# Patient Record
Sex: MALE | Race: WHITE | Employment: OTHER | URBAN - NONMETROPOLITAN AREA
[De-identification: names, ages, dates, MRNs, and addresses within clinical notes are randomized per-mention and may not be internally consistent; named-entity substitution may affect disease eponyms.]

---

## 2017-02-09 ENCOUNTER — OFFICE VISIT (OUTPATIENT)
Dept: INTERNAL MEDICINE CLINIC | Age: 72
End: 2017-02-09

## 2017-02-09 VITALS
WEIGHT: 164.2 LBS | RESPIRATION RATE: 16 BRPM | BODY MASS INDEX: 24.25 KG/M2 | HEART RATE: 72 BPM | DIASTOLIC BLOOD PRESSURE: 82 MMHG | TEMPERATURE: 97.7 F | SYSTOLIC BLOOD PRESSURE: 158 MMHG | OXYGEN SATURATION: 98 %

## 2017-02-09 DIAGNOSIS — E78.00 PURE HYPERCHOLESTEROLEMIA: ICD-10-CM

## 2017-02-09 DIAGNOSIS — I10 ESSENTIAL HYPERTENSION: Primary | ICD-10-CM

## 2017-02-09 DIAGNOSIS — Z12.5 SCREENING FOR PROSTATE CANCER: ICD-10-CM

## 2017-02-09 DIAGNOSIS — E03.9 ACQUIRED HYPOTHYROIDISM: ICD-10-CM

## 2017-02-09 PROCEDURE — 3288F FALL RISK ASSESSMENT DOCD: CPT | Performed by: INTERNAL MEDICINE

## 2017-02-09 PROCEDURE — G8510 SCR DEP NEG, NO PLAN REQD: HCPCS | Performed by: INTERNAL MEDICINE

## 2017-02-09 PROCEDURE — 99213 OFFICE O/P EST LOW 20 MIN: CPT | Performed by: INTERNAL MEDICINE

## 2017-02-09 RX ORDER — LEVOTHYROXINE SODIUM 0.05 MG/1
50 TABLET ORAL DAILY
Qty: 90 TABLET | Refills: 1 | Status: SHIPPED | OUTPATIENT
Start: 2017-02-09 | End: 2017-08-09 | Stop reason: SDUPTHER

## 2017-02-09 RX ORDER — AMLODIPINE BESYLATE 5 MG/1
5 TABLET ORAL DAILY
Qty: 30 TABLET | Refills: 3 | Status: SHIPPED | OUTPATIENT
Start: 2017-02-09 | End: 2017-05-25 | Stop reason: SDUPTHER

## 2017-02-09 ASSESSMENT — PATIENT HEALTH QUESTIONNAIRE - PHQ9
SUM OF ALL RESPONSES TO PHQ QUESTIONS 1-9: 0
2. FEELING DOWN, DEPRESSED OR HOPELESS: 0
1. LITTLE INTEREST OR PLEASURE IN DOING THINGS: 0
SUM OF ALL RESPONSES TO PHQ9 QUESTIONS 1 & 2: 0

## 2017-03-15 ENCOUNTER — OFFICE VISIT (OUTPATIENT)
Dept: INTERNAL MEDICINE CLINIC | Age: 72
End: 2017-03-15

## 2017-03-15 VITALS
HEIGHT: 69 IN | OXYGEN SATURATION: 98 % | RESPIRATION RATE: 16 BRPM | SYSTOLIC BLOOD PRESSURE: 148 MMHG | HEART RATE: 72 BPM | WEIGHT: 161.4 LBS | BODY MASS INDEX: 23.91 KG/M2 | DIASTOLIC BLOOD PRESSURE: 80 MMHG | TEMPERATURE: 97.9 F

## 2017-03-15 DIAGNOSIS — J20.9 ACUTE BRONCHITIS, UNSPECIFIED ORGANISM: Primary | ICD-10-CM

## 2017-03-15 PROCEDURE — 99213 OFFICE O/P EST LOW 20 MIN: CPT | Performed by: INTERNAL MEDICINE

## 2017-03-15 RX ORDER — AZITHROMYCIN 250 MG/1
TABLET, FILM COATED ORAL
Qty: 1 PACKET | Refills: 0 | Status: SHIPPED | OUTPATIENT
Start: 2017-03-15 | End: 2017-05-09 | Stop reason: ALTCHOICE

## 2017-03-30 ENCOUNTER — TELEPHONE (OUTPATIENT)
Dept: INTERNAL MEDICINE CLINIC | Age: 72
End: 2017-03-30

## 2017-04-28 ENCOUNTER — TELEPHONE (OUTPATIENT)
Dept: INTERNAL MEDICINE CLINIC | Age: 72
End: 2017-04-28

## 2017-05-04 ENCOUNTER — HOSPITAL ENCOUNTER (OUTPATIENT)
Dept: LAB | Age: 72
Discharge: HOME | End: 2017-05-04
Admitting: INTERNAL MEDICINE

## 2017-05-04 LAB
ALBUMIN SERPL-MCNC: 4.4 GM/DL (ref 3.4–5)
ALP BLD-CCNC: 96 IU/L (ref 40–129)
ALT SERPL-CCNC: 30 U/L (ref 10–40)
ANION GAP SERPL CALCULATED.3IONS-SCNC: 8 MMOL/L (ref 4–16)
AST SERPL-CCNC: 34 IU/L (ref 15–37)
BILIRUB SERPL-MCNC: 0.4 MG/DL (ref 0–1)
BUN BLDV-MCNC: 17 MG/DL (ref 6–23)
CALCIUM SERPL-MCNC: 9.7 MG/DL (ref 8.3–10.6)
CHLORIDE BLD-SCNC: 103 MMOL/L (ref 99–110)
CHOLESTEROL, FASTING: 142 MG/DL
CO2: 29 MMOL/L (ref 21–32)
CREAT SERPL-MCNC: 1 MG/DL (ref 0.9–1.3)
GFR AFRICAN AMERICAN: >60 ML/MIN/1.73M2
GFR NON-AFRICAN AMERICAN: >60 ML/MIN/1.73M2
GLUCOSE FASTING: 101 MG/DL (ref 70–99)
HDLC SERPL-MCNC: 70 MG/DL
LDL CHOLESTEROL DIRECT: 70 MG/DL
POTASSIUM SERPL-SCNC: 4.2 MMOL/L (ref 3.5–5.1)
PROSTATE SPECIFIC ANTIGEN: 2.44 NG/ML (ref 0–4)
SODIUM BLD-SCNC: 140 MMOL/L (ref 135–145)
TOTAL PROTEIN: 7.3 GM/DL (ref 6.4–8.2)
TRIGLYCERIDE, FASTING: 98 MG/DL
TSH HIGH SENSITIVITY: 3.13 UIU/ML (ref 0.27–4.2)

## 2017-05-09 ENCOUNTER — OFFICE VISIT (OUTPATIENT)
Dept: INTERNAL MEDICINE CLINIC | Age: 72
End: 2017-05-09

## 2017-05-09 VITALS
WEIGHT: 162.8 LBS | BODY MASS INDEX: 24.11 KG/M2 | HEART RATE: 68 BPM | RESPIRATION RATE: 13 BRPM | SYSTOLIC BLOOD PRESSURE: 162 MMHG | TEMPERATURE: 98 F | DIASTOLIC BLOOD PRESSURE: 82 MMHG | HEIGHT: 69 IN | OXYGEN SATURATION: 98 %

## 2017-05-09 DIAGNOSIS — E78.00 PURE HYPERCHOLESTEROLEMIA: Primary | ICD-10-CM

## 2017-05-09 DIAGNOSIS — E03.9 ACQUIRED HYPOTHYROIDISM: ICD-10-CM

## 2017-05-09 DIAGNOSIS — I10 ESSENTIAL HYPERTENSION: ICD-10-CM

## 2017-05-09 PROCEDURE — 99213 OFFICE O/P EST LOW 20 MIN: CPT | Performed by: INTERNAL MEDICINE

## 2017-05-25 RX ORDER — AMLODIPINE BESYLATE 5 MG/1
5 TABLET ORAL DAILY
Qty: 30 TABLET | Refills: 5 | Status: SHIPPED | OUTPATIENT
Start: 2017-05-25 | End: 2017-11-09 | Stop reason: SDUPTHER

## 2017-06-19 RX ORDER — ATORVASTATIN CALCIUM 40 MG/1
TABLET, FILM COATED ORAL
Qty: 90 TABLET | Refills: 1 | Status: SHIPPED | OUTPATIENT
Start: 2017-06-19 | End: 2017-11-09 | Stop reason: SDUPTHER

## 2017-08-09 ENCOUNTER — OFFICE VISIT (OUTPATIENT)
Dept: INTERNAL MEDICINE CLINIC | Age: 72
End: 2017-08-09

## 2017-08-09 VITALS
OXYGEN SATURATION: 98 % | BODY MASS INDEX: 24.25 KG/M2 | RESPIRATION RATE: 16 BRPM | TEMPERATURE: 98.2 F | SYSTOLIC BLOOD PRESSURE: 128 MMHG | HEART RATE: 76 BPM | DIASTOLIC BLOOD PRESSURE: 72 MMHG | WEIGHT: 164.2 LBS

## 2017-08-09 DIAGNOSIS — E03.9 ACQUIRED HYPOTHYROIDISM: ICD-10-CM

## 2017-08-09 DIAGNOSIS — I10 ESSENTIAL HYPERTENSION: Primary | ICD-10-CM

## 2017-08-09 DIAGNOSIS — E78.00 PURE HYPERCHOLESTEROLEMIA: ICD-10-CM

## 2017-08-09 PROCEDURE — 99213 OFFICE O/P EST LOW 20 MIN: CPT | Performed by: INTERNAL MEDICINE

## 2017-08-09 RX ORDER — LEVOTHYROXINE SODIUM 0.05 MG/1
50 TABLET ORAL DAILY
Qty: 90 TABLET | Refills: 1 | Status: SHIPPED | OUTPATIENT
Start: 2017-08-09 | End: 2018-02-08 | Stop reason: SDUPTHER

## 2017-08-09 ASSESSMENT — ENCOUNTER SYMPTOMS
EYES NEGATIVE: 1
SHORTNESS OF BREATH: 0
NAUSEA: 0
ORTHOPNEA: 0
GASTROINTESTINAL NEGATIVE: 1
HEARTBURN: 0
RESPIRATORY NEGATIVE: 1
VOMITING: 0
COUGH: 0

## 2017-11-09 ENCOUNTER — OFFICE VISIT (OUTPATIENT)
Dept: INTERNAL MEDICINE CLINIC | Age: 72
End: 2017-11-09

## 2017-11-09 VITALS
BODY MASS INDEX: 24.78 KG/M2 | RESPIRATION RATE: 20 BRPM | SYSTOLIC BLOOD PRESSURE: 132 MMHG | DIASTOLIC BLOOD PRESSURE: 72 MMHG | HEART RATE: 72 BPM | TEMPERATURE: 98 F | OXYGEN SATURATION: 98 % | WEIGHT: 167.8 LBS

## 2017-11-09 DIAGNOSIS — N40.1 BENIGN PROSTATIC HYPERPLASIA WITH URINARY FREQUENCY: ICD-10-CM

## 2017-11-09 DIAGNOSIS — Z12.5 SCREENING FOR PROSTATE CANCER: ICD-10-CM

## 2017-11-09 DIAGNOSIS — I10 ESSENTIAL HYPERTENSION: Primary | ICD-10-CM

## 2017-11-09 DIAGNOSIS — E78.00 PURE HYPERCHOLESTEROLEMIA: ICD-10-CM

## 2017-11-09 DIAGNOSIS — E03.9 ACQUIRED HYPOTHYROIDISM: ICD-10-CM

## 2017-11-09 DIAGNOSIS — Z23 NEED FOR PROPHYLACTIC VACCINATION AGAINST STREPTOCOCCUS PNEUMONIAE (PNEUMOCOCCUS): ICD-10-CM

## 2017-11-09 DIAGNOSIS — R35.0 BENIGN PROSTATIC HYPERPLASIA WITH URINARY FREQUENCY: ICD-10-CM

## 2017-11-09 PROCEDURE — G0009 ADMIN PNEUMOCOCCAL VACCINE: HCPCS | Performed by: INTERNAL MEDICINE

## 2017-11-09 PROCEDURE — 99213 OFFICE O/P EST LOW 20 MIN: CPT | Performed by: INTERNAL MEDICINE

## 2017-11-09 PROCEDURE — 90732 PPSV23 VACC 2 YRS+ SUBQ/IM: CPT | Performed by: INTERNAL MEDICINE

## 2017-11-09 RX ORDER — AMLODIPINE BESYLATE 5 MG/1
5 TABLET ORAL DAILY
Qty: 90 TABLET | Refills: 1 | Status: SHIPPED | OUTPATIENT
Start: 2017-11-09 | End: 2018-02-08 | Stop reason: SDUPTHER

## 2017-11-09 RX ORDER — ATORVASTATIN CALCIUM 40 MG/1
TABLET, FILM COATED ORAL
Qty: 90 TABLET | Refills: 1 | Status: SHIPPED | OUTPATIENT
Start: 2017-11-09 | End: 2018-02-08 | Stop reason: SDUPTHER

## 2017-11-09 RX ORDER — INFLUENZA A VIRUS A/MICHIGAN/45/2015 X-275 (H1N1) ANTIGEN (FORMALDEHYDE INACTIVATED), INFLUENZA A VIRUS A/SINGAPORE/INFIMH-16-0019/2016 IVR-186 (H3N2) ANTIGEN (FORMALDEHYDE INACTIVATED), AND INFLUENZA B VIRUS B/MARYLAND/15/2016 BX-69A (A B/COLORADO/6/2017-LIKE VIRUS) ANTIGEN (FORMALDEHYDE INACTIVATED) 60; 60; 60 UG/.5ML; UG/.5ML; UG/.5ML
INJECTION, SUSPENSION INTRAMUSCULAR
COMMUNITY
Start: 2017-09-23 | End: 2018-02-08 | Stop reason: ALTCHOICE

## 2017-11-09 ASSESSMENT — ENCOUNTER SYMPTOMS
EYES NEGATIVE: 1
GASTROINTESTINAL NEGATIVE: 1
RESPIRATORY NEGATIVE: 1

## 2017-11-09 NOTE — PROGRESS NOTES
Jimenez Allen  Patient's  is 1945  Seen in office on 2017      SUBJECTIVE:  Rosalina Aguero is a 71 y. o.year old male presents today   Chief Complaint   Patient presents with    Hypertension     3 month follow up    Hyperlipidemia    Medication Refill     patient requestion written prescriptions please. Pt is here for HTN HLD   Pt is doing well. Has no complaints  Patient has hypertension. Taking medications No headaches, no chest pain, no palpitations and no dizziness. Patient has hyperlipidemia. Taking medications. No abdominal pain, no nausea or vomiting. No myalgias. Has mild fatigue all the time  Taking medications regularly. No side effects noted. Review of Systems   Constitutional: Positive for malaise/fatigue. Eyes: Negative. Respiratory: Negative. Cardiovascular: Negative. Negative for chest pain and palpitations. Gastrointestinal: Negative. Genitourinary: Positive for frequency. Musculoskeletal: Negative. Skin: Negative. Neurological: Negative. Endo/Heme/Allergies: Negative. Psychiatric/Behavioral: Negative. OBJECTIVE: /72   Pulse 72   Temp 98 °F (36.7 °C) (Oral)   Resp 20   Wt 167 lb 12.8 oz (76.1 kg)   SpO2 98%   BMI 24.78 kg/m²     Wt Readings from Last 3 Encounters:   17 167 lb 12.8 oz (76.1 kg)   17 164 lb 3.2 oz (74.5 kg)   17 162 lb 12.8 oz (73.8 kg)      GENERAL:  Alert, oriented, pleasant, in no apparent distress. HEENT:  Conjunctiva pink, no scleral icterus. ENT clear. NECK:  Supple. No jugular venous distention noted. No masses felt,  CARDIOVASCULAR:  Normal S1 and S2    PULMONARY:  No respiratory distress. No wheezes or rales. ABDOMEN:  Soft and non-tender,no masses  or organomegaly. EXTREMITIES:  No cyanosis, clubbing, or significant edema. SKIN: Skin is warm and dry. NEUROLOGICAL:  Cranial nerves II through XII are grossly intact. IMPRESSION:    Encounter Diagnoses   Name Primary? Medical History:   Diagnosis Date    Benign prostatic hyperplasia with urinary frequency 11/9/2017    Patient is taking Saw Palmetto combinations    H/O colonoscopy 3/2012    Dr. Clementine Vicente (hyperlipidemia)     Hypothyroidism      Past Surgical History:   Procedure Laterality Date    APPENDECTOMY  age 24    COLONOSCOPY  03/28/2012     DR. Chan     Social History   Substance Use Topics    Smoking status: Former Smoker     Quit date: 11/24/1988    Smokeless tobacco: Never Used    Alcohol use No       LAB REVIEW:  CBC: No results found for: WBC, HGB, HCT, PLT  Lipids:   Lab Results   Component Value Date    CHOL 160 10/29/2016    TRIG 102 10/29/2016    HDL 70 05/04/2017    LDLDIRECT 70 05/04/2017    TRIGLYCFAST 98 05/04/2017    CHOLESTFAST 142 05/04/2017     Renal:   Lab Results   Component Value Date    BUN 17 05/04/2017    CREATININE 1.0 05/04/2017     05/04/2017    K 4.2 05/04/2017    ALT 30 05/04/2017    AST 34 05/04/2017     PT/INR: No results found for: INR  A1C: No results found for: Hiral Phan MD, 11/9/2017 , 3:40 PM

## 2018-02-03 ENCOUNTER — HOSPITAL ENCOUNTER (OUTPATIENT)
Dept: LAB | Age: 73
Discharge: HOME | End: 2018-02-03
Attending: INTERNAL MEDICINE | Admitting: INTERNAL MEDICINE

## 2018-02-03 LAB
ALBUMIN SERPL-MCNC: 4.5 GM/DL (ref 3.4–5)
ALP BLD-CCNC: 100 IU/L (ref 40–129)
ALT SERPL-CCNC: 24 U/L (ref 10–40)
ANION GAP SERPL CALCULATED.3IONS-SCNC: 12 MMOL/L (ref 4–16)
AST SERPL-CCNC: 26 IU/L (ref 15–37)
BILIRUB SERPL-MCNC: 0.6 MG/DL (ref 0–1)
BUN BLDV-MCNC: 17 MG/DL (ref 6–23)
CALCIUM SERPL-MCNC: 9.7 MG/DL (ref 8.3–10.6)
CHLORIDE BLD-SCNC: 103 MMOL/L (ref 99–110)
CHOLESTEROL, FASTING: 137 MG/DL
CO2: 29 MMOL/L (ref 21–32)
CREAT SERPL-MCNC: 1.2 MG/DL (ref 0.9–1.3)
GFR AFRICAN AMERICAN: >60 ML/MIN/1.73M2
GFR NON-AFRICAN AMERICAN: 60 ML/MIN/1.73M2
GLUCOSE FASTING: 93 MG/DL (ref 70–99)
HDLC SERPL-MCNC: 67 MG/DL
LDL CHOLESTEROL DIRECT: 64 MG/DL
POTASSIUM SERPL-SCNC: 4.7 MMOL/L (ref 3.5–5.1)
PROSTATE SPECIFIC ANTIGEN: 2.29 NG/ML (ref 0–4)
SODIUM BLD-SCNC: 144 MMOL/L (ref 135–145)
TOTAL PROTEIN: 7.4 GM/DL (ref 6.4–8.2)
TRIGLYCERIDE, FASTING: 95 MG/DL

## 2018-02-08 ENCOUNTER — OFFICE VISIT (OUTPATIENT)
Dept: INTERNAL MEDICINE CLINIC | Age: 73
End: 2018-02-08

## 2018-02-08 VITALS
RESPIRATION RATE: 16 BRPM | SYSTOLIC BLOOD PRESSURE: 138 MMHG | HEIGHT: 69 IN | DIASTOLIC BLOOD PRESSURE: 82 MMHG | HEART RATE: 86 BPM | WEIGHT: 163.6 LBS | OXYGEN SATURATION: 98 % | TEMPERATURE: 97.8 F | BODY MASS INDEX: 24.23 KG/M2

## 2018-02-08 DIAGNOSIS — I10 ESSENTIAL HYPERTENSION: Primary | ICD-10-CM

## 2018-02-08 DIAGNOSIS — Z72.89 OTHER PROBLEMS RELATED TO LIFESTYLE: ICD-10-CM

## 2018-02-08 DIAGNOSIS — N40.1 BENIGN PROSTATIC HYPERPLASIA WITH URINARY FREQUENCY: ICD-10-CM

## 2018-02-08 DIAGNOSIS — R35.0 BENIGN PROSTATIC HYPERPLASIA WITH URINARY FREQUENCY: ICD-10-CM

## 2018-02-08 DIAGNOSIS — E03.9 ACQUIRED HYPOTHYROIDISM: ICD-10-CM

## 2018-02-08 DIAGNOSIS — E78.00 PURE HYPERCHOLESTEROLEMIA: ICD-10-CM

## 2018-02-08 PROCEDURE — 99213 OFFICE O/P EST LOW 20 MIN: CPT | Performed by: INTERNAL MEDICINE

## 2018-02-08 RX ORDER — AMLODIPINE BESYLATE 5 MG/1
5 TABLET ORAL DAILY
Qty: 90 TABLET | Refills: 1 | Status: SHIPPED | OUTPATIENT
Start: 2018-02-08 | End: 2018-08-06 | Stop reason: SDUPTHER

## 2018-02-08 RX ORDER — LEVOTHYROXINE SODIUM 0.05 MG/1
50 TABLET ORAL DAILY
Qty: 90 TABLET | Refills: 1 | Status: SHIPPED | OUTPATIENT
Start: 2018-02-08 | End: 2018-08-06 | Stop reason: SDUPTHER

## 2018-02-08 RX ORDER — ATORVASTATIN CALCIUM 40 MG/1
TABLET, FILM COATED ORAL
Qty: 90 TABLET | Refills: 1 | Status: SHIPPED | OUTPATIENT
Start: 2018-02-08 | End: 2018-08-06 | Stop reason: SDUPTHER

## 2018-02-08 ASSESSMENT — ENCOUNTER SYMPTOMS
RESPIRATORY NEGATIVE: 1
GASTROINTESTINAL NEGATIVE: 1
EYES NEGATIVE: 1

## 2018-04-10 ENCOUNTER — OFFICE VISIT (OUTPATIENT)
Dept: INTERNAL MEDICINE CLINIC | Age: 73
End: 2018-04-10

## 2018-04-10 VITALS
BODY MASS INDEX: 24.43 KG/M2 | HEART RATE: 72 BPM | RESPIRATION RATE: 16 BRPM | SYSTOLIC BLOOD PRESSURE: 138 MMHG | WEIGHT: 165.4 LBS | TEMPERATURE: 98 F | DIASTOLIC BLOOD PRESSURE: 80 MMHG | OXYGEN SATURATION: 98 %

## 2018-04-10 DIAGNOSIS — I10 ESSENTIAL HYPERTENSION: ICD-10-CM

## 2018-04-10 DIAGNOSIS — N40.1 BENIGN PROSTATIC HYPERPLASIA WITH URINARY FREQUENCY: ICD-10-CM

## 2018-04-10 DIAGNOSIS — E03.9 ACQUIRED HYPOTHYROIDISM: ICD-10-CM

## 2018-04-10 DIAGNOSIS — R35.0 BENIGN PROSTATIC HYPERPLASIA WITH URINARY FREQUENCY: ICD-10-CM

## 2018-04-10 DIAGNOSIS — Z00.00 ROUTINE GENERAL MEDICAL EXAMINATION AT A HEALTH CARE FACILITY: Primary | ICD-10-CM

## 2018-04-10 DIAGNOSIS — E78.00 PURE HYPERCHOLESTEROLEMIA: ICD-10-CM

## 2018-04-10 PROCEDURE — G0439 PPPS, SUBSEQ VISIT: HCPCS | Performed by: INTERNAL MEDICINE

## 2018-04-10 ASSESSMENT — LIFESTYLE VARIABLES: HOW OFTEN DO YOU HAVE A DRINK CONTAINING ALCOHOL: 0

## 2018-04-10 ASSESSMENT — ANXIETY QUESTIONNAIRES: GAD7 TOTAL SCORE: 1

## 2018-04-10 ASSESSMENT — PATIENT HEALTH QUESTIONNAIRE - PHQ9: SUM OF ALL RESPONSES TO PHQ QUESTIONS 1-9: 0

## 2018-04-12 DIAGNOSIS — Z13.6 ENCOUNTER FOR ABDOMINAL AORTIC ANEURYSM (AAA) SCREENING: Primary | ICD-10-CM

## 2018-04-23 ENCOUNTER — HOSPITAL ENCOUNTER (OUTPATIENT)
Dept: ULTRASOUND IMAGING | Age: 73
Discharge: HOME | End: 2018-04-23
Attending: INTERNAL MEDICINE | Admitting: INTERNAL MEDICINE

## 2018-04-23 DIAGNOSIS — Z13.6 ENCOUNTER FOR SCREENING FOR CARDIOVASCULAR DISORDERS: ICD-10-CM

## 2018-04-23 DIAGNOSIS — Z13.6 SCREENING FOR AAA (ABDOMINAL AORTIC ANEURYSM): ICD-10-CM

## 2018-04-26 ENCOUNTER — TELEPHONE (OUTPATIENT)
Dept: INTERNAL MEDICINE CLINIC | Age: 73
End: 2018-04-26

## 2018-05-05 ENCOUNTER — HOSPITAL ENCOUNTER (OUTPATIENT)
Dept: LAB | Age: 73
Discharge: HOME | End: 2018-05-05
Attending: INTERNAL MEDICINE | Admitting: INTERNAL MEDICINE

## 2018-05-05 LAB
HEPATITIS C ANTIBODY: NON REACTIVE
TSH HIGH SENSITIVITY: 3.04 UIU/ML (ref 0.27–4.2)

## 2018-05-10 ENCOUNTER — OFFICE VISIT (OUTPATIENT)
Dept: INTERNAL MEDICINE CLINIC | Age: 73
End: 2018-05-10

## 2018-05-10 VITALS
WEIGHT: 161 LBS | SYSTOLIC BLOOD PRESSURE: 132 MMHG | DIASTOLIC BLOOD PRESSURE: 80 MMHG | HEART RATE: 74 BPM | OXYGEN SATURATION: 96 % | BODY MASS INDEX: 23.78 KG/M2 | RESPIRATION RATE: 12 BRPM | TEMPERATURE: 98.3 F

## 2018-05-10 DIAGNOSIS — R35.0 BENIGN PROSTATIC HYPERPLASIA WITH URINARY FREQUENCY: ICD-10-CM

## 2018-05-10 DIAGNOSIS — I10 ESSENTIAL HYPERTENSION: Primary | ICD-10-CM

## 2018-05-10 DIAGNOSIS — E78.00 PURE HYPERCHOLESTEROLEMIA: ICD-10-CM

## 2018-05-10 DIAGNOSIS — E03.9 ACQUIRED HYPOTHYROIDISM: ICD-10-CM

## 2018-05-10 DIAGNOSIS — N40.1 BENIGN PROSTATIC HYPERPLASIA WITH URINARY FREQUENCY: ICD-10-CM

## 2018-05-10 PROCEDURE — 99213 OFFICE O/P EST LOW 20 MIN: CPT | Performed by: INTERNAL MEDICINE

## 2018-05-10 ASSESSMENT — ENCOUNTER SYMPTOMS
VOMITING: 0
EYES NEGATIVE: 1
ABDOMINAL PAIN: 0
SHORTNESS OF BREATH: 0
HEARTBURN: 0
NAUSEA: 0
GASTROINTESTINAL NEGATIVE: 1
COUGH: 0
RESPIRATORY NEGATIVE: 1

## 2018-07-02 ENCOUNTER — OFFICE VISIT (OUTPATIENT)
Dept: INTERNAL MEDICINE CLINIC | Age: 73
End: 2018-07-02

## 2018-07-02 VITALS
OXYGEN SATURATION: 98 % | SYSTOLIC BLOOD PRESSURE: 142 MMHG | HEART RATE: 68 BPM | WEIGHT: 164 LBS | BODY MASS INDEX: 24.22 KG/M2 | TEMPERATURE: 98 F | DIASTOLIC BLOOD PRESSURE: 80 MMHG

## 2018-07-02 DIAGNOSIS — I10 ESSENTIAL HYPERTENSION: ICD-10-CM

## 2018-07-02 DIAGNOSIS — J20.9 ACUTE BRONCHITIS, UNSPECIFIED ORGANISM: Primary | ICD-10-CM

## 2018-07-02 PROCEDURE — 99213 OFFICE O/P EST LOW 20 MIN: CPT | Performed by: INTERNAL MEDICINE

## 2018-07-02 RX ORDER — AZITHROMYCIN 250 MG/1
TABLET, FILM COATED ORAL
Qty: 1 PACKET | Refills: 0 | Status: SHIPPED | OUTPATIENT
Start: 2018-07-02 | End: 2018-08-06 | Stop reason: ALTCHOICE

## 2018-07-02 ASSESSMENT — ENCOUNTER SYMPTOMS
WHEEZING: 0
SINUS PAIN: 0
EYES NEGATIVE: 1
SPUTUM PRODUCTION: 0
SORE THROAT: 0
HEMOPTYSIS: 0
COUGH: 1
SHORTNESS OF BREATH: 0

## 2018-07-28 ENCOUNTER — HOSPITAL ENCOUNTER (OUTPATIENT)
Dept: LAB | Age: 73
Discharge: HOME | End: 2018-07-28
Attending: INTERNAL MEDICINE | Admitting: INTERNAL MEDICINE

## 2018-07-28 LAB
ALBUMIN SERPL-MCNC: 4.5 GM/DL (ref 3.4–5)
ALP BLD-CCNC: 101 IU/L (ref 40–129)
ALT SERPL-CCNC: 31 U/L (ref 10–40)
ANION GAP SERPL CALCULATED.3IONS-SCNC: 17 MMOL/L (ref 4–16)
AST SERPL-CCNC: 31 IU/L (ref 15–37)
BILIRUB SERPL-MCNC: 0.5 MG/DL (ref 0–1)
BUN BLDV-MCNC: 15 MG/DL (ref 6–23)
CALCIUM SERPL-MCNC: 9.8 MG/DL (ref 8.3–10.6)
CHLORIDE BLD-SCNC: 102 MMOL/L (ref 99–110)
CHOLESTEROL, FASTING: 133 MG/DL
CO2: 25 MMOL/L (ref 21–32)
CREAT SERPL-MCNC: 1.1 MG/DL (ref 0.9–1.3)
GFR AFRICAN AMERICAN: >60 ML/MIN/1.73M2
GFR NON-AFRICAN AMERICAN: >60 ML/MIN/1.73M2
GLUCOSE FASTING: 96 MG/DL (ref 70–99)
HDLC SERPL-MCNC: 67 MG/DL
LDL CHOLESTEROL DIRECT: 65 MG/DL
POTASSIUM SERPL-SCNC: 4.2 MMOL/L (ref 3.5–5.1)
SODIUM BLD-SCNC: 144 MMOL/L (ref 135–145)
TOTAL PROTEIN: 7.4 GM/DL (ref 6.4–8.2)
TRIGLYCERIDE, FASTING: 79 MG/DL

## 2018-08-06 ENCOUNTER — OFFICE VISIT (OUTPATIENT)
Dept: INTERNAL MEDICINE CLINIC | Age: 73
End: 2018-08-06

## 2018-08-06 VITALS
DIASTOLIC BLOOD PRESSURE: 78 MMHG | RESPIRATION RATE: 16 BRPM | WEIGHT: 168.8 LBS | OXYGEN SATURATION: 96 % | TEMPERATURE: 98 F | HEART RATE: 84 BPM | BODY MASS INDEX: 24.93 KG/M2 | SYSTOLIC BLOOD PRESSURE: 138 MMHG

## 2018-08-06 DIAGNOSIS — E78.00 PURE HYPERCHOLESTEROLEMIA: ICD-10-CM

## 2018-08-06 DIAGNOSIS — F51.01 PRIMARY INSOMNIA: ICD-10-CM

## 2018-08-06 DIAGNOSIS — I10 ESSENTIAL HYPERTENSION: ICD-10-CM

## 2018-08-06 DIAGNOSIS — N40.1 BENIGN PROSTATIC HYPERPLASIA WITH URINARY FREQUENCY: ICD-10-CM

## 2018-08-06 DIAGNOSIS — E03.9 ACQUIRED HYPOTHYROIDISM: ICD-10-CM

## 2018-08-06 DIAGNOSIS — R35.0 BENIGN PROSTATIC HYPERPLASIA WITH URINARY FREQUENCY: ICD-10-CM

## 2018-08-06 PROCEDURE — 99213 OFFICE O/P EST LOW 20 MIN: CPT | Performed by: INTERNAL MEDICINE

## 2018-08-06 RX ORDER — AMLODIPINE BESYLATE 5 MG/1
5 TABLET ORAL DAILY
Qty: 90 TABLET | Refills: 1 | Status: SHIPPED | OUTPATIENT
Start: 2018-08-06 | End: 2019-04-10 | Stop reason: SDUPTHER

## 2018-08-06 RX ORDER — LEVOTHYROXINE SODIUM 0.05 MG/1
50 TABLET ORAL DAILY
Qty: 90 TABLET | Refills: 1 | Status: SHIPPED | OUTPATIENT
Start: 2018-08-06 | End: 2019-04-10 | Stop reason: SDUPTHER

## 2018-08-06 RX ORDER — ATORVASTATIN CALCIUM 40 MG/1
TABLET, FILM COATED ORAL
Qty: 90 TABLET | Refills: 1 | Status: SHIPPED | OUTPATIENT
Start: 2018-08-06 | End: 2019-04-10 | Stop reason: SDUPTHER

## 2018-08-06 ASSESSMENT — ENCOUNTER SYMPTOMS
EYES NEGATIVE: 1
RESPIRATORY NEGATIVE: 1
HEMOPTYSIS: 0
GASTROINTESTINAL NEGATIVE: 1
COUGH: 0

## 2018-08-06 NOTE — PROGRESS NOTES
PO) Take 2 tablets by mouth daily      Omega 3 1200 MG CAPS Take 2,400 mg by mouth daily       MULTIPLE VITAMIN PO Take 1 tablet by mouth daily. No current facility-administered medications for this visit. Past Medical History:   Diagnosis Date    Benign prostatic hyperplasia with urinary frequency 11/9/2017    Patient is taking Saw Palmetto combinations    H/O colonoscopy 3/2012    Dr. Adolfo Tom (hyperlipidemia)     Hypothyroidism      Past Surgical History:   Procedure Laterality Date    APPENDECTOMY  age 24    COLONOSCOPY  03/28/2012     DR. Chan     Social History   Substance Use Topics    Smoking status: Former Smoker     Packs/day: 1.00     Years: 35.00     Types: Cigarettes     Quit date: 11/24/1988    Smokeless tobacco: Never Used    Alcohol use No       LAB REVIEW:  CBC: No results found for: WBC, HGB, HCT, PLT  Lipids:   Lab Results   Component Value Date    CHOL 160 10/29/2016    TRIG 102 10/29/2016    HDL 67 07/28/2018    LDLDIRECT 65 07/28/2018    TRIGLYCFAST 79 07/28/2018     Renal:   Lab Results   Component Value Date    BUN 15 07/28/2018    CREATININE 1.1 07/28/2018     07/28/2018    K 4.2 07/28/2018    ALT 31 07/28/2018    AST 31 07/28/2018     PT/INR: No results found for: INR  A1C: No results found for: Joshua Bhat MD, 8/6/2018 , 4:23 PM

## 2018-08-08 ENCOUNTER — TELEPHONE (OUTPATIENT)
Dept: INTERNAL MEDICINE CLINIC | Age: 73
End: 2018-08-08

## 2018-08-08 NOTE — TELEPHONE ENCOUNTER
Patient phoned stating that he took two claritin since Monday and feels \"funny\". States that he felt his heart rate increase,light headedness,HA. Denies chest pain, n/v. Instructed to stop the medication which he said that he is not going to take any more and offered evaluation at [facility]. Patient state that he will just rest and will seek help if symptoms get worse.

## 2018-10-08 ENCOUNTER — OFFICE VISIT (OUTPATIENT)
Dept: INTERNAL MEDICINE CLINIC | Age: 73
End: 2018-10-08
Payer: COMMERCIAL

## 2018-10-08 VITALS
TEMPERATURE: 98.1 F | HEART RATE: 62 BPM | BODY MASS INDEX: 25.25 KG/M2 | RESPIRATION RATE: 20 BRPM | SYSTOLIC BLOOD PRESSURE: 134 MMHG | WEIGHT: 171 LBS | DIASTOLIC BLOOD PRESSURE: 80 MMHG | OXYGEN SATURATION: 98 %

## 2018-10-08 DIAGNOSIS — N39.0 URINARY TRACT INFECTION WITHOUT HEMATURIA, SITE UNSPECIFIED: ICD-10-CM

## 2018-10-08 DIAGNOSIS — R30.0 DYSURIA: Primary | ICD-10-CM

## 2018-10-08 LAB
BILIRUBIN, POC: NEGATIVE
BLOOD URINE, POC: NEGATIVE
CLARITY, POC: CLEAR
COLOR, POC: YELLOW
GLUCOSE URINE, POC: NEGATIVE
KETONES, POC: NEGATIVE
LEUKOCYTE EST, POC: NEGATIVE
NITRITE, POC: NEGATIVE
PH, POC: 7
PROTEIN, POC: NEGATIVE
SPECIFIC GRAVITY, POC: 1.01
UROBILINOGEN, POC: 0.2

## 2018-10-08 PROCEDURE — 99213 OFFICE O/P EST LOW 20 MIN: CPT | Performed by: INTERNAL MEDICINE

## 2018-10-08 PROCEDURE — 81002 URINALYSIS NONAUTO W/O SCOPE: CPT | Performed by: INTERNAL MEDICINE

## 2018-10-08 RX ORDER — INFLUENZA A VIRUS A/MICHIGAN/45/2015 X-275 (H1N1) ANTIGEN (FORMALDEHYDE INACTIVATED), INFLUENZA A VIRUS A/SINGAPORE/INFIMH-16-0019/2016 IVR-186 (H3N2) ANTIGEN (FORMALDEHYDE INACTIVATED), AND INFLUENZA B VIRUS B/MARYLAND/15/2016 BX-69A (A B/COLORADO/6/2017-LIKE VIRUS) ANTIGEN (FORMALDEHYDE INACTIVATED) 60; 60; 60 UG/.5ML; UG/.5ML; UG/.5ML
INJECTION, SUSPENSION INTRAMUSCULAR
COMMUNITY
Start: 2018-09-22 | End: 2019-02-06 | Stop reason: ALTCHOICE

## 2018-10-08 RX ORDER — CIPROFLOXACIN 250 MG/1
250 TABLET, FILM COATED ORAL 2 TIMES DAILY
Qty: 14 TABLET | Refills: 0 | Status: SHIPPED | OUTPATIENT
Start: 2018-10-08 | End: 2018-10-15 | Stop reason: ALTCHOICE

## 2018-10-11 ENCOUNTER — HOSPITAL ENCOUNTER (OUTPATIENT)
Dept: ULTRASOUND IMAGING | Age: 73
Discharge: HOME | End: 2018-10-11
Payer: COMMERCIAL

## 2018-10-11 DIAGNOSIS — R30.0 DYSURIA: ICD-10-CM

## 2018-10-11 PROCEDURE — 76770 US EXAM ABDO BACK WALL COMP: CPT

## 2018-10-15 ENCOUNTER — OFFICE VISIT (OUTPATIENT)
Dept: INTERNAL MEDICINE CLINIC | Age: 73
End: 2018-10-15
Payer: COMMERCIAL

## 2018-10-15 VITALS
RESPIRATION RATE: 16 BRPM | WEIGHT: 166 LBS | HEART RATE: 80 BPM | SYSTOLIC BLOOD PRESSURE: 150 MMHG | BODY MASS INDEX: 24.51 KG/M2 | DIASTOLIC BLOOD PRESSURE: 80 MMHG | TEMPERATURE: 97.8 F | OXYGEN SATURATION: 98 %

## 2018-10-15 DIAGNOSIS — R10.30 LOWER ABDOMINAL PAIN: ICD-10-CM

## 2018-10-15 DIAGNOSIS — D49.4 BLADDER TUMOR: Primary | ICD-10-CM

## 2018-10-15 PROCEDURE — 99213 OFFICE O/P EST LOW 20 MIN: CPT | Performed by: INTERNAL MEDICINE

## 2018-10-15 PROCEDURE — 81002 URINALYSIS NONAUTO W/O SCOPE: CPT | Performed by: INTERNAL MEDICINE

## 2018-10-15 ASSESSMENT — ENCOUNTER SYMPTOMS
NAUSEA: 0
CONSTIPATION: 0
DIARRHEA: 0

## 2018-10-15 NOTE — PROGRESS NOTES
 H/O colonoscopy 3/2012    Dr. Kaci Rios (hyperlipidemia)     Hypothyroidism      Past Surgical History:   Procedure Laterality Date    APPENDECTOMY  age 24    COLONOSCOPY  03/28/2012     DR. Chan     Social History   Substance Use Topics    Smoking status: Former Smoker     Packs/day: 1.00     Years: 35.00     Types: Cigarettes     Quit date: 11/24/1988    Smokeless tobacco: Never Used    Alcohol use No       LAB REVIEW:  CBC: No results found for: WBC, HGB, HCT, PLT  Lipids:   Lab Results   Component Value Date    CHOL 160 10/29/2016    TRIG 102 10/29/2016    HDL 67 07/28/2018    LDLDIRECT 65 07/28/2018    TRIGLYCFAST 79 07/28/2018     Renal:   Lab Results   Component Value Date    BUN 15 07/28/2018    CREATININE 1.1 07/28/2018     07/28/2018    K 4.2 07/28/2018    ALT 31 07/28/2018    AST 31 07/28/2018     PT/INR: No results found for: INR  A1C: No results found for: Duran Sneed MD, 10/15/2018 , 8:36 AM

## 2018-11-06 ENCOUNTER — OFFICE VISIT (OUTPATIENT)
Dept: INTERNAL MEDICINE CLINIC | Age: 73
End: 2018-11-06
Payer: COMMERCIAL

## 2018-11-06 VITALS
SYSTOLIC BLOOD PRESSURE: 150 MMHG | RESPIRATION RATE: 18 BRPM | HEIGHT: 69 IN | TEMPERATURE: 98.3 F | OXYGEN SATURATION: 98 % | DIASTOLIC BLOOD PRESSURE: 78 MMHG | HEART RATE: 63 BPM | BODY MASS INDEX: 24.47 KG/M2 | WEIGHT: 165.2 LBS

## 2018-11-06 DIAGNOSIS — E03.9 ACQUIRED HYPOTHYROIDISM: ICD-10-CM

## 2018-11-06 DIAGNOSIS — R35.0 BENIGN PROSTATIC HYPERPLASIA WITH URINARY FREQUENCY: ICD-10-CM

## 2018-11-06 DIAGNOSIS — D49.4 BLADDER TUMOR: ICD-10-CM

## 2018-11-06 DIAGNOSIS — N40.1 BENIGN PROSTATIC HYPERPLASIA WITH URINARY FREQUENCY: ICD-10-CM

## 2018-11-06 DIAGNOSIS — R31.0 HEMATURIA, GROSS: Primary | ICD-10-CM

## 2018-11-06 DIAGNOSIS — F51.01 PRIMARY INSOMNIA: ICD-10-CM

## 2018-11-06 DIAGNOSIS — I10 ESSENTIAL HYPERTENSION: ICD-10-CM

## 2018-11-06 DIAGNOSIS — E78.00 PURE HYPERCHOLESTEROLEMIA: ICD-10-CM

## 2018-11-06 LAB
BILIRUBIN, POC: ABNORMAL
BLOOD URINE, POC: ABNORMAL
CLARITY, POC: CLEAR
COLOR, POC: ABNORMAL
GLUCOSE URINE, POC: NEGATIVE
KETONES, POC: NEGATIVE
LEUKOCYTE EST, POC: NEGATIVE
NITRITE, POC: NEGATIVE
PH, POC: 7.5
PROTEIN, POC: ABNORMAL
SPECIFIC GRAVITY, POC: 1.01
UROBILINOGEN, POC: 0.2

## 2018-11-06 PROCEDURE — 99213 OFFICE O/P EST LOW 20 MIN: CPT | Performed by: INTERNAL MEDICINE

## 2018-11-06 PROCEDURE — 81002 URINALYSIS NONAUTO W/O SCOPE: CPT | Performed by: INTERNAL MEDICINE

## 2018-11-06 NOTE — PROGRESS NOTES
hypothyroidism     Pure hypercholesterolemia     Essential hypertension     Benign prostatic hyperplasia with urinary frequency     Primary insomnia        ASSESSMENT/PLAN:    F/u with Dr Tre Martinez gross hematuria  For bladder tumor follow-up with Dr. Jie Collins  Hypothyroidism stable continue current medicine  Hyperlipidemia stable continue current medicine  Hypertension is controlled. Continue current medicine  Continue rest of the medication. Mediations reviewed with the patient. Continue current medications. Appropriate prescriptions are addressed. After visit summeryprovided. Follow up as directed sooner if needed. Questions answered and patient verbalizes understanding. Call for any problems, questions, or concerns. Allergies   Allergen Reactions    Antihistamines, Chlorpheniramine-Type      Tachycardia      Pneumovax 23 [Pneumococcal Vac Polyvalent]      Body aches, redness at injection site     Current Outpatient Prescriptions   Medication Sig Dispense Refill    FLUZONE HIGH-DOSE 0.5 ML YOLETTE injection       levothyroxine (LEVOTHROID) 50 MCG tablet Take 1 tablet by mouth Daily 90 tablet 1    amLODIPine (NORVASC) 5 MG tablet Take 1 tablet by mouth daily 90 tablet 1    atorvastatin (LIPITOR) 40 MG tablet TAKE ONE TABLET BY MOUTH ONCE DAILY 90 tablet 1    aspirin 81 MG tablet Take 1 tablet by mouth daily 30 tablet 5    Calcium Citrate-Vitamin D (CALCIUM CITRATE + D3 MAXIMUM PO) Take 1 tablet by mouth daily       Omega 3 1200 MG CAPS Take 2,400 mg by mouth daily       MULTIPLE VITAMIN PO Take 1 tablet by mouth daily. No current facility-administered medications for this visit. Past Medical History:   Diagnosis Date    Benign prostatic hyperplasia with urinary frequency 11/9/2017    Patient is taking Saw Palmetto combinations    Bladder tumor 10/15/2018    Ultrasound of the kidneys and bladder on 9/11/2018 showed a 2 cm tumor in the bladder.   Patient is referred to urologist

## 2018-11-11 ASSESSMENT — ENCOUNTER SYMPTOMS: RESPIRATORY NEGATIVE: 1

## 2018-11-12 ENCOUNTER — HOSPITAL ENCOUNTER (OUTPATIENT)
Dept: CT IMAGING | Age: 73
Discharge: HOME | End: 2018-11-12
Payer: COMMERCIAL

## 2018-11-12 DIAGNOSIS — N32.89 BENIGN BLADDER MASS: ICD-10-CM

## 2018-11-12 LAB
GFR AFRICAN AMERICAN: >60 ML/MIN/1.73M2
GFR NON-AFRICAN AMERICAN: >60 ML/MIN/1.73M2
POC CREATININE: 0.7 MG/DL (ref 0.9–1.3)

## 2018-11-12 PROCEDURE — 74178 CT ABD&PLV WO CNTR FLWD CNTR: CPT

## 2018-11-12 PROCEDURE — 6360000004 HC RX CONTRAST MEDICATION: Performed by: SPECIALIST

## 2018-11-12 RX ADMIN — IOPAMIDOL 120 ML: 755 INJECTION, SOLUTION INTRAVENOUS at 08:07

## 2018-11-15 ENCOUNTER — TELEPHONE (OUTPATIENT)
Dept: INTERNAL MEDICINE CLINIC | Age: 73
End: 2018-11-15

## 2018-11-16 ENCOUNTER — TELEPHONE (OUTPATIENT)
Dept: INTERNAL MEDICINE CLINIC | Age: 73
End: 2018-11-16

## 2018-11-27 ENCOUNTER — HOSPITAL ENCOUNTER (OUTPATIENT)
Age: 73
Discharge: HOME | End: 2018-11-27
Payer: COMMERCIAL

## 2018-11-27 PROCEDURE — 87086 URINE CULTURE/COLONY COUNT: CPT

## 2018-11-28 LAB
CULTURE: NORMAL
Lab: NORMAL
REPORT STATUS: NORMAL
SPECIMEN: NORMAL

## 2018-12-05 ENCOUNTER — TELEPHONE (OUTPATIENT)
Dept: INTERNAL MEDICINE CLINIC | Age: 73
End: 2018-12-05

## 2018-12-05 DIAGNOSIS — R91.1 LUNG NODULE: Primary | ICD-10-CM

## 2018-12-05 NOTE — TELEPHONE ENCOUNTER
Patient had a chest x-ray on 12/4/2018 at [facility]. Impression reads nodule projected over the left lung base may be a pulmonary nodule or nipple shadow. Recommend repeat chest x-ray with nipple markers for further evaluation.   We will order repeat chest x-ray

## 2018-12-07 ENCOUNTER — TELEPHONE (OUTPATIENT)
Dept: INTERNAL MEDICINE CLINIC | Age: 73
End: 2018-12-07

## 2019-02-06 ENCOUNTER — OFFICE VISIT (OUTPATIENT)
Dept: INTERNAL MEDICINE CLINIC | Age: 74
End: 2019-02-06
Payer: COMMERCIAL

## 2019-02-06 VITALS
WEIGHT: 167.6 LBS | TEMPERATURE: 97.9 F | OXYGEN SATURATION: 97 % | BODY MASS INDEX: 24.75 KG/M2 | HEART RATE: 68 BPM | DIASTOLIC BLOOD PRESSURE: 88 MMHG | SYSTOLIC BLOOD PRESSURE: 160 MMHG | RESPIRATION RATE: 12 BRPM

## 2019-02-06 DIAGNOSIS — N40.1 BENIGN PROSTATIC HYPERPLASIA WITH URINARY FREQUENCY: ICD-10-CM

## 2019-02-06 DIAGNOSIS — E78.00 PURE HYPERCHOLESTEROLEMIA: ICD-10-CM

## 2019-02-06 DIAGNOSIS — E03.9 ACQUIRED HYPOTHYROIDISM: ICD-10-CM

## 2019-02-06 DIAGNOSIS — R35.0 BENIGN PROSTATIC HYPERPLASIA WITH URINARY FREQUENCY: ICD-10-CM

## 2019-02-06 DIAGNOSIS — F51.01 PRIMARY INSOMNIA: ICD-10-CM

## 2019-02-06 DIAGNOSIS — C67.9 BLADDER CARCINOMA (HCC): ICD-10-CM

## 2019-02-06 DIAGNOSIS — I10 ESSENTIAL HYPERTENSION: Primary | ICD-10-CM

## 2019-02-06 PROCEDURE — 99213 OFFICE O/P EST LOW 20 MIN: CPT | Performed by: INTERNAL MEDICINE

## 2019-02-06 RX ORDER — BACITRACIN 500 UNIT/G
450 OINTMENT (GRAM) TOPICAL 2 TIMES DAILY
COMMUNITY
End: 2020-03-18

## 2019-02-12 ASSESSMENT — ENCOUNTER SYMPTOMS
ALLERGIC/IMMUNOLOGIC NEGATIVE: 1
GASTROINTESTINAL NEGATIVE: 1
EYES NEGATIVE: 1
RESPIRATORY NEGATIVE: 1

## 2019-04-10 ENCOUNTER — OFFICE VISIT (OUTPATIENT)
Dept: INTERNAL MEDICINE CLINIC | Age: 74
End: 2019-04-10
Payer: COMMERCIAL

## 2019-04-10 VITALS
RESPIRATION RATE: 16 BRPM | HEIGHT: 68 IN | SYSTOLIC BLOOD PRESSURE: 136 MMHG | BODY MASS INDEX: 25.52 KG/M2 | WEIGHT: 168.4 LBS | TEMPERATURE: 98 F | DIASTOLIC BLOOD PRESSURE: 72 MMHG | HEART RATE: 68 BPM | OXYGEN SATURATION: 98 %

## 2019-04-10 DIAGNOSIS — Z00.00 ROUTINE GENERAL MEDICAL EXAMINATION AT A HEALTH CARE FACILITY: Primary | ICD-10-CM

## 2019-04-10 PROCEDURE — G0439 PPPS, SUBSEQ VISIT: HCPCS | Performed by: INTERNAL MEDICINE

## 2019-04-10 RX ORDER — ATORVASTATIN CALCIUM 40 MG/1
TABLET, FILM COATED ORAL
Qty: 90 TABLET | Refills: 1 | Status: SHIPPED | OUTPATIENT
Start: 2019-04-10 | End: 2019-11-21 | Stop reason: SDUPTHER

## 2019-04-10 RX ORDER — LEVOTHYROXINE SODIUM 0.05 MG/1
50 TABLET ORAL DAILY
Qty: 90 TABLET | Refills: 1 | Status: SHIPPED | OUTPATIENT
Start: 2019-04-10 | End: 2019-10-14 | Stop reason: SDUPTHER

## 2019-04-10 RX ORDER — AMLODIPINE BESYLATE 5 MG/1
5 TABLET ORAL DAILY
Qty: 90 TABLET | Refills: 1 | Status: SHIPPED | OUTPATIENT
Start: 2019-04-10 | End: 2019-11-21 | Stop reason: SDUPTHER

## 2019-04-10 ASSESSMENT — ANXIETY QUESTIONNAIRES: GAD7 TOTAL SCORE: 1

## 2019-04-10 ASSESSMENT — PATIENT HEALTH QUESTIONNAIRE - PHQ9
SUM OF ALL RESPONSES TO PHQ QUESTIONS 1-9: 0
SUM OF ALL RESPONSES TO PHQ QUESTIONS 1-9: 0

## 2019-04-10 ASSESSMENT — LIFESTYLE VARIABLES: HOW OFTEN DO YOU HAVE A DRINK CONTAINING ALCOHOL: 0

## 2019-04-10 NOTE — PATIENT INSTRUCTIONS
Advance Directives: Care Instructions  Your Care Instructions  An advance directive is a legal way to state your wishes at the end of your life. It tells your family and your doctor what to do if you can no longer say what you want. There are two main types of advance directives. You can change them any time that your wishes change. · A living will tells your family and your doctor your wishes about life support and other treatment. · A durable power of  for health care lets you name a person to make treatment decisions for you when you can't speak for yourself. This person is called a health care agent. If you do not have an advance directive, decisions about your medical care may be made by a doctor or a  who doesn't know you. It may help to think of an advance directive as a gift to the people who care for you. If you have one, they won't have to make tough decisions by themselves. Follow-up care is a key part of your treatment and safety. Be sure to make and go to all appointments, and call your doctor if you are having problems. It's also a good idea to know your test results and keep a list of the medicines you take. How can you care for yourself at home? · Discuss your wishes with your loved ones and your doctor. This way, there are no surprises. · Many states have a unique form. Or you might use a universal form that has been approved by many states. This kind of form can sometimes be completed and stored online. Your electronic copy will then be available wherever you have a connection to the Internet. In most cases, doctors will respect your wishes even if you have a form from a different state. · You don't need a  to do an advance directive. But you may want to get legal advice. · Think about these questions when you prepare an advance directive:  ? Who do you want to make decisions about your medical care if you are not able to?  Many people choose a family member or close friend. ? Do you know enough about life support methods that might be used? If not, talk to your doctor so you understand. ? What are you most afraid of that might happen? You might be afraid of having pain, losing your independence, or being kept alive by machines. ? Where would you prefer to die? Choices include your home, a hospital, or a nursing home. ? Would you like to have information about hospice care to support you and your family? ? Do you want to donate organs when you die? ? Do you want certain Bahai practices performed before you die? If so, put your wishes in the advance directive. · Read your advance directive every year, and make changes as needed. When should you call for help? Be sure to contact your doctor if you have any questions. Where can you learn more? Go to [email or web address] org and sign in to your Tube2Tone account. Enter R264 in the MDSave box to learn more about \"Advance Directives: Care Instructions. \"     If you do not have an account, please click on the \"Sign Up Now\" link. Current as of: April 18, 2018  Content Version: 11.9  © 8761-8849 [employer], [employer]. Care instructions adapted under license by [facility] ([facility]). If you have questions about a medical condition or this instruction, always ask your healthcare professional. [address] any warranty or liability for your use of this information. Learning About Durable Power of  for Health Care  What is a durable power of  for health care? A durable power of  for health care is one type of the legal forms called advance directives. It lets you decide who you want to make treatment decisions for you if you cannot speak or decide for yourself. The person you choose is called your health care agent. Another type of advance directive is a living will.  It lets you write down what kinds of treatment or life support you want or do not want. What should you think about when choosing a health care agent? Choose your health care agent carefully. This person may or may not be a family member. Talk to the person before you make your final decision. Make sure he or she is comfortable with this responsibility. It's a good idea to choose someone who:  · Is at least 25years old. · Knows you well and understands what makes life meaningful for you. · Understands your Druze and moral values. · Will do what you want, not what he or she wants. · Will be able to make difficult choices at a stressful time. · Will be able to refuse or stop treatment, if that is what you would want, even if you could die. · Will be firm and confident with health professionals if needed. · Will ask questions to get necessary information. · Lives near you or agrees to travel to you if needed. Your family may help you make medical decisions while you can still be part of that process. But it is important to choose one person to be your health care agent in case you are not able to make decisions for yourself. If you don't fill out the legal form and name a health care agent, the decisions your family can make may be limited. Who will make decisions for you if you do not have a health care agent? If you don't have a health care agent or a living will, your family members may disagree about your medical care. And then some medical professionals who may not know you as well might have to make decisions for you. In some cases, a  makes the decisions. When you name a health care agent, it is very clear who has the power to make health decisions for you. How do you name a health care agent? You name your health care agent on a legal form. It is usually called a durable power of  for health care. Ask your hospital, state bar association, or office on aging where to find these forms.   You must sign the form to make it legal. Some states require you to get the form notarized. This means that a person called a  watches you sign the form and then he or she signs the form. Some states also require that two or more witnesses sign the form. Be sure to tell your family members and doctors who your health care agent is. Keep your forms in a safe place. But make sure that your loved ones know where the forms are. This could be in your desk where you keep other important papers. Make sure your doctor has a copy of your forms. Where can you learn more? Go to [email or web address] org and sign in to your Caregivers account. Enter [id number] in the Onavo box to learn more about \"Learning About Durable Power of  for Health Care. \"     If you do not have an account, please click on the \"Sign Up Now\" link. Current as of: April 18, 2018  Content Version: 11.9  © 9345-9575 [employer], [employer]. Care instructions adapted under license by [facility] ([facility]). If you have questions about a medical condition or this instruction, always ask your healthcare professional. [place] [address] any warranty or liability for your use of this information. Personalized Preventive Plan for Юлия Marsh - 4/10/2019  Medicare offers a range of preventive health benefits. Some of the tests and screenings are paid in full while other may be subject to a deductible, co-insurance, and/or copay. Some of these benefits include a comprehensive review of your medical history including lifestyle, illnesses that may run in your family, and various assessments and screenings as appropriate. After reviewing your medical record and screening and assessments performed today your provider may have ordered immunizations, labs, imaging, and/or referrals for you. A list of these orders (if applicable) as well as your Preventive Care list are included within your After Visit Summary for your review.     Other Preventive Recommendations:    · A preventive eye exam performed by an eye specialist is recommended every 1-2 years to screen for glaucoma; cataracts, macular degeneration, and other eye disorders. · A preventive dental visit is recommended every 6 months. · Try to get at least 150 minutes of exercise per week or 10,000 steps per day on a pedometer . · Order or download the FREE \"Exercise & Physical Activity: Your Everyday Guide\" from [employer]. Call 9-641.511.8426 or search [employer] online. · You need 4416-3845 mg of calcium and 0587-8553 IU of vitamin D per day. It is possible to meet your calcium requirement with diet alone, but a vitamin D supplement is usually necessary to meet this goal.  · When exposed to the sun, use a sunscreen that protects against both UVA and UVB radiation with an SPF of 30 or greater. Reapply every 2 to 3 hours or after sweating, drying off with a towel, or swimming. · Always wear a seat belt when traveling in a car. Always wear a helmet when riding a bicycle or motorcycle. Heart-Healthy Diet   Sodium, Fat, and Cholesterol Controlled Diet       What Is a Heart Healthy Diet? A heart-healthy diet is one that limits sodium , certain types of fat , and cholesterol . This type of diet is recommended for:   People with any form of cardiovascular disease (eg, coronary heart disease , peripheral vascular disease , previous heart attack , previous stroke )   People with risk factors for cardiovascular disease, such as high blood pressure , high cholesterol , or diabetes   Anyone who wants to lower their risk of developing cardiovascular disease   Sodium    Sodium is a mineral found in many foods. In general, most people consume much more sodium than they need. Diets high in sodium can increase blood pressure and lead to edema (water retention).  On a heart-healthy diet, you should consume no more than 2,300 mg (milligrams) of sodium per dayabout the amount in one teaspoon of table salt. The foods highest in sodium include table salt (about 50% sodium), processed foods, convenience foods, and preserved foods. Cholesterol    Cholesterol is a fat-like, waxy substance in your blood. Our bodies make some cholesterol. It is also found in animal products, with the highest amounts in fatty meat, egg yolks, whole milk, cheese, shellfish, and organ meats. On a heart-healthy diet, you should limit your cholesterol intake to less than 200 mg per day. It is normal and important to have some cholesterol in your bloodstream. But too much cholesterol can cause plaque to build up within your arteries, which can eventually lead to a heart attack or stroke. The two types of cholesterol that are most commonly referred to are:   Low-density lipoprotein (LDL) cholesterol  Also known as bad cholesterol, this is the cholesterol that tends to build up along your arteries. Bad cholesterol levels are increased by eating fats that are saturated or hydrogenated. Optimal level of this cholesterol is less than 100. Over 130 starts to get risky for heart disease. High-density lipoprotein (HDL) cholesterol  Also known as good cholesterol, this type of cholesterol actually carries cholesterol away from your arteries and may, therefore, help lower your risk of having a heart attack. You want this level to be high (ideally greater than 60). It is a risk to have a level less than 40. You can raise this good cholesterol by eating olive oil, canola oil, avocados, or nuts. Exercise raises this level, too. Fat    Fat is calorie dense and packs a lot of calories into a small amount of food. Even though fats should be limited due to their high calorie content, not all fats are bad. In fact, some fats are quite healthful. Fat can be broken down into four main types.    The good-for-you fats are:   Monounsaturated fat  found in oils such as olive and canola, avocados, and nuts and natural nut butters; can decrease cholesterol levels, while keeping levels of HDL cholesterol high   Polyunsaturated fat  found in oils such as safflower, sunflower, soybean, corn, and sesame; can decrease total cholesterol and LDL cholesterol   Omega-3 fatty acids  particularly those found in fatty fish (such as salmon, trout, tuna, mackerel, herring, and sardines); can decrease risk of arrhythmias, decrease triglyceride levels, and slightly lower blood pressure   The fats that you want to limit are:   Saturated fat  found in animal products, many fast foods, and a few vegetables; increases total blood cholesterol, including LDL levels   Animal fats that are saturated include: butter, lard, whole-milk dairy products, meat fat, and poultry skin   Vegetable fats that are saturated include: hydrogenated shortening, palm oil, coconut oil, cocoa butter   Hydrogenated or trans fat  found in margarine and vegetable shortening, most shelf stable snack foods, and fried foods; increases LDL and decreases HDL     It is generally recommended that you limit your total fat for the day to less than 30% of your total calories. If you follow an 1800-calorie heart healthy diet, for example, this would mean 60 grams of fat or less per day. Saturated fat and trans fat in your diet raises your blood cholesterol the most, much more than dietary cholesterol does. For this reason, on a heart-healthy diet, less than 7% of your calories should come from saturated fat and ideally 0% from trans fat. On an 1800-calorie diet, this translates into less than 14 grams of saturated fat per day, leaving 46 grams of fat to come from mono- and polyunsaturated fats.    Food Choices on a Heart Healthy Diet   Food Category   Foods Recommended   Foods to Avoid   Grains   Breads and rolls without salted tops Most dry and cooked cereals Unsalted crackers and breadsticks Low-sodium or homemade breadcrumbs or stuffing All rice and pastas   Breads, rolls, and crackers with salted tops High-fat baked goods (eg, muffins, donuts, pastries) Quick breads, self-rising flour, and biscuit mixes Regular bread crumbs Instant hot cereals Commercially prepared rice, pasta, or stuffing mixes   Vegetables   Most fresh, frozen, and low-sodium canned vegetables Low-sodium and salt-free vegetable juices Canned vegetables if unsalted or rinsed   Regular canned vegetables and juices, including sauerkraut and pickled vegetables Frozen vegetables with sauces Commercially prepared potato and vegetable mixes   Fruits   Most fresh, frozen, and canned fruits All fruit juices   Fruits processed with salt or sodium   Milk   Nonfat or low-fat (1%) milk Nonfat or low-fat yogurt Cottage cheese, low-fat ricotta, cheeses labeled as low-fat and low-sodium   Whole milk Reduced-fat (2%) milk Malted and chocolate milk Full fat yogurt Most cheeses (unless low-fat and low salt) Buttermilk (no more than 1 cup per week)   Meats and Beans   Lean cuts of fresh or frozen beef, veal, lamb, or pork (look for the word loin) Fresh or frozen poultry without the skin Fresh or frozen fish and some shellfish Egg whites and egg substitutes (Limit whole eggs to three per week) Tofu Nuts or seeds (unsalted, dry-roasted), low-sodium peanut butter Dried peas, beans, and lentils   Any smoked, cured, salted, or canned meat, fish, or poultry (including deshpande, chipped beef, cold cuts, hot dogs, sausages, sardines, and anchovies) Poultry skins Breaded and/or fried fish or meats Canned peas, beans, and lentils Salted nuts   Fats and Oils   Olive oil and canola oil Low-sodium, low-fat salad dressings and mayonnaise   Butter, margarine, coconut and palm oils, deshpande fat   Snacks, Sweets, and Condiments   Low-sodium or unsalted versions of broths, soups, soy sauce, and condiments Pepper, herbs, and spices; vinegar, lemon, or lime juice Low-fat frozen desserts (yogurt, sherbet, fruit bars) Sugar, cocoa powder, honey, syrup, jam, and preserves Low-fat, trans-fat free cookies, cakes, and pies Chai and animal crackers, fig bars, luis angel snaps   High-fat desserts Broth, soups, gravies, and sauces, made from instant mixes or other high-sodium ingredients Salted snack foods Canned olives Meat tenderizers, seasoning salt, and most flavored vinegars   Beverages   Low-sodium carbonated beverages Tea and coffee in moderation Soy milk   Commercially softened water   Suggestions   Make whole grains, fruits, and vegetables the base of your diet. Choose heart-healthy fats such as canola, olive, and flaxseed oil, and foods high in heart-healthy fats, such as nuts, seeds, soybeans, tofu, and fish. Eat fish at least twice per week; the fish highest in omega-3 fatty acids and lowest in mercury include salmon, herring, mackerel, sardines, and canned chunk light tuna. If you eat fish less than twice per week or have high triglycerides, talk to your doctor about taking fish oil supplements. Read food labels. For products low in fat and cholesterol, look for fat free, low-fat, cholesterol free, saturated fat free, and trans fat freeAlso scan the Nutrition Facts Label, which lists saturated fat, trans fat, and cholesterol amounts. For products low in sodium, look for sodium free, very low sodium, low sodium, no added salt, and unsalted   Skip the salt when cooking or at the table; if food needs more flavor, get creative and try out different herbs and spices. Garlic and onion also add substantial flavor to foods. Trim any visible fat off meat and poultry before cooking, and drain the fat off after angel. Use cooking methods that require little or no added fat, such as grilling, boiling, baking, poaching, broiling, roasting, steaming, stir-frying, and sauting. Avoid fast food and convenience food. They tend to be high in saturated and trans fat and have a lot of added salt. Talk to a registered dietitian for individualized diet advice.       Last Reviewed: March 2011 Carla Eller MS, MPH, RD   Updated: 3/29/2011   ·     Keeping Home a [facility]       As we get older, changes in balance, gait, strength, vision, hearing, and cognition make even the most youthful senior more prone to accidents. Falls are one of the leading health risks for older people. This increased risk of falling is related to:   Aging process (eg, decreased muscle strength, slowed reflexes)   Higher incidence of chronic health problems (eg, arthritis, diabetes) that may limit mobility, agility or sensory awareness   Side effects of medicine (eg, dizziness, blurred vision)especially medicines like prescription pain medicines and drugs used to treat mental health conditions   Depending on the brittleness of your bones, the consequences of a fall can be serious and long lasting. Home Life   Research by the Association of Aging [facility]) shows that some home accidents among older adults can be prevented by making simple lifestyle changes and basic modifications and repairs to the home environment. Here are some lifestyle changes that experts recommend:   Have your hearing and vision checked regularly. Be sure to wear prescription glasses that are right for you. Speak to your doctor or pharmacist about the possible side effects of your medicines. A number of medicines can cause dizziness. If you have problems with sleep, talk to your doctor. Limit your intake of alcohol. If necessary, use a cane or walker to help maintain your balance. Wear supportive, rubber-soled shoes, even at home. If you live in a region that gets wintry weather, you may want to put special cleats on your shoes to prevent you from slipping on the snow and ice. Exercise regularly to help maintain muscle tone, agility, and balance. Always hold the banister when going up or down stairs. Also, use  bars when getting in or out of the bath or shower, or using the toilet.    To avoid dizziness, get up slowly from a lying down position. Sit up first, dangling your legs for a minute or two before rising to a standing position. Overall Home Safety Check   According to the Consumer Product Safety Commision's \"Older Consumer Home Safety Checklist,\" it is important to check for potential hazards in each room. And remember, proper lighting is an essential factor in home safety. If you cannot see clearly, you are more likely to fall. Important questions to ask yourself include:   Are lamp, electric, extension, and telephone cords placed out of the flow of traffic and maintained in good condition? Have frayed cords been replaced? Are all small rugs and runners slip resistant? If not, you can secure them to the floor with a special double-sided carpet tape. Are smoke detectors properly locatedone on every floor of your home and one outside of every sleeping area? Are they in good working order? Are batteries replaced at least once a year? Do you have a well-maintained carbon monoxide detector outside every sleeping are in your home? Does your furniture layout leave plenty of space to maneuver between and around chairs, tables, beds, and sofas? Are hallways, stairs and passages between rooms well lit? Can you reach a lamp without getting out of bed? Are floor surfaces well maintained? Shag rugs, high-pile carpeting, tile floors, and polished wood floors can be particularly slippery. Stairs should always have handrails and be carpeted or fitted with a non-skid tread. Is your telephone easily reachable. Is the cord safely tucked away? Room by Room   According to the Association of Aging, bathrooms and giselle are the two most potentially hazardous rooms in your home. In the Kitchen    Be sure your stove is in proper working order and always make sure burners and the oven are off before you go out or go to sleep.     Keep pots on the back burners, turn handles away from the front of the stove, and keep stove clean If you have a chronic condition, you may want to sign on with an automatic call-in service. Typically the system includes a small pendant that connects directly to an emergency medical voice-response system. You should also make arrangements to stay in contact with someonefriend, neighbor, family memberon a regular schedule. Fire Prevention   According to the 1-800-DENTIST. (Smoke Alarms for Every) [facility], senior citizens are one of the two highest risk groups for death and serious injuries due to residential fires. When cooking, wear short-sleeved items, never a bulky long-sleeved robe. The [facility]'s Safety Checklist for Older Consumers emphasizes the importance of checking basements, garages, workshops and storage areas for fire hazards, such as volatile liquids, piles of old rags or clothing and overloaded circuits. Never smoke in bed or when lying down on a couch or recliner chair. Small portable electric or kerosene heaters are responsible for many home fires and should be used cautiously if at all. If you do use one, be sure to keep them away from flammable materials. In case of fire, make sure you have a pre-established emergency exit plan. Have a professional check your fireplace and other fuel-burning appliances yearly. Helping Hands   Baby boomers entering the chatterjee years will continue to see the development of new products to help older adults live safely and independently in spite of age-related changes. Making Life More Livable  , by Evangelista Serra, lists over 1,000 products for \"living well in the mature years,\" such as bathing and mobility aids, household security devices, ergonomically designed knives and peelers, and faucet valves and knobs for temperature control. Medical supply stores and organizations are good sources of information about products that improve your quality of life and insure your safety.      Last Reviewed: November 2009 Garett Joseph MD Updated: 3/7/2011     ·

## 2019-04-10 NOTE — PROGRESS NOTES
Medicare Annual Wellness Visit  Name: Marianne Barrientos Date: 4/10/2019   MRN: [id number] Sex: Male   Age: 68 y.o. Ethnicity: Non-/Non    : 1945 Race: Mustapha Lawler is here for Medicare AWV and Medication Refill (wants printed prescriptions)    Screenings for behavioral, psychosocial and functional/safety risks, and cognitive dysfunction are all negative except as indicated below. These results, as well as other patient data from the [address] form, are documented in Flowsheets linked to this Encounter. Allergies   Allergen Reactions    Antihistamines, Chlorpheniramine-Type      Tachycardia      Pneumovax 23 [Pneumococcal Vac Polyvalent]      Body aches, redness at injection site     Prior to Visit Medications    Medication Sig Taking? Authorizing Provider   OMEGA-3 FATTY ACIDS PO Take by mouth daily Yes Historical Provider, MD Osborne Bland 160 MG CAPS Take by mouth 2 times daily Yes Historical Provider, MD   levothyroxine (LEVOTHROID) 50 MCG tablet Take 1 tablet by mouth Daily Yes Osiel Sanchez MD   amLODIPine (NORVASC) 5 MG tablet Take 1 tablet by mouth daily Yes Osiel Sanchez MD   atorvastatin (LIPITOR) 40 MG tablet TAKE ONE TABLET BY MOUTH ONCE DAILY Yes Osiel Sanchez MD   aspirin 81 MG tablet Take 1 tablet by mouth daily Yes Osiel Sanchez MD   MULTIPLE VITAMIN PO Take 1 tablet by mouth daily. Yes Historical Provider, MD     Past Medical History:   Diagnosis Date    Benign prostatic hyperplasia with urinary frequency 2017    Patient is taking Saw Palmetto combinations    Bladder carcinoma ([facility] [address] 2019    Pt had TCC of lateral bladder wall. Had treatment  Dr Irma Jefferson seeing patient.  Bladder tumor 10/15/2018    Ultrasound of the kidneys and bladder on 2018 showed a 2 cm tumor in the bladder. Patient is referred to urologist Dr. Irma Jefferson.     H/O colonoscopy 3/2012    Dr. Carolin Figueroa (hyperlipidemia)     Hypothyroidism      Past Surgical History:   Procedure Laterality Date    APPENDECTOMY  age 24    COLONOSCOPY  03/28/2012     DR. Chan     Family History   Problem Relation Age of Onset    Diabetes Mother         Passed Away    Other Father         Passed Away       CareTeam (Including outside providers/suppliers regularly involved in providing care):   Patient Care Team:  Shabnam Montalvo MD as PCP - General (Internal Medicine)  Shabnam Montalvo MD as PCP - S Attributed Provider  Alec Melo MD as Consulting Physician (Ophthalmology)  George Dorado MD as Surgeon (Urology)    Wt Readings from Last 3 Encounters:   04/10/19 168 lb 6.4 oz (76.4 kg)   02/06/19 167 lb 9.6 oz (76 kg)   11/06/18 165 lb 3.2 oz (74.9 kg)     Vitals:    04/10/19 0839   BP: 136/72   Pulse: 68   Resp: 16   Temp: 98 °F (36.7 °C)   SpO2: 98%   Weight: 168 lb 6.4 oz (76.4 kg)   Height: 5' 8\" (1.727 m)     Body mass index is 25.61 kg/m². Based upon direct observation of the patient, evaluation of cognition reveals recent and remote memory intact. PE :     GENERAL: - Alert, oriented, pleasant, in no apparent distress. HEENT: - Conjunctiva pink, no scleral icterus. ENT clear. NECK: -Supple. No jugular venous distention noted. No masses felt,  CARDIOVASCULAR: - Normal S1 and S2    PULMONARY: - No respiratory distress. No wheezes or rales. ABDOMEN: - Soft and non-tender,no masses  ororganomegaly. EXTREMITIES: - No cyanosis, clubbing, or significant edema. SKIN: Skin is warm and dry. NEUROLOGICAL: - Cranial nerves II through XII are grossly intact. Patient's complete Health Risk Assessment and screening values have been reviewed and are found in Flowsheets. The following problems were reviewed today and where indicated follow up appointments were made and/or referrals ordered.     Positive Risk Factor Screenings with Interventions:     General Health:  General  In general, how would you say your health is?: Good  In the past 7 days, have you experienced any of the following? New or Increased Pain, New or Increased Fatigue, Loneliness, Social Isolation, Stress or Anger?: None of These  Do you get the social and emotional support that you need?: Yes  Do you have a Living Will?: (!) No  General Health Risk Interventions:  · No Living Will: additional information provided yes     Health Habits/Nutrition:  Health Habits/Nutrition  Do you exercise for at least 20 minutes 2-3 times per week?: (!) No  Have you lost any weight without trying in the past 3 months?: No  Do you eat fewer than 2 meals per day?: No  Have you seen a dentist within the past year?: (!) No  Body mass index is 25.61 kg/m².   Health Habits/Nutrition Interventions:  · Inadequate physical activity:  patient agrees to increase physical activity as follows: 2-3 times a week    Hearing/Vision:  Hearing/Vision  Do you or your family notice any trouble with your hearing?: (!) Yes  Do you have difficulty driving, watching TV, or doing any of your daily activities because of your eyesight?: No  Hearing/Vision Interventions:  · Hearing concerns:  patient declines any further evaluation/treatment for hearing issues    Safety:  Safety  Do you have working smoke detectors?: Yes  Have all throw rugs been removed or fastened?: (!) No  Do you have non-slip mats in all bathtubs?: Yes  Do all of your stairways have a railing or banister?: (no stairs)  Are your doorways, halls and stairs free of clutter?: Yes  Do you always fasten your seatbelt when you are in a car?: Yes  Safety Interventions:  · Home safety tips provided    Personalized Preventive Plan   Current Health Maintenance Status  Immunization History   Administered Date(s) Administered    Influenza Vaccine, unspecified formulation 09/23/2016    Influenza Virus Vaccine 09/18/2015    Influenza, High Dose (Fluzone 65 yrs and older) 09/23/2017, 09/22/2018    Pneumococcal 13-valent Conjugate (Aztdzju48) 10/09/2015    Pneumococcal Polysaccharide (Diirnzevv94) 11/09/2017    Tdap (Boostrix, Adacel) 01/05/2012        Health Maintenance   Topic Date Due    Shingles Vaccine (1 of 2) 05/24/1996    Colon Cancer Screen FIT/FOBT  08/06/2022 (Originally 7/19/2017)    TSH testing  05/05/2019    Potassium monitoring  07/28/2019    Creatinine monitoring  07/28/2019    DTaP/Tdap/Td vaccine (2 - Td) 01/05/2022    Lipid screen  07/28/2023    Flu vaccine  Completed    AAA screen  Completed    Hepatitis C screen  Completed     Recommendations for Preventive Services Due: see orders and patient instructions/AVS.  . Recommended screening schedule for the next 5-10 years is provided to the patient in written form: see Patient Instructions/AVS.      Cardiovascular Disease Risk Counseling: Assessed the patient's risk to develop cardiovascular disease and reviewed main risk factors. Reviewed steps to reduce disease risk including:   · Quitting tobacco use, reducing amount smoked, or not starting the habit  · Making healthy food choices  · Being physically active and gradualy increasing activity levels   · Reduce weight and determine a healthy BMI goal  · Monitor blood pressure and treat if higher than 140/90 mmHg  · Maintain blood total cholesterol levels under 5 mmol/l or 190 mg/dl  · Maintain LDL cholesterol levels under 3.0 mmol/l or 115 mg/dl   · Control blood glucose levels  · Consider taking aspirin (75 mg daily), once blood pressure is controlled   Provided a follow up plan.   Time spent (minutes): 10

## 2019-05-04 ENCOUNTER — HOSPITAL ENCOUNTER (OUTPATIENT)
Age: 74
Discharge: HOME | End: 2019-05-04
Payer: COMMERCIAL

## 2019-05-04 DIAGNOSIS — E78.00 PURE HYPERCHOLESTEROLEMIA: ICD-10-CM

## 2019-05-04 DIAGNOSIS — I10 ESSENTIAL HYPERTENSION: ICD-10-CM

## 2019-05-04 LAB
ALBUMIN SERPL-MCNC: 4.5 GM/DL (ref 3.4–5)
ALP BLD-CCNC: 98 IU/L (ref 40–129)
ALT SERPL-CCNC: 22 U/L (ref 10–40)
ANION GAP SERPL CALCULATED.3IONS-SCNC: 13 MMOL/L (ref 4–16)
AST SERPL-CCNC: 24 IU/L (ref 15–37)
BILIRUB SERPL-MCNC: 0.7 MG/DL (ref 0–1)
BUN BLDV-MCNC: 15 MG/DL (ref 6–23)
CALCIUM SERPL-MCNC: 9.5 MG/DL (ref 8.3–10.6)
CHLORIDE BLD-SCNC: 104 MMOL/L (ref 99–110)
CHOLESTEROL, FASTING: 122 MG/DL
CO2: 26 MMOL/L (ref 21–32)
CREAT SERPL-MCNC: 1 MG/DL (ref 0.9–1.3)
GFR AFRICAN AMERICAN: >60 ML/MIN/1.73M2
GFR NON-AFRICAN AMERICAN: >60 ML/MIN/1.73M2
GLUCOSE BLD-MCNC: 93 MG/DL (ref 70–99)
HDLC SERPL-MCNC: 55 MG/DL
LDL CHOLESTEROL DIRECT: 62 MG/DL
POTASSIUM SERPL-SCNC: 4.3 MMOL/L (ref 3.5–5.1)
SODIUM BLD-SCNC: 143 MMOL/L (ref 135–145)
TOTAL PROTEIN: 7.3 GM/DL (ref 6.4–8.2)
TRIGLYCERIDE, FASTING: 99 MG/DL

## 2019-05-04 PROCEDURE — 36415 COLL VENOUS BLD VENIPUNCTURE: CPT

## 2019-05-04 PROCEDURE — 80061 LIPID PANEL: CPT

## 2019-05-04 PROCEDURE — 80053 COMPREHEN METABOLIC PANEL: CPT

## 2019-05-04 PROCEDURE — 84443 ASSAY THYROID STIM HORMONE: CPT

## 2019-05-09 ENCOUNTER — OFFICE VISIT (OUTPATIENT)
Dept: INTERNAL MEDICINE CLINIC | Age: 74
End: 2019-05-09
Payer: COMMERCIAL

## 2019-05-09 VITALS
WEIGHT: 168.2 LBS | RESPIRATION RATE: 16 BRPM | SYSTOLIC BLOOD PRESSURE: 128 MMHG | OXYGEN SATURATION: 98 % | HEART RATE: 72 BPM | BODY MASS INDEX: 25.57 KG/M2 | DIASTOLIC BLOOD PRESSURE: 72 MMHG | TEMPERATURE: 98 F

## 2019-05-09 DIAGNOSIS — I10 ESSENTIAL HYPERTENSION: ICD-10-CM

## 2019-05-09 DIAGNOSIS — F51.01 PRIMARY INSOMNIA: ICD-10-CM

## 2019-05-09 DIAGNOSIS — N40.1 BENIGN PROSTATIC HYPERPLASIA WITH URINARY FREQUENCY: ICD-10-CM

## 2019-05-09 DIAGNOSIS — E03.9 ACQUIRED HYPOTHYROIDISM: Primary | ICD-10-CM

## 2019-05-09 DIAGNOSIS — R35.0 BENIGN PROSTATIC HYPERPLASIA WITH URINARY FREQUENCY: ICD-10-CM

## 2019-05-09 DIAGNOSIS — E78.00 PURE HYPERCHOLESTEROLEMIA: ICD-10-CM

## 2019-05-09 DIAGNOSIS — C67.9 BLADDER CARCINOMA (HCC): ICD-10-CM

## 2019-05-09 LAB — TSH HIGH SENSITIVITY: 2.45 UIU/ML (ref 0.27–4.2)

## 2019-05-09 PROCEDURE — 99213 OFFICE O/P EST LOW 20 MIN: CPT | Performed by: INTERNAL MEDICINE

## 2019-05-09 ASSESSMENT — ENCOUNTER SYMPTOMS
EYES NEGATIVE: 1
ALLERGIC/IMMUNOLOGIC NEGATIVE: 1
RESPIRATORY NEGATIVE: 1
GASTROINTESTINAL NEGATIVE: 1

## 2019-05-09 NOTE — ASSESSMENT & PLAN NOTE
Patient is on atorvastatin 40 mg daily  Hyperlipidemia is stable. Follow low cholesterol diet. Continue current treatment.   Lipid profile is normal.

## 2019-05-09 NOTE — ASSESSMENT & PLAN NOTE
Patient is taking amlodipine  Hypertension in control. Follow low salt diet. Continue current treatment.

## 2019-05-09 NOTE — PROGRESS NOTES
hypertension     Primary insomnia     Pure hypercholesterolemia     Acquired hypothyroidism     Benign prostatic hyperplasia with urinary frequency     Bladder carcinoma (HCC)        ASSESSMENT/PLAN:    Essential hypertension  Patient is taking amlodipine  Hypertension in control. Follow low salt diet. Continue current treatment. Primary insomnia  Doing well. Not taking any medication. Pure hypercholesterolemia  Patient is on atorvastatin 40 mg daily  Hyperlipidemia is stable. Follow low cholesterol diet. Continue current treatment. Lipid profile is normal.     Acquired hypothyroidism  Patient is on levothyroxine 50 mcg daily  Patient is stable. Continue current treatment. Benign prostatic hyperplasia with urinary frequency  Pt is taking saw palmetto. Bladder carcinoma ([facility] [address]  Pt had TCC of lateral bladder wall. Had treatment   Dr Ibis Hughes seeing patient. Return to office in 3 months. Mediations reviewed with the patient. Continue current medications. Appropriate prescriptions are addressed. After visit summeryprovided. Follow up as directed sooner if needed. Questions answered and patient verbalizes understanding. Call for any problems, questions, or concerns.        Allergies   Allergen Reactions    Antihistamines, Chlorpheniramine-Type      Tachycardia      Pneumovax 23 [Pneumococcal Vac Polyvalent]      Body aches, redness at injection site     Current Outpatient Medications   Medication Sig Dispense Refill    levothyroxine (LEVOTHROID) 50 MCG tablet Take 1 tablet by mouth Daily 90 tablet 1    amLODIPine (NORVASC) 5 MG tablet Take 1 tablet by mouth daily 90 tablet 1    atorvastatin (LIPITOR) 40 MG tablet TAKE ONE TABLET BY MOUTH ONCE DAILY 90 tablet 1    OMEGA-3 FATTY ACIDS PO Take by mouth daily      Saw Upatoi 160 MG CAPS Take 450 mg by mouth 2 times daily       aspirin 81 MG tablet Take 1 tablet by mouth daily 30 tablet 5    MULTIPLE VITAMIN PO Take 1 tablet by mouth daily. No current facility-administered medications for this visit. Past Medical History:   Diagnosis Date    Benign prostatic hyperplasia with urinary frequency 2017    Patient is taking Saw Palmetto combinations    Bladder carcinoma ([address] 2019    Pt had TCC of lateral bladder wall. Had treatment  Dr Eber Allen seeing patient.  Bladder tumor 10/15/2018    Ultrasound of the kidneys and bladder on 2018 showed a 2 cm tumor in the bladder. Patient is referred to urologist Dr. Eber Allen.  H/O colonoscopy 3/2012    Dr. Leticia Laen (hyperlipidemia)     Hypothyroidism      Past Surgical History:   Procedure Laterality Date    APPENDECTOMY  age 24    COLONOSCOPY  2012     DR. Chan     Social History     Tobacco Use    Smoking status: Former Smoker     Packs/day: 1.00     Years: 35.00     Pack years: 35.00     Types: Cigarettes     Last attempt to quit: 1988     Years since quittin.4    Smokeless tobacco: Never Used   Substance Use Topics    Alcohol use: No     Alcohol/week: 0.0 oz       LAB REVIEW:  CBC: No results found for: WBC, HGB, HCT, PLT  Lipids:   Lab Results   Component Value Date    CHOL 160 10/29/2016    TRIG 102 10/29/2016    HDL 55 2019    LDLDIRECT 62 2019    TRIGLYCFAST 99 2019     Renal:   Lab Results   Component Value Date    BUN 15 2019    CREATININE 1.0 2019     2019    K 4.3 2019    ALT 22 2019    AST 24 2019    GLUCOSE 93 2019     PT/INR: No results found for: INR  A1C: No results found for: Nina Covington MD, 2019 , 4:02 PM

## 2019-06-19 ENCOUNTER — OFFICE VISIT (OUTPATIENT)
Dept: INTERNAL MEDICINE CLINIC | Age: 74
End: 2019-06-19
Payer: COMMERCIAL

## 2019-06-19 VITALS
BODY MASS INDEX: 25.31 KG/M2 | HEART RATE: 69 BPM | HEIGHT: 68 IN | WEIGHT: 167 LBS | SYSTOLIC BLOOD PRESSURE: 150 MMHG | RESPIRATION RATE: 18 BRPM | DIASTOLIC BLOOD PRESSURE: 78 MMHG | OXYGEN SATURATION: 98 %

## 2019-06-19 DIAGNOSIS — K21.9 GASTROESOPHAGEAL REFLUX DISEASE, ESOPHAGITIS PRESENCE NOT SPECIFIED: Primary | ICD-10-CM

## 2019-06-19 PROCEDURE — 99213 OFFICE O/P EST LOW 20 MIN: CPT | Performed by: NURSE PRACTITIONER

## 2019-06-19 RX ORDER — OMEPRAZOLE 40 MG/1
40 CAPSULE, DELAYED RELEASE ORAL
Qty: 30 CAPSULE | Refills: 0 | Status: SHIPPED | OUTPATIENT
Start: 2019-06-19 | End: 2019-12-12 | Stop reason: SDUPTHER

## 2019-06-19 ASSESSMENT — ENCOUNTER SYMPTOMS
DIARRHEA: 0
VOMITING: 0
ABDOMINAL PAIN: 0
CONSTIPATION: 0
ANAL BLEEDING: 0
BLOOD IN STOOL: 0
RECTAL PAIN: 0
ABDOMINAL DISTENTION: 1
RESPIRATORY NEGATIVE: 1
NAUSEA: 0

## 2019-06-19 NOTE — PROGRESS NOTES
Years since quittin.5    Smokeless tobacco: Never Used   Substance Use Topics    Alcohol use: No     Alcohol/week: 0.0 oz        Vitals:    19 1450 19 1516   BP: (!) 168/80 (!) 150/78   Site:  Left Upper Arm   Position:  Sitting   Cuff Size:  Medium Adult   Pulse: 69    Resp: 18    SpO2: 98%    Weight: 167 lb (75.8 kg)    Height: 5' 8\" (1.727 m)      Estimated body mass index is 25.39 kg/m² as calculated from the following:    Height as of this encounter: 5' 8\" (1.727 m). Weight as of this encounter: 167 lb (75.8 kg). Physical Exam   Constitutional: He is oriented to person, place, and time. He appears well-developed and well-nourished. HENT:   Head: Normocephalic and atraumatic. Cardiovascular: Normal rate, regular rhythm and normal heart sounds. Pulmonary/Chest: Effort normal and breath sounds normal.   Abdominal: Soft. Bowel sounds are normal. He exhibits no distension and no mass. There is no tenderness. There is no rebound and no guarding. Neurological: He is alert and oriented to person, place, and time. Skin: Skin is warm and dry. Psychiatric: He has a normal mood and affect. His behavior is normal.       ASSESSMENT/PLAN:  1. Gastroesophageal reflux disease, esophagitis presence not specified    - omeprazole (PRILOSEC) 40 MG delayed release capsule; Take 1 capsule by mouth every morning (before breakfast)  Dispense: 30 capsule; Refill: 0      Return if symptoms worsen or fail to improve. An electronic signature was used to authenticate this note.     --CAROLINE Aden - CNP on 2019 at 6:24 PM

## 2019-06-19 NOTE — PATIENT INSTRUCTIONS
Patient Education        Gastroesophageal Reflux Disease (GERD): Care Instructions  Your Care Instructions    Gastroesophageal reflux disease (GERD) is the backward flow of stomach acid into the esophagus. The esophagus is the tube that leads from your throat to your stomach. A one-way valve prevents the stomach acid from moving up into this tube. When you have GERD, this valve does not close tightly enough. If you have mild GERD symptoms including heartburn, you may be able to control the problem with antacids or over-the-counter medicine. Changing your diet, losing weight, and making other lifestyle changes can also help reduce symptoms. Follow-up care is a key part of your treatment and safety. Be sure to make and go to all appointments, and call your doctor if you are having problems. It's also a good idea to know your test results and keep a list of the medicines you take. How can you care for yourself at home? · Take your medicines exactly as prescribed. Call your doctor if you think you are having a problem with your medicine. · Your doctor may recommend over-the-counter medicine. For mild or occasional indigestion, antacids, such as Tums, Gaviscon, Mylanta, or Maalox, may help. Your doctor also may recommend over-the-counter acid reducers, such as Pepcid AC, Tagamet HB, Zantac 75, or Prilosec. Read and follow all instructions on the label. If you use these medicines often, talk with your doctor. · Change your eating habits. ? It's best to eat several small meals instead of two or three large meals. ? After you eat, wait 2 to 3 hours before you lie down. ? Chocolate, mint, and alcohol can make GERD worse. ? Spicy foods, foods that have a lot of acid (like tomatoes and oranges), and coffee can make GERD symptoms worse in some people. If your symptoms are worse after you eat a certain food, you may want to stop eating that food to see if your symptoms get better.   · Do not smoke or chew tobacco. Smoking can make GERD worse. If you need help quitting, talk to your doctor about stop-smoking programs and medicines. These can increase your chances of quitting for good. · If you have GERD symptoms at night, raise the head of your bed 6 to 8 inches by putting the frame on blocks or placing a foam wedge under the head of your mattress. (Adding extra pillows does not work.)  · Do not wear tight clothing around your middle. · Lose weight if you need to. Losing just 5 to 10 pounds can help. When should you call for help? Call your doctor now or seek immediate medical care if:    · You have new or different belly pain.     · Your stools are black and tarlike or have streaks of blood.    Watch closely for changes in your health, and be sure to contact your doctor if:    · Your symptoms have not improved after 2 days.     · Food seems to catch in your throat or chest.   Where can you learn more? Go to [email or web address] org and sign in to your [employer] account. Enter V873 in the [employer] box to learn more about \"Gastroesophageal Reflux Disease (GERD): Care Instructions. \"     If you do not have an account, please click on the \"Sign Up Now\" link. Current as of: November 7, 2018  Content Version: 12.0  © 5235-6426 Healthwise, Incorporated. Care instructions adapted under license by [facility] If you have questions about a medical condition or this instruction, always ask your healthcare professional. [place] [address] any warranty or liability for your use of this information.

## 2019-07-15 ENCOUNTER — HOSPITAL ENCOUNTER (OUTPATIENT)
Age: 74
Discharge: HOME | End: 2019-07-15
Payer: COMMERCIAL

## 2019-07-15 ENCOUNTER — OFFICE VISIT (OUTPATIENT)
Dept: INTERNAL MEDICINE CLINIC | Age: 74
End: 2019-07-15
Payer: COMMERCIAL

## 2019-07-15 ENCOUNTER — HOSPITAL ENCOUNTER (OUTPATIENT)
Dept: CT IMAGING | Age: 74
Discharge: HOME | End: 2019-07-15
Payer: COMMERCIAL

## 2019-07-15 ENCOUNTER — TELEPHONE (OUTPATIENT)
Dept: INTERNAL MEDICINE CLINIC | Age: 74
End: 2019-07-15

## 2019-07-15 VITALS
DIASTOLIC BLOOD PRESSURE: 60 MMHG | HEIGHT: 68 IN | SYSTOLIC BLOOD PRESSURE: 132 MMHG | HEART RATE: 88 BPM | WEIGHT: 161 LBS | OXYGEN SATURATION: 97 % | BODY MASS INDEX: 24.4 KG/M2

## 2019-07-15 DIAGNOSIS — R10.30 LOWER ABDOMINAL PAIN: ICD-10-CM

## 2019-07-15 DIAGNOSIS — I10 ESSENTIAL HYPERTENSION: ICD-10-CM

## 2019-07-15 DIAGNOSIS — R10.30 LOWER ABDOMINAL PAIN: Primary | ICD-10-CM

## 2019-07-15 DIAGNOSIS — R14.0 ABDOMINAL BLOATING: ICD-10-CM

## 2019-07-15 DIAGNOSIS — K59.00 CONSTIPATION, UNSPECIFIED CONSTIPATION TYPE: ICD-10-CM

## 2019-07-15 DIAGNOSIS — C67.9 BLADDER CARCINOMA (HCC): ICD-10-CM

## 2019-07-15 LAB
ALBUMIN SERPL-MCNC: 3.6 GM/DL (ref 3.4–5)
ALP BLD-CCNC: 105 IU/L (ref 40–129)
ALT SERPL-CCNC: 5 U/L (ref 10–40)
ANION GAP SERPL CALCULATED.3IONS-SCNC: 13 MMOL/L (ref 4–16)
AST SERPL-CCNC: 14 IU/L (ref 15–37)
BASOPHILS ABSOLUTE: 0 K/CU MM
BASOPHILS RELATIVE PERCENT: 0.2 % (ref 0–1)
BILIRUB SERPL-MCNC: 1.1 MG/DL (ref 0–1)
BILIRUBIN, POC: ABNORMAL
BLOOD URINE, POC: ABNORMAL
BUN BLDV-MCNC: 12 MG/DL (ref 6–23)
CALCIUM SERPL-MCNC: 9.6 MG/DL (ref 8.3–10.6)
CHLORIDE BLD-SCNC: 93 MMOL/L (ref 99–110)
CLARITY, POC: ABNORMAL
CO2: 24 MMOL/L (ref 21–32)
COLOR, POC: ABNORMAL
CREAT SERPL-MCNC: 1.2 MG/DL (ref 0.9–1.3)
DIFFERENTIAL TYPE: ABNORMAL
EOSINOPHILS ABSOLUTE: 0 K/CU MM
EOSINOPHILS RELATIVE PERCENT: 0 % (ref 0–3)
GFR AFRICAN AMERICAN: >60 ML/MIN/1.73M2
GFR NON-AFRICAN AMERICAN: 59 ML/MIN/1.73M2
GLUCOSE BLD-MCNC: 123 MG/DL (ref 70–99)
GLUCOSE URINE, POC: ABNORMAL
HCT VFR BLD CALC: 43.9 % (ref 42–52)
HEMOGLOBIN: 14.6 GM/DL (ref 13.5–18)
IMMATURE NEUTROPHIL %: 1.1 % (ref 0–0.43)
KETONES, POC: ABNORMAL
LEUKOCYTE EST, POC: ABNORMAL
LYMPHOCYTES ABSOLUTE: 1.3 K/CU MM
LYMPHOCYTES RELATIVE PERCENT: 6.6 % (ref 24–44)
MCH RBC QN AUTO: 31.1 PG (ref 27–31)
MCHC RBC AUTO-ENTMCNC: 33.3 % (ref 32–36)
MCV RBC AUTO: 93.4 FL (ref 78–100)
MONOCYTES ABSOLUTE: 1.9 K/CU MM
MONOCYTES RELATIVE PERCENT: 9.6 % (ref 0–4)
NITRITE, POC: ABNORMAL
PDW BLD-RTO: 13.5 % (ref 11.7–14.9)
PH, POC: 5.5
PLATELET # BLD: 173 K/CU MM (ref 140–440)
PMV BLD AUTO: 9.2 FL (ref 7.5–11.1)
POTASSIUM SERPL-SCNC: 4.3 MMOL/L (ref 3.5–5.1)
PROTEIN, POC: 100
RBC # BLD: 4.7 M/CU MM (ref 4.6–6.2)
SEGMENTED NEUTROPHILS ABSOLUTE COUNT: 16.4 K/CU MM
SEGMENTED NEUTROPHILS RELATIVE PERCENT: 82.5 % (ref 36–66)
SODIUM BLD-SCNC: 130 MMOL/L (ref 135–145)
SPECIFIC GRAVITY, POC: 1.02
TOTAL IMMATURE NEUTOROPHIL: 0.21 K/CU MM
TOTAL PROTEIN: 7.1 GM/DL (ref 6.4–8.2)
UROBILINOGEN, POC: 0.2
WBC # BLD: 19.9 K/CU MM (ref 4–10.5)

## 2019-07-15 PROCEDURE — 99213 OFFICE O/P EST LOW 20 MIN: CPT | Performed by: INTERNAL MEDICINE

## 2019-07-15 PROCEDURE — 74176 CT ABD & PELVIS W/O CONTRAST: CPT

## 2019-07-15 PROCEDURE — 80053 COMPREHEN METABOLIC PANEL: CPT

## 2019-07-15 PROCEDURE — 85025 COMPLETE CBC W/AUTO DIFF WBC: CPT

## 2019-07-15 PROCEDURE — 81002 URINALYSIS NONAUTO W/O SCOPE: CPT | Performed by: INTERNAL MEDICINE

## 2019-07-15 PROCEDURE — 36415 COLL VENOUS BLD VENIPUNCTURE: CPT

## 2019-07-15 PROCEDURE — 87086 URINE CULTURE/COLONY COUNT: CPT

## 2019-07-15 RX ORDER — AMOXICILLIN AND CLAVULANATE POTASSIUM 875; 125 MG/1; MG/1
1 TABLET, FILM COATED ORAL 2 TIMES DAILY
Qty: 20 TABLET | Refills: 0 | Status: SHIPPED | OUTPATIENT
Start: 2019-07-15 | End: 2019-07-17 | Stop reason: ALTCHOICE

## 2019-07-17 ENCOUNTER — HOSPITAL ENCOUNTER (EMERGENCY)
Age: 74
Discharge: HOME | End: 2019-07-17
Attending: EMERGENCY MEDICINE
Payer: COMMERCIAL

## 2019-07-17 ENCOUNTER — TELEPHONE (OUTPATIENT)
Dept: INTERNAL MEDICINE CLINIC | Age: 74
End: 2019-07-17

## 2019-07-17 VITALS
OXYGEN SATURATION: 99 % | BODY MASS INDEX: 23.7 KG/M2 | TEMPERATURE: 98.4 F | HEART RATE: 80 BPM | DIASTOLIC BLOOD PRESSURE: 69 MMHG | RESPIRATION RATE: 16 BRPM | HEIGHT: 69 IN | SYSTOLIC BLOOD PRESSURE: 149 MMHG | WEIGHT: 160 LBS

## 2019-07-17 DIAGNOSIS — R11.0 NAUSEA: Primary | ICD-10-CM

## 2019-07-17 DIAGNOSIS — R14.0 ABDOMINAL BLOATING: ICD-10-CM

## 2019-07-17 DIAGNOSIS — T50.905A MEDICATION REACTION, INITIAL ENCOUNTER: ICD-10-CM

## 2019-07-17 DIAGNOSIS — N40.0 PROSTATIC HYPERPLASIA: ICD-10-CM

## 2019-07-17 LAB
CULTURE: NORMAL
Lab: NORMAL
SPECIMEN: NORMAL

## 2019-07-17 PROCEDURE — 99283 EMERGENCY DEPT VISIT LOW MDM: CPT

## 2019-07-17 PROCEDURE — 6370000000 HC RX 637 (ALT 250 FOR IP): Performed by: EMERGENCY MEDICINE

## 2019-07-17 RX ORDER — ONDANSETRON 4 MG/1
4 TABLET, ORALLY DISINTEGRATING ORAL ONCE
Status: COMPLETED | OUTPATIENT
Start: 2019-07-17 | End: 2019-07-17

## 2019-07-17 RX ORDER — ONDANSETRON 4 MG/1
4 TABLET, ORALLY DISINTEGRATING ORAL EVERY 6 HOURS PRN
Qty: 10 TABLET | Refills: 0 | Status: SHIPPED | OUTPATIENT
Start: 2019-07-17 | End: 2019-08-08 | Stop reason: ALTCHOICE

## 2019-07-17 RX ORDER — CIPROFLOXACIN 500 MG/1
500 TABLET, FILM COATED ORAL 2 TIMES DAILY
Qty: 20 TABLET | Refills: 0 | Status: SHIPPED | OUTPATIENT
Start: 2019-07-17 | End: 2019-07-27

## 2019-07-17 RX ADMIN — ONDANSETRON 4 MG: 4 TABLET, ORALLY DISINTEGRATING ORAL at 17:11

## 2019-07-29 ENCOUNTER — OFFICE VISIT (OUTPATIENT)
Dept: INTERNAL MEDICINE CLINIC | Age: 74
End: 2019-07-29
Payer: COMMERCIAL

## 2019-07-29 VITALS
SYSTOLIC BLOOD PRESSURE: 136 MMHG | BODY MASS INDEX: 24.04 KG/M2 | HEART RATE: 80 BPM | RESPIRATION RATE: 16 BRPM | WEIGHT: 162.8 LBS | OXYGEN SATURATION: 98 % | TEMPERATURE: 99 F | DIASTOLIC BLOOD PRESSURE: 70 MMHG

## 2019-07-29 DIAGNOSIS — N39.0 URINARY TRACT INFECTION WITHOUT HEMATURIA, SITE UNSPECIFIED: Primary | ICD-10-CM

## 2019-07-29 DIAGNOSIS — E87.1 HYPONATREMIA: ICD-10-CM

## 2019-07-29 DIAGNOSIS — C67.9 BLADDER CARCINOMA (HCC): ICD-10-CM

## 2019-07-29 LAB
ANION GAP SERPL CALCULATED.3IONS-SCNC: 17 MMOL/L (ref 3–16)
BASOPHILS ABSOLUTE: 0.1 K/UL (ref 0–0.2)
BASOPHILS RELATIVE PERCENT: 1 %
BUN BLDV-MCNC: 13 MG/DL (ref 7–20)
CALCIUM SERPL-MCNC: 10.2 MG/DL (ref 8.3–10.6)
CHLORIDE BLD-SCNC: 95 MMOL/L (ref 99–110)
CO2: 25 MMOL/L (ref 21–32)
CREAT SERPL-MCNC: 1 MG/DL (ref 0.8–1.3)
EOSINOPHILS ABSOLUTE: 0.1 K/UL (ref 0–0.6)
EOSINOPHILS RELATIVE PERCENT: 1.3 %
GFR AFRICAN AMERICAN: >60
GFR NON-AFRICAN AMERICAN: >60
GLUCOSE BLD-MCNC: 94 MG/DL (ref 70–99)
HCT VFR BLD CALC: 43.2 % (ref 40.5–52.5)
HEMOGLOBIN: 14.6 G/DL (ref 13.5–17.5)
LYMPHOCYTES ABSOLUTE: 1.9 K/UL (ref 1–5.1)
LYMPHOCYTES RELATIVE PERCENT: 22.6 %
MCH RBC QN AUTO: 31.9 PG (ref 26–34)
MCHC RBC AUTO-ENTMCNC: 33.9 G/DL (ref 31–36)
MCV RBC AUTO: 94 FL (ref 80–100)
MONOCYTES ABSOLUTE: 0.7 K/UL (ref 0–1.3)
MONOCYTES RELATIVE PERCENT: 8.4 %
NEUTROPHILS ABSOLUTE: 5.6 K/UL (ref 1.7–7.7)
NEUTROPHILS RELATIVE PERCENT: 66.7 %
PDW BLD-RTO: 14.6 % (ref 12.4–15.4)
PLATELET # BLD: 479 K/UL (ref 135–450)
PMV BLD AUTO: 7.3 FL (ref 5–10.5)
POTASSIUM SERPL-SCNC: 4.6 MMOL/L (ref 3.5–5.1)
RBC # BLD: 4.59 M/UL (ref 4.2–5.9)
SODIUM BLD-SCNC: 137 MMOL/L (ref 136–145)
WBC # BLD: 8.4 K/UL (ref 4–11)

## 2019-07-29 PROCEDURE — 36415 COLL VENOUS BLD VENIPUNCTURE: CPT | Performed by: INTERNAL MEDICINE

## 2019-07-29 PROCEDURE — 99213 OFFICE O/P EST LOW 20 MIN: CPT | Performed by: INTERNAL MEDICINE

## 2019-08-08 ENCOUNTER — OFFICE VISIT (OUTPATIENT)
Dept: INTERNAL MEDICINE CLINIC | Age: 74
End: 2019-08-08
Payer: COMMERCIAL

## 2019-08-08 VITALS
BODY MASS INDEX: 23.48 KG/M2 | TEMPERATURE: 98.2 F | OXYGEN SATURATION: 99 % | SYSTOLIC BLOOD PRESSURE: 128 MMHG | DIASTOLIC BLOOD PRESSURE: 70 MMHG | RESPIRATION RATE: 16 BRPM | WEIGHT: 159 LBS | HEART RATE: 68 BPM

## 2019-08-08 DIAGNOSIS — N40.1 BENIGN PROSTATIC HYPERPLASIA WITH URINARY FREQUENCY: ICD-10-CM

## 2019-08-08 DIAGNOSIS — E03.9 ACQUIRED HYPOTHYROIDISM: ICD-10-CM

## 2019-08-08 DIAGNOSIS — E78.00 PURE HYPERCHOLESTEROLEMIA: ICD-10-CM

## 2019-08-08 DIAGNOSIS — I10 ESSENTIAL HYPERTENSION: Primary | ICD-10-CM

## 2019-08-08 DIAGNOSIS — C67.9 BLADDER CARCINOMA (HCC): ICD-10-CM

## 2019-08-08 DIAGNOSIS — R35.0 BENIGN PROSTATIC HYPERPLASIA WITH URINARY FREQUENCY: ICD-10-CM

## 2019-08-08 PROCEDURE — 99213 OFFICE O/P EST LOW 20 MIN: CPT | Performed by: INTERNAL MEDICINE

## 2019-08-08 ASSESSMENT — ENCOUNTER SYMPTOMS
ABDOMINAL PAIN: 0
ALLERGIC/IMMUNOLOGIC NEGATIVE: 1
RESPIRATORY NEGATIVE: 1
RECTAL PAIN: 0
EYES NEGATIVE: 1
ABDOMINAL DISTENTION: 0

## 2019-10-15 RX ORDER — LEVOTHYROXINE SODIUM 0.05 MG/1
50 TABLET ORAL DAILY
Qty: 90 TABLET | Refills: 1 | Status: SHIPPED | OUTPATIENT
Start: 2019-10-15 | End: 2020-03-18 | Stop reason: SDUPTHER

## 2019-11-21 RX ORDER — ATORVASTATIN CALCIUM 40 MG/1
TABLET, FILM COATED ORAL
Qty: 90 TABLET | Refills: 1 | Status: SHIPPED | OUTPATIENT
Start: 2019-11-21 | End: 2020-03-18 | Stop reason: SDUPTHER

## 2019-11-21 RX ORDER — AMLODIPINE BESYLATE 5 MG/1
5 TABLET ORAL DAILY
Qty: 90 TABLET | Refills: 1 | Status: SHIPPED | OUTPATIENT
Start: 2019-11-21 | End: 2020-03-18 | Stop reason: SDUPTHER

## 2019-12-12 ENCOUNTER — OFFICE VISIT (OUTPATIENT)
Dept: INTERNAL MEDICINE CLINIC | Age: 74
End: 2019-12-12
Payer: COMMERCIAL

## 2019-12-12 VITALS
RESPIRATION RATE: 16 BRPM | SYSTOLIC BLOOD PRESSURE: 122 MMHG | HEART RATE: 68 BPM | DIASTOLIC BLOOD PRESSURE: 70 MMHG | OXYGEN SATURATION: 98 % | WEIGHT: 160.8 LBS | TEMPERATURE: 98.1 F | BODY MASS INDEX: 23.75 KG/M2

## 2019-12-12 DIAGNOSIS — N40.1 BENIGN PROSTATIC HYPERPLASIA WITH URINARY FREQUENCY: ICD-10-CM

## 2019-12-12 DIAGNOSIS — R35.0 BENIGN PROSTATIC HYPERPLASIA WITH URINARY FREQUENCY: ICD-10-CM

## 2019-12-12 DIAGNOSIS — K21.9 GASTROESOPHAGEAL REFLUX DISEASE, ESOPHAGITIS PRESENCE NOT SPECIFIED: ICD-10-CM

## 2019-12-12 DIAGNOSIS — C67.9 BLADDER CARCINOMA (HCC): ICD-10-CM

## 2019-12-12 DIAGNOSIS — E78.00 PURE HYPERCHOLESTEROLEMIA: ICD-10-CM

## 2019-12-12 DIAGNOSIS — E03.9 ACQUIRED HYPOTHYROIDISM: ICD-10-CM

## 2019-12-12 DIAGNOSIS — I10 ESSENTIAL HYPERTENSION: Primary | ICD-10-CM

## 2019-12-12 PROCEDURE — 99213 OFFICE O/P EST LOW 20 MIN: CPT | Performed by: INTERNAL MEDICINE

## 2019-12-12 RX ORDER — OMEPRAZOLE 40 MG/1
40 CAPSULE, DELAYED RELEASE ORAL
Qty: 30 CAPSULE | Refills: 5 | Status: SHIPPED | OUTPATIENT
Start: 2019-12-12 | End: 2020-03-18 | Stop reason: ALTCHOICE

## 2020-01-11 ENCOUNTER — HOSPITAL ENCOUNTER (OUTPATIENT)
Age: 75
Discharge: HOME | End: 2020-01-11
Payer: COMMERCIAL

## 2020-01-11 LAB
ALBUMIN SERPL-MCNC: 4.4 GM/DL (ref 3.4–5)
ALP BLD-CCNC: 117 IU/L (ref 40–129)
ALT SERPL-CCNC: 31 U/L (ref 10–40)
ANION GAP SERPL CALCULATED.3IONS-SCNC: 22 MMOL/L (ref 4–16)
AST SERPL-CCNC: 32 IU/L (ref 15–37)
BILIRUB SERPL-MCNC: 0.6 MG/DL (ref 0–1)
BUN BLDV-MCNC: 15 MG/DL (ref 6–23)
CALCIUM SERPL-MCNC: 9.8 MG/DL (ref 8.3–10.6)
CHLORIDE BLD-SCNC: 102 MMOL/L (ref 99–110)
CHOLESTEROL, FASTING: 116 MG/DL
CO2: 19 MMOL/L (ref 21–32)
CREAT SERPL-MCNC: 1.1 MG/DL (ref 0.9–1.3)
GFR AFRICAN AMERICAN: >60 ML/MIN/1.73M2
GFR NON-AFRICAN AMERICAN: >60 ML/MIN/1.73M2
GLUCOSE BLD-MCNC: 93 MG/DL (ref 70–99)
HDLC SERPL-MCNC: 59 MG/DL
LDL CHOLESTEROL DIRECT: 53 MG/DL
POTASSIUM SERPL-SCNC: 4.4 MMOL/L (ref 3.5–5.1)
PROSTATE SPECIFIC ANTIGEN: 4.33 NG/ML (ref 0–4)
SODIUM BLD-SCNC: 143 MMOL/L (ref 135–145)
TOTAL PROTEIN: 7.3 GM/DL (ref 6.4–8.2)
TRIGLYCERIDE, FASTING: 87 MG/DL
TSH HIGH SENSITIVITY: 2.9 UIU/ML (ref 0.27–4.2)

## 2020-01-11 PROCEDURE — 36415 COLL VENOUS BLD VENIPUNCTURE: CPT

## 2020-01-11 PROCEDURE — 80053 COMPREHEN METABOLIC PANEL: CPT

## 2020-01-11 PROCEDURE — G0103 PSA SCREENING: HCPCS

## 2020-01-11 PROCEDURE — 80061 LIPID PANEL: CPT

## 2020-01-11 PROCEDURE — 84443 ASSAY THYROID STIM HORMONE: CPT

## 2020-03-18 ENCOUNTER — OFFICE VISIT (OUTPATIENT)
Dept: INTERNAL MEDICINE CLINIC | Age: 75
End: 2020-03-18
Payer: COMMERCIAL

## 2020-03-18 VITALS
TEMPERATURE: 97.8 F | WEIGHT: 160.8 LBS | SYSTOLIC BLOOD PRESSURE: 122 MMHG | DIASTOLIC BLOOD PRESSURE: 74 MMHG | HEART RATE: 68 BPM | RESPIRATION RATE: 16 BRPM | BODY MASS INDEX: 23.75 KG/M2 | OXYGEN SATURATION: 98 %

## 2020-03-18 PROCEDURE — 99214 OFFICE O/P EST MOD 30 MIN: CPT | Performed by: INTERNAL MEDICINE

## 2020-03-18 PROCEDURE — G8510 SCR DEP NEG, NO PLAN REQD: HCPCS | Performed by: INTERNAL MEDICINE

## 2020-03-18 RX ORDER — ATORVASTATIN CALCIUM 40 MG/1
TABLET, FILM COATED ORAL
Qty: 90 TABLET | Refills: 1 | Status: SHIPPED | OUTPATIENT
Start: 2020-03-18 | End: 2020-09-16 | Stop reason: SDUPTHER

## 2020-03-18 RX ORDER — OXYBUTYNIN CHLORIDE 5 MG/1
5 TABLET, EXTENDED RELEASE ORAL NIGHTLY
COMMUNITY
Start: 2020-03-11 | End: 2021-10-05

## 2020-03-18 RX ORDER — LEVOTHYROXINE SODIUM 0.05 MG/1
50 TABLET ORAL DAILY
Qty: 90 TABLET | Refills: 1 | Status: SHIPPED | OUTPATIENT
Start: 2020-03-18 | End: 2020-09-16 | Stop reason: SDUPTHER

## 2020-03-18 RX ORDER — AMLODIPINE BESYLATE 5 MG/1
5 TABLET ORAL DAILY
Qty: 90 TABLET | Refills: 1 | Status: SHIPPED | OUTPATIENT
Start: 2020-03-18 | End: 2020-09-16 | Stop reason: SDUPTHER

## 2020-03-18 ASSESSMENT — PATIENT HEALTH QUESTIONNAIRE - PHQ9
2. FEELING DOWN, DEPRESSED OR HOPELESS: 0
SUM OF ALL RESPONSES TO PHQ9 QUESTIONS 1 & 2: 0
SUM OF ALL RESPONSES TO PHQ QUESTIONS 1-9: 0
SUM OF ALL RESPONSES TO PHQ QUESTIONS 1-9: 0
1. LITTLE INTEREST OR PLEASURE IN DOING THINGS: 0

## 2020-03-18 NOTE — PROGRESS NOTES
Lisa Brooke  Patient's  is 1945  Seen in office on 3/18/2020      SUBJECTIVE:  Marzena goetz 69 y. o.year old male presents today   Chief Complaint   Patient presents with    Hypertension    Back Pain     thorasic area after eating dull ache, comes and goes    Medication Refill     wants printed rx    Results     lab     Pt is here for f/u of HTn HLD hypothyroidism  Cystitis symptos resolved  C/o pain in the midthoracic area after eating. Pain is off and on. No diaphoresis. Patient has hypertension. Taking medications No headaches, no chest pain, no palpitations and no dizziness. All the lab results discussed with patient in detail  Taking medications regularly. No side effects noted. Review of Systems  Review of system normal except as in HPI  OBJECTIVE: /74   Pulse 68   Temp 97.8 °F (36.6 °C) (Oral)   Resp 16   Wt 160 lb 12.8 oz (72.9 kg)   SpO2 98%   BMI 23.75 kg/m²     Wt Readings from Last 3 Encounters:   20 160 lb 12.8 oz (72.9 kg)   19 160 lb 12.8 oz (72.9 kg)   19 159 lb (72.1 kg)      GENERAL: - Alert, oriented, pleasant, in no apparent distress. HEENT: - Conjunctiva pink, no scleral icterus. ENT clear. NECK: -Supple. No jugular venous distention noted. No masses felt,  CARDIOVASCULAR: - Normal S1 and S2    PULMONARY: - No respiratory distress. No wheezes or rales. ABDOMEN: - Soft and non-tender,no masses  ororganomegaly. EXTREMITIES: - No cyanosis, clubbing, or significant edema. SKIN: Skin is warm and dry. NEUROLOGICAL: - Cranial nerves II through XII are grossly intact. Back no tenderness on palpation  IMPRESSION:    Encounter Diagnoses   Name Primary?     Esophageal dysphagia Yes    Posterior chest pain     Benign prostatic hyperplasia with urinary frequency     Essential hypertension     Pure hypercholesterolemia     Acquired hypothyroidism     Bladder carcinoma [facility])        ASSESSMENT/PLAN:    Esophageal dysphagia  Patient has esophageal dysphagia going to the back. Will get upper GI and CT scan of the chest    Posterior chest pain  We will get upper GI and CT scan of the chest for pain in the posterior chest when swallowing. Acquired hypothyroidism  Patient has hypothyroidism and is taking Synthroid 50 mcg daily. Patient's TSH was normal in January 2020    Benign prostatic hyperplasia with urinary frequency  Patient has BPH and is on saw palmetto. He also takes Ditropan    Bladder carcinoma [facility])  Patient had bladder cancer and follows with urologist    Essential hypertension  Patient has hypertension that is controlled. Continue amlodipine. Follow low-salt diet    Pure hypercholesterolemia  Patient has hyperlipidemia. Lipid profile was good in January 2020. Continue atorvastatin 40 mg daily         Orders Placed This Encounter   Procedures    FL UGI WITH AIR CONTRAST    CT CHEST WO CONTRAST     RTO in 1 month         Mediations reviewed with the patient. Continue current medications. Appropriate prescriptions are addressed. After visit summeryprovided. Follow up as directed sooner if needed. Questions answered and patient verbalizes understanding. Call for any problems, questions, or concerns.        Allergies   Allergen Reactions    Antihistamines, Chlorpheniramine-Type      Tachycardia      Pneumovax 23 [Pneumococcal Vac Polyvalent]      Body aches, redness at injection site     Current Outpatient Medications   Medication Sig Dispense Refill    oxybutynin (DITROPAN-XL) 5 MG extended release tablet Take 5 mg by mouth nightly      amLODIPine (NORVASC) 5 MG tablet Take 1 tablet by mouth daily 90 tablet 1    atorvastatin (LIPITOR) 40 MG tablet TAKE ONE TABLET BY MOUTH ONCE DAILY 90 tablet 1    levothyroxine (LEVOTHROID) 50 MCG tablet Take 1 tablet by mouth Daily 90 tablet 1    OMEGA-3 FATTY ACIDS PO Take by mouth daily      aspirin 81 MG tablet Take 1 tablet by mouth daily 30 tablet 5    MULTIPLE VITAMIN PO Take 1 tablet by mouth daily.  omeprazole (PRILOSEC) 40 MG delayed release capsule Take 1 capsule by mouth every morning (before breakfast) 30 capsule 5     No current facility-administered medications for this visit. Past Medical History:   Diagnosis Date    Benign prostatic hyperplasia with urinary frequency 2017    Patient is taking Saw Palmetto combinations    Bladder carcinoma ([facility] [address] 2019    Pt had TCC of lateral bladder wall. Had treatment  Dr Ann-Marie Juan seeing patient.  Bladder tumor 10/15/2018    Ultrasound of the kidneys and bladder on 2018 showed a 2 cm tumor in the bladder. Patient is referred to urologist Dr. Ann-Marie Juan.  H/O colonoscopy 3/2012    Dr. Fiona Kimble (hyperlipidemia)     Hypothyroidism      Past Surgical History:   Procedure Laterality Date    APPENDECTOMY  age 24    COLONOSCOPY  2012     DR. Chan     Social History     Tobacco Use    Smoking status: Former Smoker     Packs/day: 1.00     Years: 35.00     Pack years: 35.00     Types: Cigarettes     Last attempt to quit: 1988     Years since quittin.3    Smokeless tobacco: Never Used   Substance Use Topics    Alcohol use: No     Alcohol/week: 0.0 standard drinks       LAB REVIEW:  CBC:   Lab Results   Component Value Date    WBC 8.4 2019    HGB 14.6 2019    HCT 43.2 2019     2019     Lipids:   Lab Results   Component Value Date    CHOL 160 10/29/2016    TRIG 102 10/29/2016    HDL 59 2020    LDLDIRECT 53 2020    TRIGLYCFAST 87 2020     Renal:   Lab Results   Component Value Date    BUN 15 2020    CREATININE 1.1 2020     2020    K 4.4 2020    ALT 31 2020    AST 32 2020    GLUCOSE 93 2020     PT/INR: No results found for: INR  A1C: No results found for: Tracy Andrews MD, 3/18/2020 , 4:40 PM

## 2020-03-29 PROBLEM — R13.19 ESOPHAGEAL DYSPHAGIA: Status: ACTIVE | Noted: 2020-03-29

## 2020-03-29 PROBLEM — R07.89 POSTERIOR CHEST PAIN: Status: ACTIVE | Noted: 2020-03-29

## 2020-04-09 ENCOUNTER — HOSPITAL ENCOUNTER (OUTPATIENT)
Dept: CT IMAGING | Age: 75
Discharge: HOME | End: 2020-04-09
Payer: COMMERCIAL

## 2020-04-09 PROCEDURE — 71250 CT THORAX DX C-: CPT

## 2020-04-13 ENCOUNTER — OFFICE VISIT (OUTPATIENT)
Dept: INTERNAL MEDICINE CLINIC | Age: 75
End: 2020-04-13
Payer: COMMERCIAL

## 2020-04-13 VITALS
OXYGEN SATURATION: 96 % | HEART RATE: 72 BPM | SYSTOLIC BLOOD PRESSURE: 144 MMHG | RESPIRATION RATE: 16 BRPM | DIASTOLIC BLOOD PRESSURE: 78 MMHG | BODY MASS INDEX: 23.83 KG/M2 | WEIGHT: 161.4 LBS | TEMPERATURE: 98.1 F

## 2020-04-13 PROCEDURE — 99213 OFFICE O/P EST LOW 20 MIN: CPT | Performed by: INTERNAL MEDICINE

## 2020-04-13 PROCEDURE — 3288F FALL RISK ASSESSMENT DOCD: CPT | Performed by: INTERNAL MEDICINE

## 2020-04-13 NOTE — PROGRESS NOTES
Leonie Olmos  Patient's  is 1945  Seen in office on 2020      SUBJECTIVE:  Dona Congress sofy 69 y. o.year old male presents today   Chief Complaint   Patient presents with    Results     CT scan 2020    Other     Saw DR. Leyva 2020     Patient is here to discuss the results of the CT scan of the chest.  He had some dysphagia and the pain in the upper back posteriorly. CT scan of the chest showed no acute disease. The results are given below. Patient denies any cough or congestion. Denies any more dysphagia. Upper GI was canceled because of coronavirus restrictions. I told patient to get it done later in the month or so. He has no other complaints  Taking medications regularly. No side effects noted. Patient states he saw Dr. Kayy Larson and had cystoscopy done    Review of Systems    OBJECTIVE: BP (!) 144/78   Pulse 72   Temp 98.1 °F (36.7 °C) (Oral)   Resp 16   Wt 161 lb 6.4 oz (73.2 kg)   SpO2 96%   BMI 23.83 kg/m²     Wt Readings from Last 3 Encounters:   20 161 lb 6.4 oz (73.2 kg)   20 160 lb 12.8 oz (72.9 kg)   19 160 lb 12.8 oz (72.9 kg)      GENERAL: - Alert, oriented, pleasant, in no apparent distress. HEENT: - Conjunctiva pink, no scleral icterus. ENT clear. NECK: -Supple. No jugular venous distention noted. No masses felt,  CARDIOVASCULAR: - Normal S1 and S2    PULMONARY: - No respiratory distress. No wheezes or rales. ABDOMEN: - Soft and non-tender,no masses  ororganomegaly. EXTREMITIES: - No cyanosis, clubbing, or significant edema. SKIN: Skin is warm and dry. NEUROLOGICAL: - Cranial nerves II through XII are grossly intact. IMPRESSION:    Encounter Diagnoses   Name Primary?  Posterior chest pain Yes    Esophageal dysphagia     Bladder carcinoma (HCC)        ASSESSMENT/PLAN:    .  Posterior chest pain has improved. Has some pain in the mid thoracic spine.   .  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  . Dysphagia has improved. He is not taking any Prilosec  . Bladder cancer followed by Dr. David Ray  . Rest of the medical problems stable    Return to office in 2 months    Mediations reviewed with the patient. Continue current medications. Appropriate prescriptions are addressed. After visit summeryprovided. Follow up as directed sooner if needed. Questions answered and patient verbalizes understanding. Call for any problems, questions, or concerns. Allergies   Allergen Reactions    Antihistamines, Chlorpheniramine-Type      Tachycardia      Pneumovax 23 [Pneumococcal Vac Polyvalent]      Body aches, redness at injection site     Current Outpatient Medications   Medication Sig Dispense Refill    oxybutynin (DITROPAN-XL) 5 MG extended release tablet Take 5 mg by mouth nightly      atorvastatin (LIPITOR) 40 MG tablet TAKE ONE TABLET BY MOUTH ONCE DAILY 90 tablet 1    amLODIPine (NORVASC) 5 MG tablet Take 1 tablet by mouth daily 90 tablet 1    levothyroxine (LEVOTHROID) 50 MCG tablet Take 1 tablet by mouth Daily 90 tablet 1    OMEGA-3 FATTY ACIDS PO Take by mouth daily      aspirin 81 MG tablet Take 1 tablet by mouth daily 30 tablet 5    MULTIPLE VITAMIN PO Take 1 tablet by mouth daily. No current facility-administered medications for this visit. Past Medical History:   Diagnosis Date    Benign prostatic hyperplasia with urinary frequency 11/9/2017    Patient is taking Saw Palmetto combinations    Bladder carcinoma ([facility] [address] 2/6/2019    Pt had TCC of lateral bladder wall. Had treatment  Dr David Ray seeing patient.  Bladder tumor 10/15/2018    Ultrasound of the kidneys and bladder on 9/11/2018 showed a 2 cm tumor in the bladder. Patient is referred to urologist Dr. David Ray.     H/O colonoscopy 3/2012    Dr. Janis Valencia (hyperlipidemia)     Hypothyroidism      Past Surgical History:   Procedure Laterality Date    APPENDECTOMY  age 24    COLONOSCOPY  03/28/2012

## 2020-05-27 ENCOUNTER — HOSPITAL ENCOUNTER (OUTPATIENT)
Dept: GENERAL RADIOLOGY | Age: 75
Discharge: HOME | End: 2020-05-27
Payer: COMMERCIAL

## 2020-05-27 PROCEDURE — 74240 X-RAY XM UPR GI TRC 1CNTRST: CPT

## 2020-05-29 ENCOUNTER — TELEPHONE (OUTPATIENT)
Dept: INTERNAL MEDICINE CLINIC | Age: 75
End: 2020-05-29

## 2020-06-15 ENCOUNTER — OFFICE VISIT (OUTPATIENT)
Dept: INTERNAL MEDICINE CLINIC | Age: 75
End: 2020-06-15
Payer: COMMERCIAL

## 2020-06-15 VITALS
OXYGEN SATURATION: 98 % | SYSTOLIC BLOOD PRESSURE: 130 MMHG | RESPIRATION RATE: 16 BRPM | WEIGHT: 159.8 LBS | BODY MASS INDEX: 23.6 KG/M2 | TEMPERATURE: 98.3 F | HEART RATE: 60 BPM | DIASTOLIC BLOOD PRESSURE: 62 MMHG

## 2020-06-15 PROCEDURE — 99214 OFFICE O/P EST MOD 30 MIN: CPT | Performed by: INTERNAL MEDICINE

## 2020-06-15 ASSESSMENT — ENCOUNTER SYMPTOMS
RESPIRATORY NEGATIVE: 1
GASTROINTESTINAL NEGATIVE: 1
EYES NEGATIVE: 1
ALLERGIC/IMMUNOLOGIC NEGATIVE: 1

## 2020-07-23 ENCOUNTER — VIRTUAL VISIT (OUTPATIENT)
Dept: INTERNAL MEDICINE CLINIC | Age: 75
End: 2020-07-23
Payer: COMMERCIAL

## 2020-07-23 VITALS — WEIGHT: 159 LBS | HEIGHT: 69 IN | TEMPERATURE: 97.6 F | BODY MASS INDEX: 23.55 KG/M2

## 2020-07-23 PROCEDURE — G0439 PPPS, SUBSEQ VISIT: HCPCS | Performed by: INTERNAL MEDICINE

## 2020-07-23 ASSESSMENT — PATIENT HEALTH QUESTIONNAIRE - PHQ9
SUM OF ALL RESPONSES TO PHQ QUESTIONS 1-9: 2
SUM OF ALL RESPONSES TO PHQ QUESTIONS 1-9: 2

## 2020-07-23 ASSESSMENT — LIFESTYLE VARIABLES: HOW OFTEN DO YOU HAVE A DRINK CONTAINING ALCOHOL: 0

## 2020-07-23 NOTE — PROGRESS NOTES
Medicare Annual Wellness Visit  Name: Roxy Ferguson Date: 2020   MRN: [id number] Sex: Male   Age: 77 y.o. Ethnicity: Non-/Non    : 1945 Race: Norma Jonas is here for Medicare AWV    Screenings for behavioral, psychosocial and functional/safety risks, and cognitive dysfunction are all negative except as indicated below. These results, as well as other patient data from the [address] form, are documented in Flowsheets linked to this Encounter. Allergies   Allergen Reactions    Antihistamines, Chlorpheniramine-Type      Tachycardia      Pneumovax 23 [Pneumococcal Vac Polyvalent]      Body aches, redness at injection site       Prior to Visit Medications    Medication Sig Taking? Authorizing Provider   oxybutynin (DITROPAN-XL) 5 MG extended release tablet Take 5 mg by mouth nightly Yes Deion Valencia MD   atorvastatin (LIPITOR) 40 MG tablet TAKE ONE TABLET BY MOUTH ONCE DAILY Yes Shaquille Reilly MD   amLODIPine (NORVASC) 5 MG tablet Take 1 tablet by mouth daily Yes Shaquille Reilly MD   levothyroxine (LEVOTHROID) 50 MCG tablet Take 1 tablet by mouth Daily Yes Shaquille Reilly MD   OMEGA-3 FATTY ACIDS PO Take by mouth daily Yes Historical Provider, MD   aspirin 81 MG tablet Take 1 tablet by mouth daily Yes Shaquille Reilly MD   MULTIPLE VITAMIN PO Take 1 tablet by mouth daily. Yes Historical Provider, MD       Past Medical History:   Diagnosis Date    Benign prostatic hyperplasia with urinary frequency 2017    Patient is taking Saw Palmetto combinations    Bladder carcinoma ([facility] [address] 2019    Pt had TCC of lateral bladder wall. Had treatment  Dr Marie Crowley seeing patient.  Bladder tumor 10/15/2018    Ultrasound of the kidneys and bladder on 2018 showed a 2 cm tumor in the bladder. Patient is referred to urologist Dr. Marie Crowley.     H/O colonoscopy 3/2012    Dr. Terrence Olson (hyperlipidemia)     Hypothyroidism        Past Surgical History: past 3 months?: No  Do you eat fewer than 2 meals per day?: No  Have you seen a dentist within the past year?: (!) No  Body mass index is 23.48 kg/m².   Health Habits/Nutrition Interventions:  · Dental exam overdue:  patient encouraged to make appointment with his/her dentist    Hearing/Vision:  No exam data present  Hearing/Vision  Do you or your family notice any trouble with your hearing?: (!) Yes  Do you have difficulty driving, watching TV, or doing any of your daily activities because of your eyesight?: No  Have you had an eye exam within the past year?: (!) No  Hearing/Vision Interventions:  · Hearing concerns:  patient declines any further evaluation/treatment for hearing issues  · Vision concerns:  patient encouraged to make appointment with his/her eye specialist    Personalized Preventive Plan   Current Health Maintenance Status  Immunization History   Administered Date(s) Administered    Influenza Vaccine, unspecified formulation 09/23/2016    Influenza Virus Vaccine 09/18/2015, 09/23/2016    Influenza Whole 09/18/2015    Influenza, High Dose (Fluzone 65 yrs and older) 09/23/2017, 09/22/2018, 09/18/2019    Pneumococcal Conjugate 13-valent (Manuel Nordmann) 10/09/2015    Pneumococcal Polysaccharide ([id number]) 11/09/2017    Tdap (Boostrix, Adacel) 01/05/2012        Health Maintenance   Topic Date Due    Shingles Vaccine (1 of 2) 05/24/1996    Annual Wellness Visit (AWV)  05/29/2019    Colon Cancer Screen FIT/FOBT  08/06/2022 (Originally 7/19/2017)    Flu vaccine (1) 09/01/2020    Lipid screen  01/11/2021    TSH testing  01/11/2021    Potassium monitoring  01/11/2021    Creatinine monitoring  01/11/2021    PSA counseling  01/11/2021    DTaP/Tdap/Td vaccine (2 - Td) 01/05/2022    AAA screen  Completed    Hepatitis C screen  Completed    Hepatitis A vaccine  Aged Out    Hepatitis B vaccine  Aged Out    Hib vaccine  Aged Out    Meningococcal (ACWY) vaccine  Aged Out     Recommendations for Preventive Services Due: see orders and patient instructions/AVS.  . Recommended screening schedule for the next 5-10 years is provided to the patient in written form: see Patient Instructions/AVS.    Ra Sparks LPN, 3/00/9532, performed the documented evaluation under the direct supervision of the attending physician. Tobin Bradley is a 77 y.o. male being evaluated by a Virtual Visit (audio visit) encounter to address concerns as mentioned above. A caregiver was present when appropriate. Due to this being a TeleHealth encounter (During WKQRF-19 public health emergency), evaluation of the following organ systems was limited: Vitals/Constitutional/EENT/Resp/CV/GI//MS/Neuro/Skin/Heme-Lymph-Imm. Pursuant to the emergency declaration under the [address] [place] [address], [address] [place] [address] authority and the [employer] and [employer] Act, this Virtual Visit was conducted with patient's (and/or legal guardian's) consent, to reduce the patient's risk of exposure to COVID-19 and provide necessary medical care. The patient (and/or legal guardian) has also been advised to contact this office for worsening conditions or problems, and seek emergency medical treatment and/or call 911 if deemed necessary. Patient identification was verified at the start of the visit: Yes    Total time spent for this encounter: 15 minutes    Services were provided through audio synchronous discussion virtually to substitute for in-person clinic visit. Patient and provider were located at their individual homes. --Madelaine Cleveland LPN on 7/28/3318 at 2:17 AM    An electronic signature was used to authenticate this note.

## 2020-07-23 NOTE — PATIENT INSTRUCTIONS
Personalized Preventive Plan for Anuj Angela - 7/23/2020  Medicare offers a range of preventive health benefits. Some of the tests and screenings are paid in full while other may be subject to a deductible, co-insurance, and/or copay. Some of these benefits include a comprehensive review of your medical history including lifestyle, illnesses that may run in your family, and various assessments and screenings as appropriate. After reviewing your medical record and screening and assessments performed today your provider may have ordered immunizations, labs, imaging, and/or referrals for you. A list of these orders (if applicable) as well as your Preventive Care list are included within your After Visit Summary for your review. Other Preventive Recommendations:    · A preventive eye exam performed by an eye specialist is recommended every 1-2 years to screen for glaucoma; cataracts, macular degeneration, and other eye disorders. · A preventive dental visit is recommended every 6 months. · Try to get at least 150 minutes of exercise per week or 10,000 steps per day on a pedometer . · Order or download the FREE \"Exercise & Physical Activity: Your Everyday Guide\" from [employer]. Call 8-211.795.6144 or search [employer] online. · You need 7761-7249 mg of calcium and 1415-1791 IU of vitamin D per day. It is possible to meet your calcium requirement with diet alone, but a vitamin D supplement is usually necessary to meet this goal.  · When exposed to the sun, use a sunscreen that protects against both UVA and UVB radiation with an SPF of 30 or greater. Reapply every 2 to 3 hours or after sweating, drying off with a towel, or swimming. · Always wear a seat belt when traveling in a car. Always wear a helmet when riding a bicycle or motorcycle.

## 2020-09-02 ENCOUNTER — HOSPITAL ENCOUNTER (OUTPATIENT)
Age: 75
Discharge: HOME | End: 2020-09-02
Payer: COMMERCIAL

## 2020-09-02 LAB
ALBUMIN SERPL-MCNC: 4.4 GM/DL (ref 3.4–5)
ALP BLD-CCNC: 111 IU/L (ref 40–129)
ALT SERPL-CCNC: 26 U/L (ref 10–40)
ANION GAP SERPL CALCULATED.3IONS-SCNC: 8 MMOL/L (ref 4–16)
AST SERPL-CCNC: 26 IU/L (ref 15–37)
BILIRUB SERPL-MCNC: 0.6 MG/DL (ref 0–1)
BUN BLDV-MCNC: 14 MG/DL (ref 6–23)
CALCIUM SERPL-MCNC: 10.1 MG/DL (ref 8.3–10.6)
CHLORIDE BLD-SCNC: 103 MMOL/L (ref 99–110)
CHOLESTEROL, FASTING: 139 MG/DL
CO2: 31 MMOL/L (ref 21–32)
CREAT SERPL-MCNC: 1.2 MG/DL (ref 0.9–1.3)
GFR AFRICAN AMERICAN: >60 ML/MIN/1.73M2
GFR NON-AFRICAN AMERICAN: 59 ML/MIN/1.73M2
GLUCOSE FASTING: 99 MG/DL (ref 70–99)
HDLC SERPL-MCNC: 61 MG/DL
LDL CHOLESTEROL DIRECT: 64 MG/DL
POTASSIUM SERPL-SCNC: 4.7 MMOL/L (ref 3.5–5.1)
SODIUM BLD-SCNC: 142 MMOL/L (ref 135–145)
TOTAL PROTEIN: 7.1 GM/DL (ref 6.4–8.2)
TRIGLYCERIDE, FASTING: 167 MG/DL
TSH HIGH SENSITIVITY: 3.1 UIU/ML (ref 0.27–4.2)

## 2020-09-02 PROCEDURE — 84443 ASSAY THYROID STIM HORMONE: CPT

## 2020-09-02 PROCEDURE — 80061 LIPID PANEL: CPT

## 2020-09-02 PROCEDURE — 36415 COLL VENOUS BLD VENIPUNCTURE: CPT

## 2020-09-02 PROCEDURE — 80053 COMPREHEN METABOLIC PANEL: CPT

## 2020-09-16 ENCOUNTER — OFFICE VISIT (OUTPATIENT)
Dept: INTERNAL MEDICINE CLINIC | Age: 75
End: 2020-09-16
Payer: COMMERCIAL

## 2020-09-16 VITALS
TEMPERATURE: 98.2 F | HEART RATE: 88 BPM | OXYGEN SATURATION: 98 % | SYSTOLIC BLOOD PRESSURE: 138 MMHG | RESPIRATION RATE: 16 BRPM | DIASTOLIC BLOOD PRESSURE: 80 MMHG | BODY MASS INDEX: 24.25 KG/M2 | WEIGHT: 164.2 LBS

## 2020-09-16 PROCEDURE — 90694 VACC AIIV4 NO PRSRV 0.5ML IM: CPT | Performed by: INTERNAL MEDICINE

## 2020-09-16 PROCEDURE — G0008 ADMIN INFLUENZA VIRUS VAC: HCPCS | Performed by: INTERNAL MEDICINE

## 2020-09-16 PROCEDURE — 99214 OFFICE O/P EST MOD 30 MIN: CPT | Performed by: INTERNAL MEDICINE

## 2020-09-16 RX ORDER — AMLODIPINE BESYLATE 5 MG/1
5 TABLET ORAL DAILY
Qty: 90 TABLET | Refills: 1 | Status: SHIPPED | OUTPATIENT
Start: 2020-09-16 | End: 2021-02-22 | Stop reason: SDUPTHER

## 2020-09-16 RX ORDER — LEVOTHYROXINE SODIUM 0.05 MG/1
50 TABLET ORAL DAILY
Qty: 90 TABLET | Refills: 1 | Status: SHIPPED | OUTPATIENT
Start: 2020-09-16 | End: 2021-02-22 | Stop reason: SDUPTHER

## 2020-09-16 RX ORDER — PANTOPRAZOLE SODIUM 20 MG/1
20 TABLET, DELAYED RELEASE ORAL DAILY
Qty: 90 TABLET | Refills: 0 | Status: SHIPPED | OUTPATIENT
Start: 2020-09-16 | End: 2020-12-08 | Stop reason: SDUPTHER

## 2020-09-16 RX ORDER — ATORVASTATIN CALCIUM 40 MG/1
TABLET, FILM COATED ORAL
Qty: 90 TABLET | Refills: 1 | Status: SHIPPED | OUTPATIENT
Start: 2020-09-16 | End: 2021-02-22 | Stop reason: SDUPTHER

## 2020-09-16 ASSESSMENT — ENCOUNTER SYMPTOMS
EYES NEGATIVE: 1
ALLERGIC/IMMUNOLOGIC NEGATIVE: 1
WHEEZING: 0
SHORTNESS OF BREATH: 0
RESPIRATORY NEGATIVE: 1
COUGH: 0
GASTROINTESTINAL NEGATIVE: 1

## 2020-09-16 NOTE — PROGRESS NOTES
Kosta Schwartz  Patient's  is 1945  Seen in office on 2020      SUBJECTIVE:  Andrea Bragg sofy 77 y. o.year old male presents today   Chief Complaint   Patient presents with    3 Month Follow-Up     HTN    Results     lab    Medication Refill     Pt is here for f/u of HTN HLD bladder ca abd hypothyroidism  Patient has hypertension. Taking medications No headaches, no chest pain, no palpitations and no dizziness. Patient has hyperlipidemia. Taking medications. No abdominal pain, no nausea or vomiting. No myalgias. Pt has hypothyroidism : taking med  Feels bloated some times . No abdominal pain. Lab results reviewed. Taking medications regularly. No side effects noted. Review of Systems   Constitutional: Negative. HENT: Negative. Eyes: Negative. Respiratory: Negative. Negative for cough, shortness of breath and wheezing. Cardiovascular: Negative. Negative for chest pain and leg swelling. Gastrointestinal: Negative. Endocrine: Negative. Genitourinary: Negative. Musculoskeletal: Negative. Skin: Negative. Allergic/Immunologic: Negative. Neurological: Negative. Hematological: Negative. Psychiatric/Behavioral: Negative. OBJECTIVE: /80   Pulse 88   Temp 98.2 °F (36.8 °C)   Resp 16   Wt 164 lb 3.2 oz (74.5 kg)   SpO2 98%   BMI 24.25 kg/m²     Wt Readings from Last 3 Encounters:   20 164 lb 3.2 oz (74.5 kg)   20 159 lb (72.1 kg)   06/15/20 159 lb 12.8 oz (72.5 kg)      GENERAL: - Alert, oriented, pleasant, in no apparent distress. HEENT: - Conjunctiva pink, no scleral icterus. ENT clear. NECK: -Supple. No jugular venous distention noted. No masses felt,  CARDIOVASCULAR: - Normal S1 and S2    PULMONARY: - No respiratory distress. No wheezes or rales. ABDOMEN: - Soft and non-tender,no masses  ororganomegaly. EXTREMITIES: - No cyanosis, clubbing, or significant edema. SKIN: Skin is warm and dry.    NEUROLOGICAL: - Cranial nerves II through XII are grossly intact. IMPRESSION:    Encounter Diagnoses   Name Primary?  Essential hypertension     Acquired hypothyroidism     Pure hypercholesterolemia     Benign prostatic hyperplasia with urinary frequency     Bladder carcinoma (HCC)     Esophageal dysphagia        ASSESSMENT/PLAN:    Essential hypertension  Patient is taking amlodipine  Hypertension in control. Follow low salt diet. Continue current treatment. CMP is normal.     Acquired hypothyroidism  Patient is on levothyroxine 50 mcg daily  Patient is stable. Continue current treatment. TSH : normal.    Pure hypercholesterolemia  Patient is on atorvastatin 40 mg daily  Hyperlipidemia is stable. Follow low cholesterol diet. Continue current treatment. Lipid good. Benign prostatic hyperplasia with urinary frequency  Symptoms are better. Oxybutynin: it is not helping. Bladder carcinoma ([facility] [address]  Pt had TCC of lateral bladder wall Dx 12/18. Had treatment   Dr Laura Fitzgerald seeing patient. Having cystoscopy q 3-6 month    Esophageal dysphagia  Patient has esophageal dysphagia going to the back. ct chest is normal. UGI mild HH and dysmotility . trial of protonix 40 mg daily      PSA was 4.3 : in 1/2020. Dr Laura Fitzgerald saw patient and repeating it 10/2020. Flu vaccine given. Return to office in 3 months. Mediations reviewed with the patient. Continue current medications. Appropriate prescriptions are addressed. After visit summeryprovided. Follow up as directed sooner if needed. Questions answered and patient verbalizes understanding. Call for any problems, questions, or concerns.        Allergies   Allergen Reactions    Antihistamines, Chlorpheniramine-Type      Tachycardia      Pneumovax 23 [Pneumococcal Vac Polyvalent]      Body aches, redness at injection site     Current Outpatient Medications   Medication Sig Dispense Refill    oxybutynin (DITROPAN-XL) 5 MG extended release tablet Take 5 mg by mouth nightly      atorvastatin (LIPITOR) 40 MG tablet TAKE ONE TABLET BY MOUTH ONCE DAILY 90 tablet 1    amLODIPine (NORVASC) 5 MG tablet Take 1 tablet by mouth daily 90 tablet 1    levothyroxine (LEVOTHROID) 50 MCG tablet Take 1 tablet by mouth Daily 90 tablet 1    OMEGA-3 FATTY ACIDS PO Take by mouth daily      aspirin 81 MG tablet Take 1 tablet by mouth daily 30 tablet 5    MULTIPLE VITAMIN PO Take 1 tablet by mouth daily. No current facility-administered medications for this visit. Past Medical History:   Diagnosis Date    Benign prostatic hyperplasia with urinary frequency 2017    Patient is taking Saw Palmetto combinations    Bladder carcinoma ([facility] [address] 2019    Pt had TCC of lateral bladder wall. Had treatment  Dr Nelson Walker seeing patient.  Bladder tumor 10/15/2018    Ultrasound of the kidneys and bladder on 2018 showed a 2 cm tumor in the bladder. Patient is referred to urologist Dr. Nelson Walker.  H/O colonoscopy 3/2012    Dr. Shayy Wei (hyperlipidemia)     Hypothyroidism      Past Surgical History:   Procedure Laterality Date    APPENDECTOMY  age 24    COLONOSCOPY  2012     DR. Chan     Social History     Tobacco Use    Smoking status: Former Smoker     Packs/day: 1.00     Years: 35.00     Pack years: 35.00     Types: Cigarettes     Last attempt to quit: 1988     Years since quittin.8    Smokeless tobacco: Never Used   Substance Use Topics    Alcohol use: No     Alcohol/week: 0.0 standard drinks       LAB REVIEW:  CBC:   Lab Results   Component Value Date    WBC 8.4 2019    HGB 14.6 2019    HCT 43.2 2019     2019     Lipids:   Lab Results   Component Value Date    CHOL 160 10/29/2016    TRIG 102 10/29/2016    HDL 61 2020    LDLDIRECT 64 2020    TRIGLYCFAST 167 (H) 2020     Renal:   Lab Results   Component Value Date    BUN 14 2020    CREATININE 1.2 2020     2020    K 4.7 2020    ALT 26 09/02/2020    AST 26 09/02/2020    GLUCOSE 93 01/11/2020     PT/INR: No results found for: INR  A1C: No results found for: Jack Cuba MD, 9/16/2020 , 11:54 AM

## 2020-09-16 NOTE — ASSESSMENT & PLAN NOTE
Patient is on levothyroxine 50 mcg daily  Patient is stable. Continue current treatment.   TSH : normal.

## 2020-09-16 NOTE — ASSESSMENT & PLAN NOTE
Patient is on atorvastatin 40 mg daily  Hyperlipidemia is stable. Follow low cholesterol diet. Continue current treatment. Lipid good.

## 2020-09-16 NOTE — ASSESSMENT & PLAN NOTE
Patient is taking amlodipine  Hypertension in control. Follow low salt diet. Continue current treatment.   CMP is normal.

## 2020-09-16 NOTE — ASSESSMENT & PLAN NOTE
Patient has esophageal dysphagia going to the back. ct chest is normal. UGI mild HH and dysmotility . trial of protonix 40 mg daily

## 2020-09-16 NOTE — ASSESSMENT & PLAN NOTE
Pt had TCC of lateral bladder wall Dx 12/18. Had treatment   Dr Karen Simon seeing patient.  Having cystoscopy q 3-6 month

## 2020-10-06 ENCOUNTER — HOSPITAL ENCOUNTER (OUTPATIENT)
Age: 75
Discharge: HOME | End: 2020-10-06
Payer: COMMERCIAL

## 2020-10-06 LAB — PROSTATE SPECIFIC ANTIGEN: 3.82 NG/ML (ref 0–4)

## 2020-10-06 PROCEDURE — 36415 COLL VENOUS BLD VENIPUNCTURE: CPT

## 2020-10-06 PROCEDURE — G0103 PSA SCREENING: HCPCS

## 2020-11-11 ENCOUNTER — OFFICE VISIT (OUTPATIENT)
Dept: INTERNAL MEDICINE CLINIC | Age: 75
End: 2020-11-11
Payer: COMMERCIAL

## 2020-11-11 ENCOUNTER — TELEPHONE (OUTPATIENT)
Dept: INTERNAL MEDICINE CLINIC | Age: 75
End: 2020-11-11

## 2020-11-11 VITALS — WEIGHT: 168.2 LBS | OXYGEN SATURATION: 98 % | BODY MASS INDEX: 24.84 KG/M2 | TEMPERATURE: 98.1 F

## 2020-11-11 PROCEDURE — 99214 OFFICE O/P EST MOD 30 MIN: CPT | Performed by: INTERNAL MEDICINE

## 2020-11-11 NOTE — PROGRESS NOTES
Gisela Brennan  Patient's  is 1945  Seen in office on 2020      SUBJECTIVE:  Miguelito goetz 77 y. o.year old male presents today   Chief Complaint   Patient presents with    Hypertension    Abdominal Pain     lower left side    Other     saw dr Arabella Sales in Oct, has to return in April     Patient is here for follow-up of hypertension, hyperlipidemia, esophageal dysmotility and bloated feeling. Patient states he is taking Metamucil and that helps his bloated feeling. He had colonoscopy in the past in   Patient denies any chest pain. No shortness of breath. No cough or sputum production. No abdominal pain. No nausea or vomiting. Patient sees Dr. Arabella Sales for bladder cancer. Patient has hyperlipidemia. Taking medications. No abdominal pain, no nausea or vomiting. No myalgias. Of the lower abdomen especially on the left side. His bowels are moving well. He had last colonoscopy in . Taking medications regularly. No side effects noted. Review of Systems  Review of system normal except as in HPI  OBJECTIVE: Temp 98.1 °F (36.7 °C) (Oral)   Wt 168 lb 3.2 oz (76.3 kg)   SpO2 98%   BMI 24.84 kg/m²     Wt Readings from Last 3 Encounters:   20 168 lb 3.2 oz (76.3 kg)   20 164 lb 3.2 oz (74.5 kg)   20 159 lb (72.1 kg)      Patient was seen taking COVID-19 precautions. Face mask, gloves and eyes protectors were used. Patient also wore facemask. GENERAL: - Alert, oriented, pleasant, in no apparent distress. HEENT: - Conjunctiva pink, no scleral icterus. ENT clear. NECK: -Supple. No jugular venous distention noted. No masses felt,  CARDIOVASCULAR: - Normal S1 and S2    PULMONARY: - No respiratory distress. No wheezes or rales. ABDOMEN: - Soft and non-tender,no masses  ororganomegaly. EXTREMITIES: - No cyanosis, clubbing, or significant edema. SKIN: Skin is warm and dry. NEUROLOGICAL: - Cranial nerves II through XII are grossly intact. tablet Take 1 tablet by mouth daily 30 tablet 5    MULTIPLE VITAMIN PO Take 1 tablet by mouth daily.  pantoprazole (PROTONIX) 20 MG tablet Take 1 tablet by mouth daily 90 tablet 0     No current facility-administered medications for this visit. Past Medical History:   Diagnosis Date    Benign prostatic hyperplasia with urinary frequency 2017    Patient is taking Saw Palmetto combinations    Bladder carcinoma ([address] 2019    Pt had TCC of lateral bladder wall. Had treatment  Dr Jelly Pardo seeing patient.  Bladder tumor 10/15/2018    Ultrasound of the kidneys and bladder on 2018 showed a 2 cm tumor in the bladder. Patient is referred to urologist Dr. Jelly Pardo.  H/O colonoscopy 3/2012    Dr. Aron Galvan (hyperlipidemia)     Hypothyroidism      Past Surgical History:   Procedure Laterality Date    APPENDECTOMY  age 24    COLONOSCOPY  2012     DR. Chan     Social History     Tobacco Use    Smoking status: Former Smoker     Packs/day: 1.00     Years: 35.00     Pack years: 35.00     Types: Cigarettes     Last attempt to quit: 1988     Years since quittin.9    Smokeless tobacco: Never Used   Substance Use Topics    Alcohol use: No     Alcohol/week: 0.0 standard drinks       LAB REVIEW:  CBC:   Lab Results   Component Value Date    WBC 8.4 2019    HGB 14.6 2019    HCT 43.2 2019     2019     Lipids:   Lab Results   Component Value Date    CHOL 160 10/29/2016    TRIG 102 10/29/2016    HDL 61 2020    LDLDIRECT 64 2020    TRIGLYCFAST 167 (H) 2020     Renal:   Lab Results   Component Value Date    BUN 14 2020    CREATININE 1.2 2020     2020    K 4.7 2020    ALT 26 2020    AST 26 2020    GLUCOSE 93 2020     PT/INR: No results found for: INR  A1C: No results found for: Radha Sanchez MD, 2020 , 1:55 PM

## 2020-11-18 ENCOUNTER — HOSPITAL ENCOUNTER (OUTPATIENT)
Dept: CT IMAGING | Age: 75
Discharge: HOME | End: 2020-11-18
Payer: COMMERCIAL

## 2020-11-18 PROCEDURE — 74176 CT ABD & PELVIS W/O CONTRAST: CPT

## 2020-11-24 ENCOUNTER — TELEPHONE (OUTPATIENT)
Dept: INTERNAL MEDICINE CLINIC | Age: 75
End: 2020-11-24

## 2020-11-24 PROBLEM — R19.5 POSITIVE COLORECTAL CANCER SCREENING USING COLOGUARD TEST: Status: ACTIVE | Noted: 2020-11-24

## 2020-11-24 NOTE — TELEPHONE ENCOUNTER
Inform patient he needs colonoscopy as cologuard test is positive. He can go to GI of his choice.  Will refer

## 2020-12-01 ENCOUNTER — TELEPHONE (OUTPATIENT)
Dept: INTERNAL MEDICINE CLINIC | Age: 75
End: 2020-12-01

## 2020-12-03 ENCOUNTER — TELEPHONE (OUTPATIENT)
Dept: INTERNAL MEDICINE CLINIC | Age: 75
End: 2020-12-03

## 2020-12-08 ENCOUNTER — OFFICE VISIT (OUTPATIENT)
Dept: INTERNAL MEDICINE CLINIC | Age: 75
End: 2020-12-08
Payer: COMMERCIAL

## 2020-12-08 VITALS
HEART RATE: 68 BPM | RESPIRATION RATE: 16 BRPM | SYSTOLIC BLOOD PRESSURE: 138 MMHG | OXYGEN SATURATION: 98 % | TEMPERATURE: 98.3 F | BODY MASS INDEX: 24.9 KG/M2 | WEIGHT: 168.6 LBS | DIASTOLIC BLOOD PRESSURE: 76 MMHG

## 2020-12-08 PROBLEM — R07.89 POSTERIOR CHEST PAIN: Status: RESOLVED | Noted: 2020-03-29 | Resolved: 2020-12-08

## 2020-12-08 PROCEDURE — 99214 OFFICE O/P EST MOD 30 MIN: CPT | Performed by: INTERNAL MEDICINE

## 2020-12-08 RX ORDER — PANTOPRAZOLE SODIUM 20 MG/1
20 TABLET, DELAYED RELEASE ORAL DAILY
Qty: 90 TABLET | Refills: 0 | Status: SHIPPED | OUTPATIENT
Start: 2020-12-08 | End: 2021-03-08 | Stop reason: SDUPTHER

## 2020-12-08 ASSESSMENT — ENCOUNTER SYMPTOMS
RESPIRATORY NEGATIVE: 1
ALLERGIC/IMMUNOLOGIC NEGATIVE: 1
EYES NEGATIVE: 1
GASTROINTESTINAL NEGATIVE: 1

## 2020-12-08 NOTE — ASSESSMENT & PLAN NOTE
Patient has esophageal dysphagia going to the back. ct chest is normal. UGI mild HH and dysmotility . trial of protonix 40 mg daily  Continue the same

## 2020-12-08 NOTE — ASSESSMENT & PLAN NOTE
Pt had TCC of lateral bladder wall Dx 12/18. Had treatment   Dr Saloni Katz seeing patient.  Having cystoscopy q 3-6 month

## 2021-02-16 ENCOUNTER — TELEPHONE (OUTPATIENT)
Dept: INTERNAL MEDICINE CLINIC | Age: 76
End: 2021-02-16

## 2021-02-16 ENCOUNTER — HOSPITAL ENCOUNTER (OUTPATIENT)
Age: 76
Discharge: HOME | End: 2021-02-16
Payer: COMMERCIAL

## 2021-02-16 DIAGNOSIS — R39.15 URINARY URGENCY: Primary | ICD-10-CM

## 2021-02-16 PROCEDURE — 87086 URINE CULTURE/COLONY COUNT: CPT

## 2021-02-16 NOTE — TELEPHONE ENCOUNTER
Patient complains of burning while urinating and some urgency. I placed a poc urinalysis and culture. I asked that patient to go to the hospital lab and have this completed. Caitlin Billingsley

## 2021-02-17 LAB
CULTURE: NORMAL
Lab: NORMAL
SPECIMEN: NORMAL

## 2021-02-18 ENCOUNTER — TELEPHONE (OUTPATIENT)
Dept: INTERNAL MEDICINE CLINIC | Age: 76
End: 2021-02-18

## 2021-02-18 NOTE — TELEPHONE ENCOUNTER
Patient called asking for Urine culture results negative, no growth. Still has some burning with urination, not bad but knows its there. Wanting to know if he needs any antibiotics, \"feels like an infection. \"

## 2021-02-18 NOTE — TELEPHONE ENCOUNTER
Spoke with Dr. Bradly Dixon and inst patient no antibiotics, try AZO for a couple of days and can drink cranberry juice and increase water intake. If symptoms not any better needs to be seen. Verbal understanding returned by patient.

## 2021-02-22 ENCOUNTER — OFFICE VISIT (OUTPATIENT)
Dept: INTERNAL MEDICINE CLINIC | Age: 76
End: 2021-02-22
Payer: COMMERCIAL

## 2021-02-22 VITALS
RESPIRATION RATE: 16 BRPM | BODY MASS INDEX: 25.58 KG/M2 | OXYGEN SATURATION: 96 % | HEART RATE: 72 BPM | DIASTOLIC BLOOD PRESSURE: 90 MMHG | SYSTOLIC BLOOD PRESSURE: 160 MMHG | WEIGHT: 173.2 LBS | TEMPERATURE: 98.2 F

## 2021-02-22 DIAGNOSIS — N40.1 BENIGN PROSTATIC HYPERPLASIA WITH URINARY FREQUENCY: ICD-10-CM

## 2021-02-22 DIAGNOSIS — E03.9 ACQUIRED HYPOTHYROIDISM: ICD-10-CM

## 2021-02-22 DIAGNOSIS — R14.0 BLOATED ABDOMEN: ICD-10-CM

## 2021-02-22 DIAGNOSIS — C67.9 BLADDER CARCINOMA (HCC): ICD-10-CM

## 2021-02-22 DIAGNOSIS — R35.0 BENIGN PROSTATIC HYPERPLASIA WITH URINARY FREQUENCY: ICD-10-CM

## 2021-02-22 DIAGNOSIS — R30.0 DYSURIA: Primary | ICD-10-CM

## 2021-02-22 DIAGNOSIS — I10 ESSENTIAL HYPERTENSION: ICD-10-CM

## 2021-02-22 LAB
BILIRUBIN, POC: NEGATIVE
BLOOD URINE, POC: ABNORMAL
CLARITY, POC: CLEAR
COLOR, POC: YELLOW
GLUCOSE URINE, POC: NEGATIVE
KETONES, POC: NEGATIVE
LEUKOCYTE EST, POC: NEGATIVE
NITRITE, POC: NEGATIVE
PH, POC: 7.5
PROTEIN, POC: NEGATIVE
SPECIFIC GRAVITY, POC: 1.01
UROBILINOGEN, POC: 0.2

## 2021-02-22 PROCEDURE — 99213 OFFICE O/P EST LOW 20 MIN: CPT | Performed by: INTERNAL MEDICINE

## 2021-02-22 PROCEDURE — 81002 URINALYSIS NONAUTO W/O SCOPE: CPT | Performed by: INTERNAL MEDICINE

## 2021-02-22 RX ORDER — AMLODIPINE BESYLATE 5 MG/1
5 TABLET ORAL DAILY
Qty: 90 TABLET | Refills: 1 | Status: SHIPPED | OUTPATIENT
Start: 2021-02-22 | End: 2021-07-09 | Stop reason: SDUPTHER

## 2021-02-22 RX ORDER — LEVOTHYROXINE SODIUM 0.05 MG/1
50 TABLET ORAL DAILY
Qty: 90 TABLET | Refills: 1 | Status: SHIPPED | OUTPATIENT
Start: 2021-02-22 | End: 2021-07-09 | Stop reason: SDUPTHER

## 2021-02-22 RX ORDER — ATORVASTATIN CALCIUM 40 MG/1
TABLET, FILM COATED ORAL
Qty: 90 TABLET | Refills: 1 | Status: SHIPPED | OUTPATIENT
Start: 2021-02-22 | End: 2021-07-09 | Stop reason: SDUPTHER

## 2021-02-22 ASSESSMENT — PATIENT HEALTH QUESTIONNAIRE - PHQ9
SUM OF ALL RESPONSES TO PHQ QUESTIONS 1-9: 0
SUM OF ALL RESPONSES TO PHQ QUESTIONS 1-9: 0
SUM OF ALL RESPONSES TO PHQ9 QUESTIONS 1 & 2: 0

## 2021-02-22 NOTE — PROGRESS NOTES
Lorie Hennessy  Patient's  is 1945  Seen in office on 2021      SUBJECTIVE:  Clayton Quale sofy 77 y. o.year old male presents today   Chief Complaint   Patient presents with    Dysuria     burning sensation    Bloated    Medication Refill     wants printed rx's    Discuss Medications     pantoprazole not helping       Pt c/o dysuria and some times burning on micturation  Urine culture is negative  todays UA is normal.  Pt states he has discomfort in the suprapubic region. Pt c/o bloated feeling   CT abdomen nothing acute few months ago   Was referred to GI   Dr Lissy Mclaughlin did colonoscopy : nothing serious. Report not available. No fever or chill  BM normal.  No melena or hematochezia  No belching. Or excessive flatus  UGI is normal.   Taking medications regularly. No side effects noted. Review of Systems    OBJECTIVE: BP (!) 160/90   Pulse 72   Temp 98.2 °F (36.8 °C)   Resp 16   Wt 173 lb 3.2 oz (78.6 kg)   SpO2 96%   BMI 25.58 kg/m²     Wt Readings from Last 3 Encounters:   21 173 lb 3.2 oz (78.6 kg)   20 168 lb 9.6 oz (76.5 kg)   20 168 lb 3.2 oz (76.3 kg)      Patient was seen taking COVID-19 precautions. Face mask, gloves and eyes protectors were used. Patient also wore facemask. GENERAL:  Alert, oriented, pleasant, in no apparent distress. HEENT:  Conjunctiva pink, no scleral icterus. ENT clear. NECK: Supple. No jugular venous distention noted. No masses felt,  CARDIOVASCULAR:  Normal S1 and S2    PULMONARY:  No respiratory distress. No wheezes or rales. ABDOMEN:  Soft and non-tender,no masses  Ororganomegaly. Bowel sounds are normal.   EXTREMITIES:  No cyanosis, clubbing, or significant edema. SKIN: Skin is warm and dry. NEUROLOGICAL:  Cranial nerves II through XII are grossly intact. IMPRESSION:    Encounter Diagnoses   Name Primary?     Dysuria Yes    Essential hypertension     Acquired hypothyroidism  Benign prostatic hyperplasia with urinary frequency     Bladder carcinoma (HCC)     Bloated abdomen        ASSESSMENT/PLAN:    Pt has dysuria : urine culture and repeat UA is normal.  Advised pt to see urologist  D/c oxybutinin    Pt has bloated feeling for few months  CT abd was negative  Advised pt to discuss with Dr Janine Puntam : has appointment next week. Continue rest of the medication. ANMOL    Mediations reviewed with the patient. Continue current medications. Appropriate prescriptions are addressed. After visit summeryprovided. Follow up as directed sooner if needed. Questions answered and patient verbalizes understanding. Call for any problems, questions, or concerns. Allergies   Allergen Reactions    Antihistamines, Chlorpheniramine-Type      Tachycardia      Pneumovax 23 [Pneumococcal Vac Polyvalent]      Body aches, redness at injection site     Current Outpatient Medications   Medication Sig Dispense Refill    pantoprazole (PROTONIX) 20 MG tablet Take 1 tablet by mouth daily 90 tablet 0    amLODIPine (NORVASC) 5 MG tablet Take 1 tablet by mouth daily 90 tablet 1    atorvastatin (LIPITOR) 40 MG tablet TAKE ONE TABLET BY MOUTH ONCE DAILY 90 tablet 1    levothyroxine (LEVOTHROID) 50 MCG tablet Take 1 tablet by mouth Daily 90 tablet 1    oxybutynin (DITROPAN-XL) 5 MG extended release tablet Take 5 mg by mouth nightly      OMEGA-3 FATTY ACIDS PO Take by mouth daily      aspirin 81 MG tablet Take 1 tablet by mouth daily 30 tablet 5    MULTIPLE VITAMIN PO Take 1 tablet by mouth daily. No current facility-administered medications for this visit. Past Medical History:   Diagnosis Date    Benign prostatic hyperplasia with urinary frequency 11/9/2017    Patient is taking Saw Palmetto combinations    Bladder carcinoma ([facility] [address] 2/6/2019    Pt had TCC of lateral bladder wall. Had treatment  Dr Jessa Sánchez seeing patient.      Bladder tumor 10/15/2018 Ultrasound of the kidneys and bladder on 2018 showed a 2 cm tumor in the bladder. Patient is referred to urologist Dr. Alisa Wolf.  H/O colonoscopy 3/2012    Dr. Grady Bowers (hyperlipidemia)     Hypothyroidism      Past Surgical History:   Procedure Laterality Date    APPENDECTOMY  age 24    COLONOSCOPY  2012     DR. Chan     Social History     Tobacco Use    Smoking status: Former Smoker     Packs/day: 1.00     Years: 35.00     Pack years: 35.00     Types: Cigarettes     Quit date: 1988     Years since quittin.2    Smokeless tobacco: Never Used   Substance Use Topics    Alcohol use: No     Alcohol/week: 0.0 standard drinks       LAB REVIEW:  CBC:   Lab Results   Component Value Date    WBC 8.4 2019    HGB 14.6 2019    HCT 43.2 2019     2019     Lipids:   Lab Results   Component Value Date    HDL 61 2020    LDLDIRECT 64 2020    TRIGLYCFAST 167 (H) 2020    CHOLFAST 139 2020     Renal:   Lab Results   Component Value Date    BUN 14 2020    CREATININE 1.2 2020     2020    K 4.7 2020    ALT 26 2020    AST 26 2020    GLUCOSE 93 2020    GLUF 99 2020     PT/INR: No results found for: INR  A1C: No results found for: Milton Moreno MD, 2021 , 2:54 PM

## 2021-03-08 RX ORDER — PANTOPRAZOLE SODIUM 20 MG/1
20 TABLET, DELAYED RELEASE ORAL DAILY
Qty: 90 TABLET | Refills: 0 | Status: SHIPPED | OUTPATIENT
Start: 2021-03-08 | End: 2021-04-23 | Stop reason: SDUPTHER

## 2021-04-17 ENCOUNTER — HOSPITAL ENCOUNTER (OUTPATIENT)
Age: 76
Discharge: HOME | End: 2021-04-17
Payer: COMMERCIAL

## 2021-04-17 LAB
ALBUMIN SERPL-MCNC: 4.4 GM/DL (ref 3.4–5)
ALP BLD-CCNC: 113 IU/L (ref 40–129)
ALT SERPL-CCNC: 24 U/L (ref 10–40)
ANION GAP SERPL CALCULATED.3IONS-SCNC: 1 MMOL/L (ref 4–16)
AST SERPL-CCNC: 23 IU/L (ref 15–37)
BILIRUB SERPL-MCNC: 0.5 MG/DL (ref 0–1)
BUN BLDV-MCNC: 13 MG/DL (ref 6–23)
CALCIUM SERPL-MCNC: 9.9 MG/DL (ref 8.3–10.6)
CHLORIDE BLD-SCNC: 103 MMOL/L (ref 99–110)
CHOLESTEROL, FASTING: 120 MG/DL
CO2: 37 MMOL/L (ref 21–32)
CREAT SERPL-MCNC: 1.1 MG/DL (ref 0.9–1.3)
GFR AFRICAN AMERICAN: >60 ML/MIN/1.73M2
GFR NON-AFRICAN AMERICAN: >60 ML/MIN/1.73M2
GLUCOSE BLD-MCNC: 92 MG/DL (ref 70–99)
HDLC SERPL-MCNC: 55 MG/DL
LDL CHOLESTEROL DIRECT: 55 MG/DL
POTASSIUM SERPL-SCNC: 4.3 MMOL/L (ref 3.5–5.1)
SODIUM BLD-SCNC: 141 MMOL/L (ref 135–145)
TOTAL PROTEIN: 7.1 GM/DL (ref 6.4–8.2)
TRIGLYCERIDE, FASTING: 115 MG/DL

## 2021-04-17 PROCEDURE — 80053 COMPREHEN METABOLIC PANEL: CPT

## 2021-04-17 PROCEDURE — 80061 LIPID PANEL: CPT

## 2021-04-17 PROCEDURE — 36415 COLL VENOUS BLD VENIPUNCTURE: CPT

## 2021-04-23 ENCOUNTER — OFFICE VISIT (OUTPATIENT)
Dept: INTERNAL MEDICINE CLINIC | Age: 76
End: 2021-04-23
Payer: COMMERCIAL

## 2021-04-23 VITALS
DIASTOLIC BLOOD PRESSURE: 70 MMHG | WEIGHT: 173.4 LBS | BODY MASS INDEX: 25.61 KG/M2 | TEMPERATURE: 98.5 F | OXYGEN SATURATION: 98 % | SYSTOLIC BLOOD PRESSURE: 136 MMHG | RESPIRATION RATE: 16 BRPM | HEART RATE: 76 BPM

## 2021-04-23 DIAGNOSIS — C67.9 BLADDER CARCINOMA (HCC): ICD-10-CM

## 2021-04-23 DIAGNOSIS — I10 ESSENTIAL HYPERTENSION: ICD-10-CM

## 2021-04-23 DIAGNOSIS — R13.19 ESOPHAGEAL DYSPHAGIA: ICD-10-CM

## 2021-04-23 DIAGNOSIS — R19.5 POSITIVE COLORECTAL CANCER SCREENING USING COLOGUARD TEST: ICD-10-CM

## 2021-04-23 DIAGNOSIS — R35.0 BENIGN PROSTATIC HYPERPLASIA WITH URINARY FREQUENCY: ICD-10-CM

## 2021-04-23 DIAGNOSIS — E78.00 PURE HYPERCHOLESTEROLEMIA: ICD-10-CM

## 2021-04-23 DIAGNOSIS — N40.1 BENIGN PROSTATIC HYPERPLASIA WITH URINARY FREQUENCY: ICD-10-CM

## 2021-04-23 DIAGNOSIS — E03.9 ACQUIRED HYPOTHYROIDISM: ICD-10-CM

## 2021-04-23 PROCEDURE — 99214 OFFICE O/P EST MOD 30 MIN: CPT | Performed by: INTERNAL MEDICINE

## 2021-04-23 RX ORDER — PANTOPRAZOLE SODIUM 20 MG/1
20 TABLET, DELAYED RELEASE ORAL DAILY
Qty: 90 TABLET | Refills: 1 | Status: SHIPPED | OUTPATIENT
Start: 2021-04-23 | End: 2021-10-05 | Stop reason: SDUPTHER

## 2021-04-23 ASSESSMENT — PATIENT HEALTH QUESTIONNAIRE - PHQ9
2. FEELING DOWN, DEPRESSED OR HOPELESS: 0
SUM OF ALL RESPONSES TO PHQ QUESTIONS 1-9: 0
SUM OF ALL RESPONSES TO PHQ QUESTIONS 1-9: 0
1. LITTLE INTEREST OR PLEASURE IN DOING THINGS: 0

## 2021-04-23 NOTE — ASSESSMENT & PLAN NOTE
Patient is on atorvastatin 40 mg daily  Hyperlipidemia is stable. Follow low cholesterol diet. Continue current treatment.   Lipid profile is good

## 2021-04-23 NOTE — PROGRESS NOTES
Shane Valerio  Patient's  is 1945  Seen in office on 2021      SUBJECTIVE:  Edwin goetz 77 y. o.year old male presents today   Chief Complaint   Patient presents with    Hypertension    Hyperlipidemia    Results     lab review    Medication Refill     wants printed rx    Other     Saw DR. Finnegan, colonoscopy 21, Sees Dr. Matthew Castillo, Dr. Kylie Alanis 3-     Pt is here for f/u of HTN HLD and hypothyroidism  Patient has hypertension. Taking medications No headaches, no chest pain, no palpitations and no dizziness. Patient has hyperlipidemia. Taking medications. No abdominal pain, no nausea or vomiting. No myalgias. Patient states he is feeling well. Denies any fatigue. Lab results were reviewed with the patient in detail. Patient state he saw gastroenterologist and had colonoscopy on 2021. He has seen urologist Dr. Kylie Alanis also. He is doing fairly well. Taking medications regularly. No side effects noted. Review of Systems    OBJECTIVE: /70   Pulse 76   Temp 98.5 °F (36.9 °C) (Oral)   Resp 16   Wt 173 lb 6.4 oz (78.7 kg)   SpO2 98%   BMI 25.61 kg/m²     Wt Readings from Last 3 Encounters:   21 173 lb 6.4 oz (78.7 kg)   21 173 lb 3.2 oz (78.6 kg)   20 168 lb 9.6 oz (76.5 kg)        EXAM :  Patient was seen taking COVID-19 precautions. Face mask, gloves and eyes protectors were used. Patient also wore facemask. GENERAL:  Alert, oriented, pleasant, in no apparent distress. HEENT:  Conjunctiva pink, no scleral icterus. ENT clear. NECK: Supple. No jugular venous distention noted. No masses felt,  CARDIOVASCULAR:  Normal S1 and S2    PULMONARY:  No respiratory distress. No wheezes or rales. ABDOMEN:  Soft and non-tender,no masses  ororganomegaly. EXTREMITIES:  No cyanosis, clubbing, or significant edema. SKIN: Skin is warm and dry. NEUROLOGICAL:  Cranial nerves II through XII are grossly intact.       IMPRESSION:    Encounter Diagnoses   Name Primary?  Acquired hypothyroidism     Benign prostatic hyperplasia with urinary frequency     Bladder carcinoma (HCC)     Esophageal dysphagia     Essential hypertension     Positive colorectal cancer screening using Cologuard test     Pure hypercholesterolemia        ASSESSMENT/PLAN:    Acquired hypothyroidism  Patient is on levothyroxine 50 mcg daily  Patient is stable. Continue current treatment. TSH is normal : 9/2021    Benign prostatic hyperplasia with urinary frequency  Symptoms are better. Oxybutynin:     Bladder carcinoma ([address]  Pt had TCC of lateral bladder wall Dx 12/18. Had treatment   Dr Melissa Yeung seeing patient. Having cystoscopy q 3-6 month    Esophageal dysphagia  Patient has esophageal dysphagia going to the back. ct chest is normal. UGI mild HH and dysmotility . trial of protonix 40 mg daily    Essential hypertension  Patient is taking amlodipine  Hypertension in control. Follow low salt diet. Continue current treatment. Positive colorectal cancer screening using Cologuard test  Refer pt to gastroenterologist of his choice. Colonoscopy : 2/2021 : adenoma. F/u in 5 years   Refer to Dr Sina John : done     Pure hypercholesterolemia  Patient is on atorvastatin 40 mg daily  Hyperlipidemia is stable. Follow low cholesterol diet. Continue current treatment. Lipid profile is good     Return to office in 3 months. Mediations reviewed with the patient. Continue current medications. Appropriate prescriptions are addressed. After visit summeryprovided. Follow up as directed sooner if needed. Questions answered and patient verbalizes understanding. Call for any problems, questions, or concerns.        Allergies   Allergen Reactions    Antihistamines, Chlorpheniramine-Type      Tachycardia      Pneumovax 23 [Pneumococcal Vac Polyvalent]      Body aches, redness at injection site     Current Outpatient Medications   Medication Sig Dispense Refill    pantoprazole (PROTONIX) 20 MG tablet Take 1 tablet by mouth daily 90 tablet 0    amLODIPine (NORVASC) 5 MG tablet Take 1 tablet by mouth daily 90 tablet 1    atorvastatin (LIPITOR) 40 MG tablet TAKE ONE TABLET BY MOUTH ONCE DAILY 90 tablet 1    levothyroxine (SYNTHROID) 50 MCG tablet Take 1 tablet by mouth Daily 90 tablet 1    oxybutynin (DITROPAN-XL) 5 MG extended release tablet Take 5 mg by mouth nightly      OMEGA-3 FATTY ACIDS PO Take by mouth daily      aspirin 81 MG tablet Take 1 tablet by mouth daily 30 tablet 5    MULTIPLE VITAMIN PO Take 1 tablet by mouth daily. No current facility-administered medications for this visit. Past Medical History:   Diagnosis Date    Benign prostatic hyperplasia with urinary frequency 2017    Patient is taking Saw Palmetto combinations    Bladder carcinoma ([facility] [address] 2019    Pt had TCC of lateral bladder wall. Had treatment  Dr Aby Christian seeing patient.  Bladder tumor 10/15/2018    Ultrasound of the kidneys and bladder on 2018 showed a 2 cm tumor in the bladder. Patient is referred to urologist Dr. Aby Christian.  H/O colonoscopy 3/2012    Dr. Monika Lewis (hyperlipidemia)     Hypothyroidism      Past Surgical History:   Procedure Laterality Date    APPENDECTOMY  age 24    COLONOSCOPY  2012     DR. Chan    COLONOSCOPY  2021    repeat in 5 yrs per Dr. Chen [place] History     Tobacco Use    Smoking status: Former Smoker     Packs/day: 1.00     Years: 35.00     Pack years: 35.00     Types: Cigarettes     Quit date: 1988     Years since quittin.4    Smokeless tobacco: Never Used   Substance Use Topics    Alcohol use: No     Alcohol/week: 0.0 standard drinks       LAB REVIEW:  CBC:   Lab Results   Component Value Date    WBC 8.4 2019    HGB 14.6 2019    HCT 43.2 2019     2019     Lipids:   Lab Results   Component Value Date    HDL 55 2021    LDLDIRECT 55 2021    TRIGLYCFAST 115 04/17/2021    CHOLFAST 120 04/17/2021     Renal:   Lab Results   Component Value Date    BUN 13 04/17/2021    CREATININE 1.1 04/17/2021     04/17/2021    K 4.3 04/17/2021    ALT 24 04/17/2021    AST 23 04/17/2021    GLUCOSE 92 04/17/2021    GLUF 99 09/02/2020     PT/INR: No results found for: INR  A1C: No results found for: Jasiel Allred MD, 4/23/2021 , 12:13 PM

## 2021-04-23 NOTE — ASSESSMENT & PLAN NOTE
Pt had TCC of lateral bladder wall Dx 12/18. Had treatment   Dr Payam Lyle seeing patient.  Having cystoscopy q 3-6 month

## 2021-07-09 ENCOUNTER — OFFICE VISIT (OUTPATIENT)
Dept: INTERNAL MEDICINE CLINIC | Age: 76
End: 2021-07-09
Payer: COMMERCIAL

## 2021-07-09 VITALS
BODY MASS INDEX: 25.84 KG/M2 | TEMPERATURE: 97.9 F | HEART RATE: 83 BPM | OXYGEN SATURATION: 98 % | WEIGHT: 175 LBS | DIASTOLIC BLOOD PRESSURE: 90 MMHG | SYSTOLIC BLOOD PRESSURE: 168 MMHG

## 2021-07-09 DIAGNOSIS — R35.0 BENIGN PROSTATIC HYPERPLASIA WITH URINARY FREQUENCY: ICD-10-CM

## 2021-07-09 DIAGNOSIS — R13.19 ESOPHAGEAL DYSPHAGIA: ICD-10-CM

## 2021-07-09 DIAGNOSIS — E78.00 PURE HYPERCHOLESTEROLEMIA: ICD-10-CM

## 2021-07-09 DIAGNOSIS — E03.9 ACQUIRED HYPOTHYROIDISM: ICD-10-CM

## 2021-07-09 DIAGNOSIS — C67.9 BLADDER CARCINOMA (HCC): ICD-10-CM

## 2021-07-09 DIAGNOSIS — I10 ESSENTIAL HYPERTENSION: Primary | ICD-10-CM

## 2021-07-09 DIAGNOSIS — N40.1 BENIGN PROSTATIC HYPERPLASIA WITH URINARY FREQUENCY: ICD-10-CM

## 2021-07-09 PROCEDURE — 99214 OFFICE O/P EST MOD 30 MIN: CPT | Performed by: INTERNAL MEDICINE

## 2021-07-09 RX ORDER — AMLODIPINE BESYLATE 10 MG/1
10 TABLET ORAL DAILY
Qty: 90 TABLET | Refills: 1 | Status: SHIPPED | OUTPATIENT
Start: 2021-07-09 | End: 2021-12-06

## 2021-07-09 RX ORDER — ATORVASTATIN CALCIUM 40 MG/1
TABLET, FILM COATED ORAL
Qty: 90 TABLET | Refills: 1 | Status: SHIPPED | OUTPATIENT
Start: 2021-07-09 | End: 2021-10-05 | Stop reason: SDUPTHER

## 2021-07-09 RX ORDER — SIMETHICONE 80 MG
80 TABLET,CHEWABLE ORAL 3 TIMES DAILY PRN
Qty: 90 TABLET | Refills: 2 | Status: SHIPPED | OUTPATIENT
Start: 2021-07-09 | End: 2021-10-05 | Stop reason: SDUPTHER

## 2021-07-09 RX ORDER — LEVOTHYROXINE SODIUM 0.05 MG/1
50 TABLET ORAL DAILY
Qty: 90 TABLET | Refills: 1 | Status: SHIPPED | OUTPATIENT
Start: 2021-07-09 | End: 2021-12-06 | Stop reason: SDUPTHER

## 2021-07-09 RX ORDER — AMLODIPINE BESYLATE 5 MG/1
5 TABLET ORAL DAILY
Qty: 90 TABLET | Refills: 1 | Status: SHIPPED | OUTPATIENT
Start: 2021-07-09 | End: 2021-07-09 | Stop reason: CLARIF

## 2021-07-09 NOTE — PROGRESS NOTES
Kristi Santo  Patient's  is 1945  Seen in office on 2021      SUBJECTIVE:  Alec goetz 77 y. o.year old male presents today   Chief Complaint   Patient presents with    3 Month Follow-Up     Hypertension and Cholesterol      Patient is here for follow-up of the hypertension hyperlipidemia and also bloated feeling  Patient states he saw gastroenterologist Dr. Evie Mobley nurse practitioner. He complained to the months of the bloated feeling. He was told to use align, fiber and simethicone. Patient stated align did not help him. He did not take simethicone. Still has bloated feeling as a discomfort and bothers him. He had a CT scan of the abdomen, upper GI series done. CT scan of the abdomen had showed colonic diverticulosis without any acute diverticulitis. Upper GI series on 2020 has shown esophageal dysmotility and a small hiatal hernia. Patient was referred to gastroenterologist also. Patient is taking Protonix. But he still has symptoms. Discussed with patient to get EGD done but he is reluctant. Taking medications regularly. No side effects noted. Review of Systems     Review of system is normal except as in HPI      OBJECTIVE: BP (!) 168/90   Pulse 83   Temp 97.9 °F (36.6 °C)   Wt 175 lb (79.4 kg)   SpO2 98%   BMI 25.84 kg/m²     Wt Readings from Last 3 Encounters:   21 175 lb (79.4 kg)   21 173 lb 6.4 oz (78.7 kg)   21 173 lb 3.2 oz (78.6 kg)      GENERAL:  Alert, oriented, pleasant, in no apparent distress. HEENT:  Conjunctiva pink, no scleral icterus. ENT clear. NECK: Supple. No jugular venous distention noted. No masses felt,  CARDIOVASCULAR:  Normal S1 and S2    PULMONARY:  No respiratory distress. No wheezes or rales. ABDOMEN:  Soft and non-tender,no masses  ororganomegaly. EXTREMITIES:  No cyanosis, clubbing, or significant edema. SKIN: Skin is warm and dry. NEUROLOGICAL:  Cranial nerves II through XII are grossly intact. IMPRESSION:    Encounter Diagnoses   Name Primary?  Essential hypertension Yes    Pure hypercholesterolemia     Esophageal dysphagia     Benign prostatic hyperplasia with urinary frequency     Acquired hypothyroidism     Bladder carcinoma (HCC)        ASSESSMENT/PLAN:    .  Patient blood pressure is elevated today. Increase amlodipine to 10 mg daily. Follow low-salt diet. .  Bloated feeling. Discussed with patient. GI thinks patient may have IBS. I mentioned patient to try simethicone 80 mg chewables 3 times daily as needed give a prescription for that. If it is not getting better he needs to see GI again for further work-up. Patient may try Xifaxan but he had refused to gastroenterologist  Continue protonix  simethcone 80 ng tid prn  Hyperlipidemia is stable. Follow low cholesterol diet. Continue current treatment. Hypertension in control. Follow low salt diet. Continue current treatment. Bladder cancer : sees urologist   Hypothyroidism : continue same levothyroxine. Return to office in 3 months. Recheck BP next week  Orders Placed This Encounter   Procedures    CBC Auto Differential    Comprehensive Metabolic Panel    Lipid, Fasting    TSH without Reflex           Mediations reviewed with the patient. Continue current medications. Appropriate prescriptions are addressed. After visit summeryprovided. Follow up as directed sooner if needed. Questions answered and patient verbalizes understanding. Call for any problems, questions, or concerns.        Allergies   Allergen Reactions    Antihistamines, Chlorpheniramine-Type      Tachycardia      Pneumovax 23 [Pneumococcal Vac Polyvalent]      Body aches, redness at injection site     Current Outpatient Medications   Medication Sig Dispense Refill    pantoprazole (PROTONIX) 20 MG tablet Take 1 tablet by mouth daily 90 tablet 1    amLODIPine (NORVASC) 5 MG tablet Take 1 tablet by mouth daily 90 tablet 1    atorvastatin (LIPITOR) 40 MG tablet TAKE ONE TABLET BY MOUTH ONCE DAILY 90 tablet 1    levothyroxine (SYNTHROID) 50 MCG tablet Take 1 tablet by mouth Daily 90 tablet 1    OMEGA-3 FATTY ACIDS PO Take by mouth daily      aspirin 81 MG tablet Take 1 tablet by mouth daily 30 tablet 5    MULTIPLE VITAMIN PO Take 1 tablet by mouth daily.  oxybutynin (DITROPAN-XL) 5 MG extended release tablet Take 5 mg by mouth nightly (Patient not taking: Reported on 2021)       No current facility-administered medications for this visit. Past Medical History:   Diagnosis Date    Benign prostatic hyperplasia with urinary frequency 2017    Patient is taking Saw Palmetto combinations    Bladder carcinoma ([address] 2019    Pt had TCC of lateral bladder wall. Had treatment  Dr Timbo Marquez seeing patient.  Bladder tumor 10/15/2018    Ultrasound of the kidneys and bladder on 2018 showed a 2 cm tumor in the bladder. Patient is referred to urologist Dr. Timbo Marquez.  H/O colonoscopy 3/2012    Dr. Adeola Johnson (hyperlipidemia)     Hypothyroidism      Past Surgical History:   Procedure Laterality Date    APPENDECTOMY  age 24    COLONOSCOPY  2012     DR. Chan    COLONOSCOPY  2021    repeat in 5 yrs per Dr. Mirlande Rodríguez History     Tobacco Use    Smoking status: Former Smoker     Packs/day: 1.00     Years: 35.00     Pack years: 35.00     Types: Cigarettes     Quit date: 1988     Years since quittin.6    Smokeless tobacco: Never Used   Substance Use Topics    Alcohol use: No     Alcohol/week: 0.0 standard drinks       LAB REVIEW:  CBC:   Lab Results   Component Value Date    WBC 8.4 2019    HGB 14.6 2019    HCT 43.2 2019     2019     Lipids:   Lab Results   Component Value Date    HDL 55 2021    LDLDIRECT 55 2021    TRIGLYCFAST 115 2021    CHOLFAST 120 2021     Renal:   Lab Results   Component Value Date    BUN 13 2021    CREATININE 1.1 2021  04/17/2021    K 4.3 04/17/2021    ALT 24 04/17/2021    AST 23 04/17/2021    GLUCOSE 92 04/17/2021    GLUF 99 09/02/2020     PT/INR: No results found for: INR  A1C: No results found for: Mele Pinzon MD, 7/9/2021 , 12:13 PM

## 2021-08-31 ENCOUNTER — OFFICE VISIT (OUTPATIENT)
Dept: INTERNAL MEDICINE CLINIC | Age: 76
End: 2021-08-31
Payer: COMMERCIAL

## 2021-08-31 VITALS
DIASTOLIC BLOOD PRESSURE: 78 MMHG | SYSTOLIC BLOOD PRESSURE: 138 MMHG | RESPIRATION RATE: 16 BRPM | TEMPERATURE: 98.3 F | BODY MASS INDEX: 26.28 KG/M2 | OXYGEN SATURATION: 96 % | HEIGHT: 69 IN | WEIGHT: 177.4 LBS | HEART RATE: 84 BPM

## 2021-08-31 DIAGNOSIS — R14.0 BLOATED ABDOMEN: Primary | ICD-10-CM

## 2021-08-31 DIAGNOSIS — I10 ESSENTIAL HYPERTENSION: ICD-10-CM

## 2021-08-31 PROCEDURE — 99213 OFFICE O/P EST LOW 20 MIN: CPT | Performed by: INTERNAL MEDICINE

## 2021-08-31 ASSESSMENT — PATIENT HEALTH QUESTIONNAIRE - PHQ9
SUM OF ALL RESPONSES TO PHQ QUESTIONS 1-9: 0
1. LITTLE INTEREST OR PLEASURE IN DOING THINGS: 0
2. FEELING DOWN, DEPRESSED OR HOPELESS: 0
SUM OF ALL RESPONSES TO PHQ9 QUESTIONS 1 & 2: 0
SUM OF ALL RESPONSES TO PHQ QUESTIONS 1-9: 0
SUM OF ALL RESPONSES TO PHQ QUESTIONS 1-9: 0

## 2021-08-31 NOTE — PROGRESS NOTES
Viktoriya Young  Patient's  is 1945  Seen in office on 2021      SUBJECTIVE:  Dahlia David sofy 77 y. o.year old male presents today   Chief Complaint   Patient presents with    Bloated     no pain, sometimes nausea    Edema     ankles and feet just started a couple of weeks ago       Patient is here for follow-up of his bloated feeling  Pt state he still get bloated but feels little better after taking simethicone. He is taking simethicone chewables 80 mg 3 times a day. He is not taking any probiotics. No NVD  No fever or chills  He also has some ventral hernia when he tries to get up feels bulging on the upper abdomen. No pain associated with that. Pt has seen Dr Buddy Diaz in the past.  If the symptoms get worse he wants to see him again. Patient states he is feeling better. He also complains of some trace pedal edema at the end of the day. He noticed that since amlodipine was increased to 10 mg daily. No pain in the muscles. Taking medications regularly. No side effects noted. Review of Systems    OBJECTIVE: /78   Pulse 84   Temp 98.3 °F (36.8 °C) (Oral)   Resp 16   Ht 5' 9\" (1.753 m) Comment: with shoes  Wt 177 lb 6.4 oz (80.5 kg)   SpO2 96%   BMI 26.20 kg/m²     Wt Readings from Last 3 Encounters:   21 177 lb 6.4 oz (80.5 kg)   21 175 lb (79.4 kg)   21 173 lb 6.4 oz (78.7 kg)      GENERAL:  Alert, oriented, pleasant, in no apparent distress. HEENT:  Conjunctiva pink, no scleral icterus. ENT clear. NECK: Supple. No jugular venous distention noted. No masses felt,  CARDIOVASCULAR:  Normal S1 and S2    PULMONARY:  No respiratory distress. No wheezes or rales. ABDOMEN:  Soft and non-tender,no masses  ororganomegaly. EXTREMITIES:  No cyanosis, clubbing, no pedal edema today. SKIN: Skin is warm and dry. NEUROLOGICAL:  Cranial nerves II through XII are grossly intact. IMPRESSION:    Encounter Diagnoses   Name Primary?     Bloated abdomen Yes    Essential hypertension        ASSESSMENT/PLAN:    Bloated abdomen and flatulence. Continue simethicone chewable 80 mg 3 times a day and add align probiotics. Trace pedal edema is most probably due to amlodipine. He has no signs of congestive heart failure otherwise. Will observe for now. Keep the leg elevated at rest.  May need to use compression stockings. If edema gets worse will decrease amlodipine dose. Bloated feeling does not improve he needs to see GI patient understands. Continue rest of the medication and keep follow-up appointment with the blood test drawn. Mediations reviewed with the patient. Continue current medications. Appropriate prescriptions are addressed. After visit summeryprovided. Follow up as directed sooner if needed. Questions answered and patient verbalizes understanding. Call for any problems, questions, or concerns. Allergies   Allergen Reactions    Antihistamines, Chlorpheniramine-Type      Tachycardia      Pneumovax 23 [Pneumococcal Vac Polyvalent]      Body aches, redness at injection site     Current Outpatient Medications   Medication Sig Dispense Refill    atorvastatin (LIPITOR) 40 MG tablet TAKE ONE TABLET BY MOUTH ONCE DAILY 90 tablet 1    levothyroxine (SYNTHROID) 50 MCG tablet Take 1 tablet by mouth Daily 90 tablet 1    simethicone (MYLICON) 80 MG chewable tablet Take 1 tablet by mouth 3 times daily as needed for Flatulence 90 tablet 2    amLODIPine (NORVASC) 10 MG tablet Take 1 tablet by mouth daily 90 tablet 1    pantoprazole (PROTONIX) 20 MG tablet Take 1 tablet by mouth daily 90 tablet 1    OMEGA-3 FATTY ACIDS PO Take by mouth daily      aspirin 81 MG tablet Take 1 tablet by mouth daily 30 tablet 5    MULTIPLE VITAMIN PO Take 1 tablet by mouth daily.       oxybutynin (DITROPAN-XL) 5 MG extended release tablet Take 5 mg by mouth nightly (Patient not taking: Reported on 7/9/2021)       No current facility-administered medications for this visit. Past Medical History:   Diagnosis Date    Benign prostatic hyperplasia with urinary frequency 2017    Patient is taking Saw Palmetto combinations    Bladder carcinoma ([address] 2019    Pt had TCC of lateral bladder wall. Had treatment  Dr Arabella Sales seeing patient.  Bladder tumor 10/15/2018    Ultrasound of the kidneys and bladder on 2018 showed a 2 cm tumor in the bladder. Patient is referred to urologist Dr. Arabella Sales.  H/O colonoscopy 3/2012    Dr. Tony Ignacio (hyperlipidemia)     Hypothyroidism      Past Surgical History:   Procedure Laterality Date    APPENDECTOMY  age 24    COLONOSCOPY  2012     DR. Chan    COLONOSCOPY  2021    repeat in 5 yrs per Dr. Valeria Alejo History     Tobacco Use    Smoking status: Former Smoker     Packs/day: 1.00     Years: 35.00     Pack years: 35.00     Types: Cigarettes     Quit date: 1988     Years since quittin.7    Smokeless tobacco: Never Used   Substance Use Topics    Alcohol use: No     Alcohol/week: 0.0 standard drinks       LAB REVIEW:  CBC:   Lab Results   Component Value Date    WBC 8.4 2019    HGB 14.6 2019    HCT 43.2 2019     2019     Lipids:   Lab Results   Component Value Date    HDL 55 2021    LDLDIRECT 55 2021    TRIGLYCFAST 115 2021    CHOLFAST 120 2021     Renal:   Lab Results   Component Value Date    BUN 13 2021    CREATININE 1.1 2021     2021    K 4.3 2021    ALT 24 2021    AST 23 2021    GLUCOSE 92 2021    GLUF 99 2020     PT/INR: No results found for: INR  A1C: No results found for: Kirk Merlos MD, 2021 , 9:09 AM

## 2021-09-29 ENCOUNTER — HOSPITAL ENCOUNTER (OUTPATIENT)
Age: 76
Discharge: HOME | End: 2021-09-29
Payer: COMMERCIAL

## 2021-09-29 LAB
ALBUMIN SERPL-MCNC: 4.5 GM/DL (ref 3.4–5)
ALP BLD-CCNC: 121 IU/L (ref 40–129)
ALT SERPL-CCNC: 25 U/L (ref 10–40)
ANION GAP SERPL CALCULATED.3IONS-SCNC: 13 MMOL/L (ref 4–16)
AST SERPL-CCNC: 23 IU/L (ref 15–37)
BASOPHILS ABSOLUTE: 0.1 K/CU MM
BASOPHILS RELATIVE PERCENT: 0.8 % (ref 0–1)
BILIRUB SERPL-MCNC: 0.5 MG/DL (ref 0–1)
BUN BLDV-MCNC: 13 MG/DL (ref 6–23)
CALCIUM SERPL-MCNC: 9.8 MG/DL (ref 8.3–10.6)
CHLORIDE BLD-SCNC: 104 MMOL/L (ref 99–110)
CHOLESTEROL, FASTING: 129 MG/DL
CO2: 26 MMOL/L (ref 21–32)
CREAT SERPL-MCNC: 1.1 MG/DL (ref 0.9–1.3)
DIFFERENTIAL TYPE: ABNORMAL
EOSINOPHILS ABSOLUTE: 0.3 K/CU MM
EOSINOPHILS RELATIVE PERCENT: 4.6 % (ref 0–3)
GFR AFRICAN AMERICAN: >60 ML/MIN/1.73M2
GFR NON-AFRICAN AMERICAN: >60 ML/MIN/1.73M2
GLUCOSE BLD-MCNC: 102 MG/DL (ref 70–99)
HCT VFR BLD CALC: 48.5 % (ref 42–52)
HDLC SERPL-MCNC: 54 MG/DL
HEMOGLOBIN: 16.2 GM/DL (ref 13.5–18)
IMMATURE NEUTROPHIL %: 0.5 % (ref 0–0.43)
LDL CHOLESTEROL DIRECT: 51 MG/DL
LYMPHOCYTES ABSOLUTE: 2.1 K/CU MM
LYMPHOCYTES RELATIVE PERCENT: 31.9 % (ref 24–44)
MCH RBC QN AUTO: 31.2 PG (ref 27–31)
MCHC RBC AUTO-ENTMCNC: 33.4 % (ref 32–36)
MCV RBC AUTO: 93.3 FL (ref 78–100)
MONOCYTES ABSOLUTE: 0.6 K/CU MM
MONOCYTES RELATIVE PERCENT: 9.6 % (ref 0–4)
PDW BLD-RTO: 13.2 % (ref 11.7–14.9)
PLATELET # BLD: 245 K/CU MM (ref 140–440)
PMV BLD AUTO: 9 FL (ref 7.5–11.1)
POTASSIUM SERPL-SCNC: 4.2 MMOL/L (ref 3.5–5.1)
RBC # BLD: 5.2 M/CU MM (ref 4.6–6.2)
SEGMENTED NEUTROPHILS ABSOLUTE COUNT: 3.4 K/CU MM
SEGMENTED NEUTROPHILS RELATIVE PERCENT: 52.6 % (ref 36–66)
SODIUM BLD-SCNC: 143 MMOL/L (ref 135–145)
TOTAL IMMATURE NEUTOROPHIL: 0.03 K/CU MM
TOTAL PROTEIN: 7.2 GM/DL (ref 6.4–8.2)
TRIGLYCERIDE, FASTING: 134 MG/DL
TSH HIGH SENSITIVITY: 3.21 UIU/ML (ref 0.27–4.2)
WBC # BLD: 6.5 K/CU MM (ref 4–10.5)

## 2021-09-29 PROCEDURE — 84443 ASSAY THYROID STIM HORMONE: CPT

## 2021-09-29 PROCEDURE — 80053 COMPREHEN METABOLIC PANEL: CPT

## 2021-09-29 PROCEDURE — 36415 COLL VENOUS BLD VENIPUNCTURE: CPT

## 2021-09-29 PROCEDURE — 85025 COMPLETE CBC W/AUTO DIFF WBC: CPT

## 2021-09-29 PROCEDURE — 80061 LIPID PANEL: CPT

## 2021-10-05 ENCOUNTER — OFFICE VISIT (OUTPATIENT)
Dept: INTERNAL MEDICINE CLINIC | Age: 76
End: 2021-10-05
Payer: COMMERCIAL

## 2021-10-05 VITALS
WEIGHT: 174 LBS | BODY MASS INDEX: 25.7 KG/M2 | DIASTOLIC BLOOD PRESSURE: 72 MMHG | OXYGEN SATURATION: 98 % | RESPIRATION RATE: 16 BRPM | TEMPERATURE: 97.9 F | HEART RATE: 84 BPM | SYSTOLIC BLOOD PRESSURE: 138 MMHG

## 2021-10-05 DIAGNOSIS — R13.19 ESOPHAGEAL DYSPHAGIA: ICD-10-CM

## 2021-10-05 DIAGNOSIS — C67.9 BLADDER CARCINOMA (HCC): ICD-10-CM

## 2021-10-05 DIAGNOSIS — R35.0 BENIGN PROSTATIC HYPERPLASIA WITH URINARY FREQUENCY: ICD-10-CM

## 2021-10-05 DIAGNOSIS — E03.9 ACQUIRED HYPOTHYROIDISM: ICD-10-CM

## 2021-10-05 DIAGNOSIS — N40.1 BENIGN PROSTATIC HYPERPLASIA WITH URINARY FREQUENCY: ICD-10-CM

## 2021-10-05 DIAGNOSIS — R19.5 POSITIVE COLORECTAL CANCER SCREENING USING COLOGUARD TEST: ICD-10-CM

## 2021-10-05 DIAGNOSIS — I10 ESSENTIAL HYPERTENSION: ICD-10-CM

## 2021-10-05 DIAGNOSIS — E78.00 PURE HYPERCHOLESTEROLEMIA: ICD-10-CM

## 2021-10-05 PROCEDURE — 99214 OFFICE O/P EST MOD 30 MIN: CPT | Performed by: INTERNAL MEDICINE

## 2021-10-05 RX ORDER — ATORVASTATIN CALCIUM 40 MG/1
TABLET, FILM COATED ORAL
Qty: 90 TABLET | Refills: 1 | Status: SHIPPED | OUTPATIENT
Start: 2021-10-05 | End: 2022-04-06 | Stop reason: SDUPTHER

## 2021-10-05 RX ORDER — AMLODIPINE BESYLATE AND BENAZEPRIL HYDROCHLORIDE 5; 10 MG/1; MG/1
1 CAPSULE ORAL DAILY
Qty: 30 CAPSULE | Refills: 3 | Status: SHIPPED | OUTPATIENT
Start: 2021-10-05 | End: 2022-01-05 | Stop reason: SDUPTHER

## 2021-10-05 RX ORDER — PANTOPRAZOLE SODIUM 20 MG/1
20 TABLET, DELAYED RELEASE ORAL DAILY
Qty: 90 TABLET | Refills: 1 | Status: SHIPPED | OUTPATIENT
Start: 2021-10-05 | End: 2022-04-06 | Stop reason: SDUPTHER

## 2021-10-05 RX ORDER — SIMETHICONE 80 MG
80 TABLET,CHEWABLE ORAL 3 TIMES DAILY PRN
Qty: 90 TABLET | Refills: 1 | Status: SHIPPED | OUTPATIENT
Start: 2021-10-05 | End: 2021-12-06

## 2021-10-05 ASSESSMENT — PATIENT HEALTH QUESTIONNAIRE - PHQ9
SUM OF ALL RESPONSES TO PHQ QUESTIONS 1-9: 1
1. LITTLE INTEREST OR PLEASURE IN DOING THINGS: 0
2. FEELING DOWN, DEPRESSED OR HOPELESS: 1
SUM OF ALL RESPONSES TO PHQ9 QUESTIONS 1 & 2: 1

## 2021-10-05 NOTE — PROGRESS NOTES
Bryan All  Patient's  is 1945  Seen in office on 10/5/2021      SUBJECTIVE:  Jose Alfredo goetz 77 y. o.year old male presents today   Chief Complaint   Patient presents with    Follow-up    Results     lab review    Medication Refill    Foot Swelling     bilateral     Pt is here for f/u of HTN, HLD BPH, bladder cancer, hypothyroidism    Pt had labs done   All results reviewed     Patient has hypertension. Taking medications No headaches, no chest pain, no palpitations and no dizziness. Patient has hyperlipidemia. Taking medications. No abdominal pain, no nausea or vomiting. No myalgias. BPH is stable. Bloated feeling is better. No chest pain. No SOB  No HA  No NVD>     Taking medications regularly. No side effects noted. Review of Systems     Review of system is normal except as in HPI      OBJECTIVE: /72   Pulse 84   Temp 97.9 °F (36.6 °C) (Oral)   Resp 16   Wt 174 lb (78.9 kg)   SpO2 98%   BMI 25.70 kg/m²     Wt Readings from Last 3 Encounters:   10/05/21 174 lb (78.9 kg)   21 177 lb 6.4 oz (80.5 kg)   21 175 lb (79.4 kg)      Patient was seen taking COVID-19 precautions. Face mask, gloves were used. Patient also wore facemask. GENERAL:  Alert, oriented, pleasant, in no apparent distress. HEENT:  Conjunctiva pink, no scleral icterus. ENT clear. NECK: Supple. No jugular venous distention noted. No masses felt,  CARDIOVASCULAR:  Normal S1 and S2    PULMONARY:  No respiratory distress. No wheezes or rales. ABDOMEN:  Soft and non-tender,no masses  ororganomegaly. EXTREMITIES:  No cyanosis, clubbing, or significant edema. SKIN: Skin is warm and dry. NEUROLOGICAL:  Cranial nerves II through XII are grossly intact. IMPRESSION:    Encounter Diagnoses   Name Primary?     Essential hypertension     Acquired hypothyroidism     Pure hypercholesterolemia     Benign prostatic hyperplasia with urinary frequency     Bladder carcinoma (HCC)     Esophageal dysphagia     Positive colorectal cancer screening using Cologuard test        ASSESSMENT/PLAN:    Essential hypertension   Patient is taking amlodipine  Hypertension in control. Follow low salt diet. Continue current treatment. Acquired hypothyroidism   Patient is on levothyroxine 50 mcg daily  Patient is stable. Continue current treatment. TSH is normal    Pure hypercholesterolemia   Patient is on atorvastatin 40 mg daily  Hyperlipidemia is stable. Follow low cholesterol diet. Continue current treatment. Lipid profile is good    Benign prostatic hyperplasia with urinary frequency  Symptoms improved. Bladder carcinoma ([address]   Pt had TCC of lateral bladder wall Dx 12/18. Had treatment   Dr Mi Farris seeing patient. Having cystoscopy q 3-6 month    Esophageal dysphagia   Patient has esophageal dysphagia going to the back. ct chest is normal. UGI mild HH and dysmotility . trial of protonix 40 mg daily      Positive colorectal cancer screening using Cologuard test   colon negative. Mediations reviewed with the patient. Continue current medications. Appropriate prescriptions are addressed. After visit summeryprovided. Follow up as directed sooner if needed. Questions answered and patient verbalizes understanding. Call for any problems, questions, or concerns.        Allergies   Allergen Reactions    Antihistamines, Chlorpheniramine-Type      Tachycardia      Pneumovax 23 [Pneumococcal Vac Polyvalent]      Body aches, redness at injection site     Current Outpatient Medications   Medication Sig Dispense Refill    atorvastatin (LIPITOR) 40 MG tablet TAKE ONE TABLET BY MOUTH ONCE DAILY 90 tablet 1    pantoprazole (PROTONIX) 20 MG tablet Take 1 tablet by mouth daily 90 tablet 1    simethicone (MYLICON) 80 MG chewable tablet Take 1 tablet by mouth 3 times daily as needed for Flatulence 90 tablet 1    Probiotic Product (ALIGN PO) Take 1 tablet by mouth daily      levothyroxine (SYNTHROID) 50 MCG tablet Take 1 tablet by mouth Daily 90 tablet 1    amLODIPine (NORVASC) 10 MG tablet Take 1 tablet by mouth daily 90 tablet 1    OMEGA-3 FATTY ACIDS PO Take by mouth daily      aspirin 81 MG tablet Take 1 tablet by mouth daily 30 tablet 5    MULTIPLE VITAMIN PO Take 1 tablet by mouth daily. No current facility-administered medications for this visit. Past Medical History:   Diagnosis Date    Benign prostatic hyperplasia with urinary frequency 2017    Patient is taking [place] combinations    Bladder carcinoma ([address] 2019    Pt had TCC of lateral bladder wall. Had treatment  Dr Lenard Bateman seeing patient.  Bladder tumor 10/15/2018    Ultrasound of the kidneys and bladder on 2018 showed a 2 cm tumor in the bladder. Patient is referred to urologist Dr. Lenard Bateman.  H/O colonoscopy 3/2012    Dr. Blanca Barrientos (hyperlipidemia)     Hypothyroidism      Past Surgical History:   Procedure Laterality Date    APPENDECTOMY  age 24    COLONOSCOPY  2012     DR. Chan    COLONOSCOPY  2021    repeat in 5 yrs per Dr. Paul Quinonez History     Tobacco Use    Smoking status: Former Smoker     Packs/day: 1.00     Years: 35.00     Pack years: 35.00     Types: Cigarettes     Quit date: 1988     Years since quittin.8    Smokeless tobacco: Never Used   Substance Use Topics    Alcohol use: No     Alcohol/week: 0.0 standard drinks       LAB REVIEW:  CBC:   Lab Results   Component Value Date    WBC 6.5 2021    HGB 16.2 2021    HCT 48.5 2021     2021     Lipids:   Lab Results   Component Value Date    HDL 54 2021    LDLDIRECT 51 2021    TRIGLYCFAST 134 2021    CHOLFAST 129 2021     Renal:   Lab Results   Component Value Date    BUN 13 2021    CREATININE 1.1 2021     2021    K 4.2 2021    ALT 25 2021    AST 23 2021    GLUCOSE 102 2021    GLUF 99 2020 PT/INR: No results found for: INR  A1C: No results found for: Jaclyn Frost MD, 10/5/2021 , 11:43 AM

## 2021-10-08 ENCOUNTER — HOSPITAL ENCOUNTER (OUTPATIENT)
Age: 76
Discharge: HOME | End: 2021-10-08
Payer: COMMERCIAL

## 2021-10-08 LAB — PROSTATE SPECIFIC ANTIGEN: 2.45 NG/ML (ref 0–4)

## 2021-10-08 PROCEDURE — 36415 COLL VENOUS BLD VENIPUNCTURE: CPT

## 2021-10-08 PROCEDURE — G0103 PSA SCREENING: HCPCS

## 2021-10-10 NOTE — ASSESSMENT & PLAN NOTE
Pt had TCC of lateral bladder wall Dx 12/18. Had treatment   Dr Nataly Cardoza seeing patient.  Having cystoscopy q 3-6 month

## 2021-10-10 NOTE — ASSESSMENT & PLAN NOTE
Patient is on levothyroxine 50 mcg daily  Patient is stable. Continue current treatment.   TSH is normal

## 2021-12-06 ENCOUNTER — OFFICE VISIT (OUTPATIENT)
Dept: INTERNAL MEDICINE CLINIC | Age: 76
End: 2021-12-06
Payer: COMMERCIAL

## 2021-12-06 VITALS
WEIGHT: 172.6 LBS | SYSTOLIC BLOOD PRESSURE: 122 MMHG | BODY MASS INDEX: 25.49 KG/M2 | RESPIRATION RATE: 16 BRPM | TEMPERATURE: 98 F | OXYGEN SATURATION: 98 % | DIASTOLIC BLOOD PRESSURE: 64 MMHG | HEART RATE: 88 BPM

## 2021-12-06 DIAGNOSIS — R35.0 BENIGN PROSTATIC HYPERPLASIA WITH URINARY FREQUENCY: ICD-10-CM

## 2021-12-06 DIAGNOSIS — E78.00 PURE HYPERCHOLESTEROLEMIA: ICD-10-CM

## 2021-12-06 DIAGNOSIS — N40.1 BENIGN PROSTATIC HYPERPLASIA WITH URINARY FREQUENCY: ICD-10-CM

## 2021-12-06 DIAGNOSIS — E03.9 ACQUIRED HYPOTHYROIDISM: ICD-10-CM

## 2021-12-06 DIAGNOSIS — C67.9 BLADDER CARCINOMA (HCC): ICD-10-CM

## 2021-12-06 DIAGNOSIS — R13.19 ESOPHAGEAL DYSPHAGIA: ICD-10-CM

## 2021-12-06 DIAGNOSIS — I10 ESSENTIAL HYPERTENSION: ICD-10-CM

## 2021-12-06 DIAGNOSIS — R19.5 POSITIVE COLORECTAL CANCER SCREENING USING COLOGUARD TEST: ICD-10-CM

## 2021-12-06 DIAGNOSIS — R30.0 DYSURIA: Primary | ICD-10-CM

## 2021-12-06 LAB
BILIRUBIN, POC: NEGATIVE
BLOOD URINE, POC: NEGATIVE
CLARITY, POC: CLEAR
COLOR, POC: YELLOW
GLUCOSE URINE, POC: NEGATIVE
KETONES, POC: NEGATIVE
LEUKOCYTE EST, POC: NEGATIVE
NITRITE, POC: NEGATIVE
PH, POC: 7.5
PROTEIN, POC: NEGATIVE
SPECIFIC GRAVITY, POC: 1.01
UROBILINOGEN, POC: 0.2

## 2021-12-06 PROCEDURE — 99214 OFFICE O/P EST MOD 30 MIN: CPT | Performed by: INTERNAL MEDICINE

## 2021-12-06 PROCEDURE — 81002 URINALYSIS NONAUTO W/O SCOPE: CPT | Performed by: INTERNAL MEDICINE

## 2021-12-06 RX ORDER — TRIAMCINOLONE ACETONIDE 1 MG/G
CREAM TOPICAL
Qty: 45 G | Refills: 0 | Status: SHIPPED | OUTPATIENT
Start: 2021-12-06 | End: 2022-01-05

## 2021-12-06 RX ORDER — LEVOTHYROXINE SODIUM 0.05 MG/1
50 TABLET ORAL DAILY
Qty: 90 TABLET | Refills: 1 | Status: SHIPPED | OUTPATIENT
Start: 2021-12-06 | End: 2022-09-07 | Stop reason: SDUPTHER

## 2021-12-06 RX ORDER — LEVOTHYROXINE SODIUM 0.05 MG/1
50 TABLET ORAL DAILY
Qty: 90 TABLET | Refills: 1 | Status: CANCELLED | OUTPATIENT
Start: 2021-12-06

## 2021-12-06 NOTE — ASSESSMENT & PLAN NOTE
Patient has esophageal dysphagia going to the back. ct chest is normal. UGI mild HH and dysmotility, On protonix 40 mg daily

## 2021-12-06 NOTE — ASSESSMENT & PLAN NOTE
Patient is on atorvastatin 40 mg daily  Hyperlipidemia is stable. Follow low cholesterol diet. Continue current treatment.

## 2021-12-06 NOTE — PROGRESS NOTES
Domonique Hamilton  Patient's  is 1945  Seen in office on 2021      SUBJECTIVE:  Ludmila goetz 77 y. o.year old male presents today   Chief Complaint   Patient presents with    Hypertension    Rash     both arms    Dysuria     burning with urination    Medication Refill     wants printed rx please     Pt is here for f/u of HTN, has mild dysuria but does not want any thing done  No fever or chills. No headace  Pt has bladder carcinoma and sees Dr Vern Blue  Has rash on the arms both arms . Used home remedies. Calamine lotion. Pt is not taking simethocone any more but using probiotic    Patient has hypertension. Taking medications No headaches, no chest pain, no palpitations and no dizziness. Patient has hyperlipidemia. Taking medications. No abdominal pain, no nausea or vomiting. No myalgias. Pt has hypothyroidism     Taking medications regularly. No side effects noted. Review of Systems      Review of system is normal except as in HPI    OBJECTIVE: /64   Pulse 88   Temp 98 °F (36.7 °C) (Oral)   Resp 16   Wt 172 lb 9.6 oz (78.3 kg)   SpO2 98%   BMI 25.49 kg/m²     Wt Readings from Last 3 Encounters:   21 172 lb 9.6 oz (78.3 kg)   10/05/21 174 lb (78.9 kg)   21 177 lb 6.4 oz (80.5 kg)        Patient was seen taking COVID-19 precautions. Face mask, gloves were used. Patient also wore facemask. GENERAL: - Alert, oriented, pleasant, in no apparent distress. HEENT: - Conjunctiva pink, no scleral icterus. ENT clear. NECK: -Supple. No jugular venous distention noted. No masses felt,  CARDIOVASCULAR: - Normal S1 and S2    PULMONARY: - No respiratory distress. No wheezes or rales. ABDOMEN: - Soft and non-tender,no masses  ororganomegaly. EXTREMITIES: - No cyanosis, clubbing, or significant edema. SKIN: Skin is warm and dry. NEUROLOGICAL: - Cranial nerves II through XII are grossly intact.      Erythematous rash with some papules on both fore arms 09/29/2021    AST 23 09/29/2021    GLUCOSE 102 09/29/2021    GLUF 99 09/02/2020     PT/INR: No results found for: INR  A1C: No results found for: Suzanne Head MD, 12/6/2021 , 11:26 AM

## 2021-12-06 NOTE — ASSESSMENT & PLAN NOTE
Pt had TCC of lateral bladder wall Dx 12/18. Had treatment   Dr Tri Caballero seeing patient. Having cystoscopy q 3-6 month  Dysuria off and on.

## 2021-12-30 ENCOUNTER — HOSPITAL ENCOUNTER (OUTPATIENT)
Age: 76
Discharge: HOME | End: 2021-12-30
Payer: COMMERCIAL

## 2021-12-30 LAB
ALBUMIN SERPL-MCNC: 4.1 GM/DL (ref 3.4–5)
ALP BLD-CCNC: 106 IU/L (ref 40–129)
ALT SERPL-CCNC: 30 U/L (ref 10–40)
ANION GAP SERPL CALCULATED.3IONS-SCNC: 10 MMOL/L (ref 4–16)
AST SERPL-CCNC: 23 IU/L (ref 15–37)
BILIRUB SERPL-MCNC: 0.5 MG/DL (ref 0–1)
BUN BLDV-MCNC: 15 MG/DL (ref 6–23)
CALCIUM SERPL-MCNC: 9.5 MG/DL (ref 8.3–10.6)
CHLORIDE BLD-SCNC: 101 MMOL/L (ref 99–110)
CHOLESTEROL, FASTING: 120 MG/DL
CO2: 26 MMOL/L (ref 21–32)
CREAT SERPL-MCNC: 1 MG/DL (ref 0.9–1.3)
GFR AFRICAN AMERICAN: >60 ML/MIN/1.73M2
GFR NON-AFRICAN AMERICAN: >60 ML/MIN/1.73M2
GLUCOSE BLD-MCNC: 109 MG/DL (ref 70–99)
HDLC SERPL-MCNC: 52 MG/DL
LDL CHOLESTEROL DIRECT: 53 MG/DL
POTASSIUM SERPL-SCNC: 4.2 MMOL/L (ref 3.5–5.1)
SODIUM BLD-SCNC: 137 MMOL/L (ref 135–145)
TOTAL PROTEIN: 7.3 GM/DL (ref 6.4–8.2)
TRIGLYCERIDE, FASTING: 131 MG/DL

## 2021-12-30 PROCEDURE — 80061 LIPID PANEL: CPT

## 2021-12-30 PROCEDURE — 36415 COLL VENOUS BLD VENIPUNCTURE: CPT

## 2021-12-30 PROCEDURE — 80053 COMPREHEN METABOLIC PANEL: CPT

## 2022-01-05 ENCOUNTER — OFFICE VISIT (OUTPATIENT)
Dept: INTERNAL MEDICINE CLINIC | Age: 77
End: 2022-01-05
Payer: COMMERCIAL

## 2022-01-05 VITALS
OXYGEN SATURATION: 98 % | HEART RATE: 88 BPM | RESPIRATION RATE: 16 BRPM | SYSTOLIC BLOOD PRESSURE: 138 MMHG | WEIGHT: 169.6 LBS | BODY MASS INDEX: 25.05 KG/M2 | TEMPERATURE: 98.1 F | DIASTOLIC BLOOD PRESSURE: 64 MMHG

## 2022-01-05 DIAGNOSIS — R35.0 BENIGN PROSTATIC HYPERPLASIA WITH URINARY FREQUENCY: ICD-10-CM

## 2022-01-05 DIAGNOSIS — R14.0 BLOATED ABDOMEN: ICD-10-CM

## 2022-01-05 DIAGNOSIS — E78.00 PURE HYPERCHOLESTEROLEMIA: ICD-10-CM

## 2022-01-05 DIAGNOSIS — J06.9 UPPER RESPIRATORY TRACT INFECTION, UNSPECIFIED TYPE: Primary | ICD-10-CM

## 2022-01-05 DIAGNOSIS — R19.5 POSITIVE COLORECTAL CANCER SCREENING USING COLOGUARD TEST: ICD-10-CM

## 2022-01-05 DIAGNOSIS — I10 ESSENTIAL HYPERTENSION: ICD-10-CM

## 2022-01-05 DIAGNOSIS — N40.1 BENIGN PROSTATIC HYPERPLASIA WITH URINARY FREQUENCY: ICD-10-CM

## 2022-01-05 DIAGNOSIS — R13.19 ESOPHAGEAL DYSPHAGIA: ICD-10-CM

## 2022-01-05 DIAGNOSIS — C67.9 BLADDER CARCINOMA (HCC): ICD-10-CM

## 2022-01-05 DIAGNOSIS — E03.9 ACQUIRED HYPOTHYROIDISM: ICD-10-CM

## 2022-01-05 PROCEDURE — 99214 OFFICE O/P EST MOD 30 MIN: CPT | Performed by: INTERNAL MEDICINE

## 2022-01-05 RX ORDER — AZITHROMYCIN 250 MG/1
TABLET, FILM COATED ORAL
Qty: 1 PACKET | Refills: 0 | Status: SHIPPED | OUTPATIENT
Start: 2022-01-05 | End: 2022-03-02 | Stop reason: ALTCHOICE

## 2022-01-05 RX ORDER — AMLODIPINE BESYLATE AND BENAZEPRIL HYDROCHLORIDE 5; 10 MG/1; MG/1
1 CAPSULE ORAL DAILY
Qty: 90 CAPSULE | Refills: 1 | Status: SHIPPED | OUTPATIENT
Start: 2022-01-05 | End: 2022-04-06 | Stop reason: ALTCHOICE

## 2022-01-05 ASSESSMENT — PATIENT HEALTH QUESTIONNAIRE - PHQ9
2. FEELING DOWN, DEPRESSED OR HOPELESS: 0
SUM OF ALL RESPONSES TO PHQ QUESTIONS 1-9: 0
SUM OF ALL RESPONSES TO PHQ QUESTIONS 1-9: 0
SUM OF ALL RESPONSES TO PHQ9 QUESTIONS 1 & 2: 0
SUM OF ALL RESPONSES TO PHQ QUESTIONS 1-9: 0
SUM OF ALL RESPONSES TO PHQ QUESTIONS 1-9: 0
1. LITTLE INTEREST OR PLEASURE IN DOING THINGS: 0

## 2022-01-05 NOTE — ASSESSMENT & PLAN NOTE
Pt had TCC of lateral bladder wall Dx 12/18. Had treatment   Dr Graciela Echavarria seeing patient.  Having cystoscopy periodically

## 2022-01-05 NOTE — ASSESSMENT & PLAN NOTE
Refer pt to gastroenterologist of his choice. Colonoscopy : 2/2021 : adenoma.  F/u in 5 years   Refer to Dr Sabrina Salas : done

## 2022-01-05 NOTE — PROGRESS NOTES
Yulissa Martin  Patient's  is 1945  Seen in office on 2022      SUBJECTIVE:  Kosta goetz 77 y. o.year old male presents today   Chief Complaint   Patient presents with    3 Month Follow-Up    Cough     symptoms started right after Tana, productive at times, clear sputum    Chest Congestion     Pt is here for f/u of HTN HLD hypothyroidism  Pt is doing well  C/o cough for 10 days  No fever or chills. Taking corcidin HBP and is not helping . No body ache , no loss of taste of smell. Taking medications regularly. No side effects noted. Patient has hypertension. Taking medications No headaches, no chest pain, no palpitations and no dizziness. Patient has hyperlipidemia. Taking medications. No abdominal pain, no nausea or vomiting. No myalgias. Pt has hypothyroidism : Patient is stable. Review of Systems  Review of system is normal except as in HPI    OBJECTIVE: /64   Pulse 88   Temp 98.1 °F (36.7 °C) (Temporal)   Resp 16   Wt 169 lb 9.6 oz (76.9 kg)   SpO2 98%   BMI 25.05 kg/m²     Wt Readings from Last 3 Encounters:   22 169 lb 9.6 oz (76.9 kg)   21 172 lb 9.6 oz (78.3 kg)   10/05/21 174 lb (78.9 kg)    Patient was seen taking COVID-19 precautions. Face mask, gloves were used. Patient also wore facemask. GENERAL:  Alert, oriented, pleasant, in no apparent distress. HEENT:  Conjunctiva pink, no scleral icterus. ENT clear. NECK: Supple. No jugular venous distention noted. No masses felt,  CARDIOVASCULAR:  Normal S1 and S2    PULMONARY:  No respiratory distress. No wheezes or rales. ABDOMEN:  Soft and non-tender,no masses  ororganomegaly. EXTREMITIES:  No cyanosis, clubbing, or significant edema. SKIN: Skin is warm and dry. NEUROLOGICAL:  Cranial nerves II through XII are grossly intact. IMPRESSION:    Encounter Diagnoses   Name Primary?     Upper respiratory tract infection, unspecified type Yes    Essential hypertension     Bladder carcinoma ([facility] [address]     Pure hypercholesterolemia     Acquired hypothyroidism     Benign prostatic hyperplasia with urinary frequency     Esophageal dysphagia     Positive colorectal cancer screening using Cologuard test     Bloated abdomen        ASSESSMENT/PLAN:    Essential hypertension    Patient is taking amlodipine  Hypertension in control. Follow low salt diet. Continue current treatment. Pure hypercholesterolemia    Patient is on atorvastatin 40 mg daily  Hyperlipidemia is stable. Follow low cholesterol diet. Continue current treatment. Acquired hypothyroidism    Patient is on levothyroxine 50 mcg daily  Patient is stable. Continue current treatment. Benign prostatic hyperplasia : Patient is stable. Bladder carcinoma ([facility] [address]    Pt had TCC of lateral bladder wall Dx 12/18. Had treatment   Dr Campbell Persons seeing patient. Having cystoscopy periodically        Positive colorectal cancer screening using Cologuard test    Refer pt to gastroenterologist of his choice. Colonoscopy : 2/2021 : adenoma. F/u in 5 years   Refer to Dr Steiner Bars : done       Pt has URI and cough   Will give Z-elvira  If symptoms do not improve call. Mediations reviewed with the patient. Continue current medications. Appropriate prescriptions are addressed. After visit summeryprovided. Follow up as directed sooner if needed. Questions answered and patient verbalizes understanding. Call for any problems, questions, or concerns.        Allergies   Allergen Reactions    Antihistamines, Chlorpheniramine-Type      Tachycardia      Pneumovax 23 [Pneumococcal Vac Polyvalent]      Body aches, redness at injection site     Current Outpatient Medications   Medication Sig Dispense Refill    levothyroxine (SYNTHROID) 50 MCG tablet Take 1 tablet by mouth Daily 90 tablet 1    atorvastatin (LIPITOR) 40 MG tablet TAKE ONE TABLET BY MOUTH ONCE DAILY 90 tablet 1    pantoprazole (PROTONIX) 20 MG tablet Take 1 tablet by GLUCOSE 109 12/30/2021    GLUF 99 09/02/2020     PT/INR: No results found for: INR  A1C: No results found for: Alf Bright MD, 1/5/2022 , 11:11 AM

## 2022-02-14 ENCOUNTER — OFFICE VISIT (OUTPATIENT)
Dept: INTERNAL MEDICINE CLINIC | Age: 77
End: 2022-02-14
Payer: COMMERCIAL

## 2022-02-14 VITALS — HEART RATE: 86 BPM | TEMPERATURE: 98.1 F | OXYGEN SATURATION: 98 %

## 2022-02-14 DIAGNOSIS — J40 BRONCHITIS: Primary | ICD-10-CM

## 2022-02-14 PROCEDURE — 99213 OFFICE O/P EST LOW 20 MIN: CPT | Performed by: PHYSICIAN ASSISTANT

## 2022-02-14 RX ORDER — GUAIFENESIN 600 MG/1
600 TABLET, EXTENDED RELEASE ORAL 2 TIMES DAILY
Qty: 30 TABLET | Refills: 0 | Status: SHIPPED | OUTPATIENT
Start: 2022-02-14 | End: 2022-03-01

## 2022-02-14 RX ORDER — METHYLPREDNISOLONE 4 MG/1
TABLET ORAL
Qty: 1 KIT | Refills: 0 | Status: SHIPPED | OUTPATIENT
Start: 2022-02-14 | End: 2022-02-20

## 2022-02-14 RX ORDER — ALBUTEROL SULFATE 90 UG/1
2 AEROSOL, METERED RESPIRATORY (INHALATION) 4 TIMES DAILY PRN
Qty: 54 G | Refills: 1 | Status: SHIPPED | OUTPATIENT
Start: 2022-02-14

## 2022-02-14 ASSESSMENT — ENCOUNTER SYMPTOMS
EYE DISCHARGE: 0
SHORTNESS OF BREATH: 1
RHINORRHEA: 0
DIARRHEA: 0
BACK PAIN: 0
EYE PAIN: 0
BLOOD IN STOOL: 0
VOMITING: 0
WHEEZING: 0
ABDOMINAL PAIN: 0
CONSTIPATION: 0
SORE THROAT: 0
COLOR CHANGE: 0
PHOTOPHOBIA: 0
CHEST TIGHTNESS: 0
COUGH: 1
EYE REDNESS: 0
NAUSEA: 0

## 2022-02-14 NOTE — PROGRESS NOTES
Gladys Hawkins (:  1945) is a 77 y.o. male,Established patient, here for evaluation of the following chief complaint(s):    Congestion      SUBJECTIVE/OBJECTIVE:  HPI  Gladys Hawkins is a pleasant 77 y.o. male presenting to clinic today for cough. Patient was seen in clinic on  for URI/cough; patient was prescribed azithromycin at that time; patient reports that since congestion and other URI symptoms have mostly resolved however patient has continued with a dry cough and chest congestion; reporting that he believes his symptoms have \"settled in my chest.\"  Patient reports symptoms are worse in the morning time and worse with exertion; reports mostly dry cough with occasional clear/white mucus production. Patient denies fevers, sweats or other symptoms. Current Outpatient Medications   Medication Sig Dispense Refill    methylPREDNISolone (MEDROL DOSEPACK) 4 MG tablet Take by mouth. 1 kit 0    albuterol sulfate HFA (VENTOLIN HFA) 108 (90 Base) MCG/ACT inhaler Inhale 2 puffs into the lungs 4 times daily as needed for Wheezing 54 g 1    guaiFENesin (MUCINEX) 600 MG extended release tablet Take 1 tablet by mouth 2 times daily for 15 days 30 tablet 0    amLODIPine-benazepril (LOTREL) 5-10 MG per capsule Take 1 capsule by mouth daily 90 capsule 1    azithromycin (ZITHROMAX) 250 MG tablet Take 2 tabs (500 mg) on Day 1, and take 1 tab (250 mg) on days 2 through 5. 1 packet 0    levothyroxine (SYNTHROID) 50 MCG tablet Take 1 tablet by mouth Daily 90 tablet 1    atorvastatin (LIPITOR) 40 MG tablet TAKE ONE TABLET BY MOUTH ONCE DAILY 90 tablet 1    pantoprazole (PROTONIX) 20 MG tablet Take 1 tablet by mouth daily 90 tablet 1    Probiotic Product (ALIGN PO) Take 1 tablet by mouth daily      OMEGA-3 FATTY ACIDS PO Take by mouth daily      aspirin 81 MG tablet Take 1 tablet by mouth daily 30 tablet 5    MULTIPLE VITAMIN PO Take 1 tablet by mouth daily.        No current facility-administered medications for this visit. Review of Systems   Constitutional: Negative for appetite change, chills, fatigue and fever. HENT: Positive for congestion. Negative for ear pain, hearing loss, rhinorrhea and sore throat. Eyes: Negative for photophobia, pain, discharge and redness. Respiratory: Positive for cough and shortness of breath ( on exertion). Negative for chest tightness and wheezing. Cardiovascular: Negative for chest pain, palpitations and leg swelling. Gastrointestinal: Negative for abdominal pain, blood in stool, constipation, diarrhea, nausea and vomiting. Endocrine: Negative for polyuria. Genitourinary: Negative for difficulty urinating, dysuria, flank pain, frequency, hematuria and urgency. Musculoskeletal: Negative for arthralgias, back pain, gait problem and joint swelling. Skin: Negative for color change and rash. Neurological: Negative for dizziness, syncope, weakness, light-headedness and headaches. Hematological: Negative for adenopathy. Psychiatric/Behavioral: Negative for agitation, behavioral problems and suicidal ideas. The patient is not nervous/anxious. Physical Exam  HENT:      Head: Normocephalic and atraumatic. Right Ear: External ear normal.      Left Ear: External ear normal.   Cardiovascular:      Rate and Rhythm: Regular rhythm. Pulses: Normal pulses. Pulmonary:      Effort: No respiratory distress. Musculoskeletal:         General: Normal range of motion. Skin:     General: Skin is warm and dry. Neurological:      General: No focal deficit present. Mental Status: He is alert and oriented to person, place, and time. Mental status is at baseline. Psychiatric:         Behavior: Behavior normal.         ASSESSMENT/PLAN:  1.  Bronchitis   -Symptoms and history consistent with postinfectious lingering bronchitis; will treat with steroid, albuterol inhaler, Mucinex; reviewed proper use, monitoring, side effects of medication sent in; patient was initiated on benazepril approximately 2 months ago; unclear if this is contributory/causing any coughing symptom as side effect; advised patient that if symptoms persist beyond completion of medications, reach out for consideration of blood pressure medication adjustment. Return to clinic if symptoms worsen or change etc.  -     methylPREDNISolone (MEDROL DOSEPACK) 4 MG tablet; Take by mouth., Disp-1 kit, R-0Normal  -     albuterol sulfate HFA (VENTOLIN HFA) 108 (90 Base) MCG/ACT inhaler; Inhale 2 puffs into the lungs 4 times daily as needed for Wheezing, Disp-54 g, R-1Normal  -     guaiFENesin (MUCINEX) 600 MG extended release tablet; Take 1 tablet by mouth 2 times daily for 15 days, Disp-30 tablet, R-0Normal      Return in about 2 weeks (around 2/28/2022), or if symptoms worsen or fail to improve, for Follow Up. An electronic signature was used to authenticate this note.     --OLIVER Bermeo

## 2022-02-28 ENCOUNTER — TELEPHONE (OUTPATIENT)
Dept: INTERNAL MEDICINE CLINIC | Age: 77
End: 2022-02-28

## 2022-02-28 ENCOUNTER — NURSE TRIAGE (OUTPATIENT)
Dept: OTHER | Facility: CLINIC | Age: 77
End: 2022-02-28

## 2022-02-28 NOTE — TELEPHONE ENCOUNTER
Spoke with patient and made appt for tomorrow. inst to continue his mucinex. Verbal understanding returned.

## 2022-02-28 NOTE — TELEPHONE ENCOUNTER
Received call from Lewis ascencio at [facility], caller not on line. Complaint: Shortness of breath (seen on 2/14)    Practice Name: [facility]     Caller's telephone number verified as [phone]    Connected with caller via phone, please see below triage    Received call from Lewis ascencio at [facility] with Red Flag Complaint. Subjective: Caller states \"He told me he thought I had bronchitis. I'm not sick. I'm just congested. I was on steroids that I was on for a week. They seemed to be working a little better. It's not worse, but not much better. No fever. Just a little cough. I can't get nothing up. They put me on Mucinex - I have been taking that - it's not helping. I had cataract surgery on the third they canceled that. \"     Current Symptoms:   Chest congestion with slight cough (clear phlegm), but pt reporting difficulty \"getting the phlegm to come up - the Mucinex doesn't seem to be making a huge difference\" - denies shortness of breath or chest pain     Normal appetite and adequate fluid intake - denies nausea, vomiting, or diarrhea    Onset: 1 month ago; unchanged    Associated Symptoms: NA    Pain Severity: Denies pain    Temperature: Denies fever     What has been tried:     LMP: NA Pregnant: NA    Recommended disposition: See PCP within 3 Days 2nd level triage with Loli Grider MA at [address] reporting pt already has an appointment for Thursday this week as she has already spoken with pt. Loli Grider notified pt is on last dose of Mucinex and will send message to provider for refill. Care advice provided, patient verbalizes understanding; denies any other questions or concerns; instructed to call back for any new or worsening symptoms. Pt confirmed Thursday appointment with Shital Damian. PT advised to call back if symptoms worsen or having any shortness of breath or chest pain      Attention Provider: Thank you for allowing me to participate in the care of your patient.   The patient was connected to triage in response to information provided to the ECC/PSC. Please do not respond through this encounter as the response is not directed to a shared pool.     Reason for Disposition   Cough has been present for > 3 weeks    Protocols used: COUGH-ADULT-[place]

## 2022-02-28 NOTE — TELEPHONE ENCOUNTER
appt made for Thursday but patient states he will be out of musinex and steroids. He is still having congestion. He is concerned about turning into pneumonia.

## 2022-03-02 ENCOUNTER — OFFICE VISIT (OUTPATIENT)
Dept: INTERNAL MEDICINE CLINIC | Age: 77
End: 2022-03-02
Payer: COMMERCIAL

## 2022-03-02 ENCOUNTER — HOSPITAL ENCOUNTER (OUTPATIENT)
Age: 77
Discharge: HOME | End: 2022-03-02
Payer: COMMERCIAL

## 2022-03-02 ENCOUNTER — HOSPITAL ENCOUNTER (OUTPATIENT)
Dept: GENERAL RADIOLOGY | Age: 77
Discharge: HOME | End: 2022-03-02
Payer: COMMERCIAL

## 2022-03-02 VITALS
SYSTOLIC BLOOD PRESSURE: 130 MMHG | DIASTOLIC BLOOD PRESSURE: 72 MMHG | TEMPERATURE: 97.2 F | BODY MASS INDEX: 25.16 KG/M2 | WEIGHT: 170.4 LBS | OXYGEN SATURATION: 97 % | HEART RATE: 72 BPM | RESPIRATION RATE: 16 BRPM

## 2022-03-02 DIAGNOSIS — R09.89 CHEST CONGESTION: ICD-10-CM

## 2022-03-02 PROCEDURE — 99213 OFFICE O/P EST LOW 20 MIN: CPT | Performed by: INTERNAL MEDICINE

## 2022-03-02 PROCEDURE — 71046 X-RAY EXAM CHEST 2 VIEWS: CPT

## 2022-03-02 RX ORDER — GUAIFENESIN 600 MG/1
1200 TABLET, EXTENDED RELEASE ORAL 2 TIMES DAILY
COMMUNITY
End: 2022-04-06 | Stop reason: ALTCHOICE

## 2022-03-02 NOTE — PROGRESS NOTES
Ronald Spear  Patient's  is 1945  Seen in office on 3/2/2022      SUBJECTIVE:  Kunal Lucio sofy 77 y. o.year old male presents today   Chief Complaint   Patient presents with    Cough     chest congestion    Follow-up     was seen in walk in clinic 2022     Pt states he feels he has chest congestion  Feels nothing coming out when he coughs  No fever or chills  He saw PA at walk-in clinic. Was given Mucinex and Zithromax. Patient is taking Mucinex 600 mg twice a day. Is not able to bring up any sputum. No fever or chills. Taking medications regularly. No side effects noted. Review of Systems    OBJECTIVE: /72   Pulse 72   Temp 97.2 °F (36.2 °C) (Temporal)   Resp 16   Wt 170 lb 6.4 oz (77.3 kg)   SpO2 97%   BMI 25.16 kg/m²     Wt Readings from Last 3 Encounters:   22 170 lb 6.4 oz (77.3 kg)   22 169 lb 9.6 oz (76.9 kg)   21 172 lb 9.6 oz (78.3 kg)      Patient was seen taking COVID-19 precautions. Face mask, gloves were used. Patient also wore facemask. GENERAL:  Alert, oriented, pleasant, in no apparent distress. HEENT:  Conjunctiva pink, no scleral icterus. ENT clear. NECK: Supple. No jugular venous distention noted. No masses felt,  CARDIOVASCULAR:  Normal S1 and S2    PULMONARY:  No respiratory distress. No wheezes or rales. ABDOMEN:  Soft and non-tender,no masses  ororganomegaly. EXTREMITIES:  No cyanosis, clubbing, or significant edema. SKIN: Skin is warm and dry. NEUROLOGICAL:  Cranial nerves II through XII are grossly intact. IMPRESSION:    Encounter Diagnoses   Name Primary?  Chest congestion        ASSESSMENT/PLAN:    Chest x ray 2+ every 6 hours  Takes mucinex 1200 mg bid  Use albuterol inhaler prn 2 puffs every 6 hours  Call back after the results. Mediations reviewed with the patient. Continue current medications. Appropriate prescriptions are addressed. After visit summeryprovided.   Follow up as directed sooner if needed. Questions answered and patient verbalizes understanding. Call for any problems, questions, or concerns. Allergies   Allergen Reactions    Antihistamines, Chlorpheniramine-Type      Tachycardia      Pneumovax 23 [Pneumococcal Vac Polyvalent]      Body aches, redness at injection site     Current Outpatient Medications   Medication Sig Dispense Refill    guaiFENesin (MUCINEX) 600 MG extended release tablet Take 1,200 mg by mouth 2 times daily      amLODIPine-benazepril (LOTREL) 5-10 MG per capsule Take 1 capsule by mouth daily 90 capsule 1    levothyroxine (SYNTHROID) 50 MCG tablet Take 1 tablet by mouth Daily 90 tablet 1    atorvastatin (LIPITOR) 40 MG tablet TAKE ONE TABLET BY MOUTH ONCE DAILY 90 tablet 1    pantoprazole (PROTONIX) 20 MG tablet Take 1 tablet by mouth daily 90 tablet 1    Probiotic Product (ALIGN PO) Take 1 tablet by mouth daily      OMEGA-3 FATTY ACIDS PO Take by mouth daily      aspirin 81 MG tablet Take 1 tablet by mouth daily 30 tablet 5    MULTIPLE VITAMIN PO Take 1 tablet by mouth daily.  albuterol sulfate HFA (VENTOLIN HFA) 108 (90 Base) MCG/ACT inhaler Inhale 2 puffs into the lungs 4 times daily as needed for Wheezing (Patient not taking: Reported on 3/2/2022) 54 g 1     No current facility-administered medications for this visit. Past Medical History:   Diagnosis Date    Benign prostatic hyperplasia with urinary frequency 11/9/2017    Patient is taking Saw Palmetto combinations    Bladder carcinoma ([facility] [address] 2/6/2019    Pt had TCC of lateral bladder wall. Had treatment  Dr Marita Carrera seeing patient.  Bladder tumor 10/15/2018    Ultrasound of the kidneys and bladder on 9/11/2018 showed a 2 cm tumor in the bladder. Patient is referred to urologist Dr. Marita Carrera.     H/O colonoscopy 3/2012    Dr. Rosy Li (hyperlipidemia)     Hypothyroidism      Past Surgical History:   Procedure Laterality Date    APPENDECTOMY  age 24    COLONOSCOPY  03/28/2012     COLONOSCOPY  2021    repeat in 5 yrs per Dr. Maribel Sierra History     Tobacco Use    Smoking status: Former Smoker     Packs/day: 1.00     Years: 35.00     Pack years: 35.00     Types: Cigarettes     Quit date: 1988     Years since quittin.2    Smokeless tobacco: Never Used   Substance Use Topics    Alcohol use: No     Alcohol/week: 0.0 standard drinks       LAB REVIEW:  CBC:   Lab Results   Component Value Date    WBC 6.5 2021    HGB 16.2 2021    HCT 48.5 2021     2021     Lipids:   Lab Results   Component Value Date    HDL 52 2021    LDLDIRECT 53 2021    TRIGLYCFAST 131 2021    CHOLFAST 120 2021     Renal:   Lab Results   Component Value Date    BUN 15 2021    CREATININE 1.0 2021     2021    K 4.2 2021    ALT 30 2021    AST 23 2021    GLUCOSE 109 2021    GLUF 99 2020     PT/INR: No results found for: INR  A1C: No results found for: Zacarias Mays MD, 3/2/2022 , 3:04 PM

## 2022-03-03 ENCOUNTER — TELEPHONE (OUTPATIENT)
Dept: INTERNAL MEDICINE CLINIC | Age: 77
End: 2022-03-03

## 2022-03-03 NOTE — TELEPHONE ENCOUNTER
Patient contacted office today to see if the results from his imaging had been reviewed. He states he was just worried about it.

## 2022-03-04 ENCOUNTER — TELEPHONE (OUTPATIENT)
Dept: INTERNAL MEDICINE CLINIC | Age: 77
End: 2022-03-04

## 2022-03-04 RX ORDER — DOXYCYCLINE HYCLATE 100 MG
100 TABLET ORAL 2 TIMES DAILY
Qty: 14 TABLET | Refills: 0 | Status: SHIPPED | OUTPATIENT
Start: 2022-03-04 | End: 2022-03-11

## 2022-03-04 NOTE — TELEPHONE ENCOUNTER
Patient called stated that you were going to call in a medication for him due to congestion in his chest - he has taken mucinex, prednisone and antibiotic and he is still having a lot of congestion

## 2022-04-06 ENCOUNTER — OFFICE VISIT (OUTPATIENT)
Dept: INTERNAL MEDICINE CLINIC | Age: 77
End: 2022-04-06
Payer: COMMERCIAL

## 2022-04-06 VITALS
DIASTOLIC BLOOD PRESSURE: 64 MMHG | OXYGEN SATURATION: 97 % | SYSTOLIC BLOOD PRESSURE: 124 MMHG | HEART RATE: 68 BPM | RESPIRATION RATE: 16 BRPM | BODY MASS INDEX: 24.96 KG/M2 | WEIGHT: 169 LBS | TEMPERATURE: 97.6 F

## 2022-04-06 DIAGNOSIS — E03.9 ACQUIRED HYPOTHYROIDISM: Primary | ICD-10-CM

## 2022-04-06 DIAGNOSIS — E78.00 PURE HYPERCHOLESTEROLEMIA: ICD-10-CM

## 2022-04-06 DIAGNOSIS — R13.19 ESOPHAGEAL DYSPHAGIA: ICD-10-CM

## 2022-04-06 DIAGNOSIS — R35.0 BENIGN PROSTATIC HYPERPLASIA WITH URINARY FREQUENCY: ICD-10-CM

## 2022-04-06 DIAGNOSIS — R19.5 POSITIVE COLORECTAL CANCER SCREENING USING COLOGUARD TEST: ICD-10-CM

## 2022-04-06 DIAGNOSIS — I10 ESSENTIAL HYPERTENSION: ICD-10-CM

## 2022-04-06 DIAGNOSIS — N40.1 BENIGN PROSTATIC HYPERPLASIA WITH URINARY FREQUENCY: ICD-10-CM

## 2022-04-06 DIAGNOSIS — C67.9 BLADDER CARCINOMA (HCC): ICD-10-CM

## 2022-04-06 PROCEDURE — 99214 OFFICE O/P EST MOD 30 MIN: CPT | Performed by: INTERNAL MEDICINE

## 2022-04-06 RX ORDER — ATORVASTATIN CALCIUM 40 MG/1
TABLET, FILM COATED ORAL
Qty: 90 TABLET | Refills: 1 | Status: SHIPPED | OUTPATIENT
Start: 2022-04-06 | End: 2022-10-05 | Stop reason: SDUPTHER

## 2022-04-06 RX ORDER — PANTOPRAZOLE SODIUM 20 MG/1
20 TABLET, DELAYED RELEASE ORAL DAILY
Qty: 90 TABLET | Refills: 1 | Status: SHIPPED | OUTPATIENT
Start: 2022-04-06

## 2022-04-06 RX ORDER — PREDNISOLONE ACETATE 10 MG/ML
SUSPENSION/ DROPS OPHTHALMIC
COMMUNITY
Start: 2022-04-02 | End: 2022-07-05

## 2022-04-06 RX ORDER — VALSARTAN 80 MG/1
80 TABLET ORAL DAILY
Qty: 30 TABLET | Refills: 1 | Status: SHIPPED | OUTPATIENT
Start: 2022-04-06 | End: 2022-04-13 | Stop reason: SDUPTHER

## 2022-04-06 RX ORDER — OFLOXACIN 3 MG/ML
SOLUTION/ DROPS OPHTHALMIC
COMMUNITY
Start: 2022-04-02 | End: 2022-07-05

## 2022-04-06 RX ORDER — KETOROLAC TROMETHAMINE 5 MG/ML
SOLUTION OPHTHALMIC
COMMUNITY
Start: 2022-04-02 | End: 2022-07-05

## 2022-04-06 RX ORDER — AMLODIPINE BESYLATE 5 MG/1
5 TABLET ORAL DAILY
Qty: 30 TABLET | Refills: 1 | Status: SHIPPED | OUTPATIENT
Start: 2022-04-06 | End: 2022-05-05 | Stop reason: SDUPTHER

## 2022-04-06 ASSESSMENT — ENCOUNTER SYMPTOMS
COUGH: 1
WHEEZING: 0
SHORTNESS OF BREATH: 0
ALLERGIC/IMMUNOLOGIC NEGATIVE: 1
EYES NEGATIVE: 1

## 2022-04-06 NOTE — PROGRESS NOTES
Yannick Jerome  Patient's  is 1945  Seen in office on 2022      SUBJECTIVE:  Bonifacio goetz 77 y. o.year old male presents today   Chief Complaint   Patient presents with    3 Month Follow-Up    Other     had cataract removed from left eye and on 2022 right eye cataract will be removed   Patient is here for follow-up of hypertension, hyperlipidemia, hypothyroidism    Pt states he has some mild cough and slight congestion. Patient states he has a tickle in the throat and feels some globus feeling in the throat. He tries to clear his throat. Pt had chest x ray showed old granulomatous disease  No fever or chills  No dysphagia  Patient has hypertension and is taking ACE inhibitor Lotensin in Lotrel. Patient denies any headache or dizziness. No pedal edema  Patient has hyperlipidemia and is on medications. No myalgias  Denies any fatigue    Patient gives a history of cataract surgery of the left eye on 2022. Is going to have the left one in a week or so. He has an appointment with urologist also in few days      Taking medications regularly. No side effects noted. Review of Systems   Constitutional: Negative. HENT: Negative. Eyes: Negative. Respiratory: Positive for cough. Negative for shortness of breath and wheezing. Cardiovascular: Negative. Endocrine: Negative. Genitourinary: Negative. Musculoskeletal: Negative. Skin: Negative. Allergic/Immunologic: Negative. Psychiatric/Behavioral: Negative. OBJECTIVE: /64   Pulse 68   Temp 97.6 °F (36.4 °C) (Temporal)   Resp 16   Wt 169 lb (76.7 kg)   SpO2 97%   BMI 24.96 kg/m²     Wt Readings from Last 3 Encounters:   22 169 lb (76.7 kg)   22 170 lb 6.4 oz (77.3 kg)   22 169 lb 9.6 oz (76.9 kg)      Patient was seen taking COVID-19 precautions. Face mask, gloves were used. Patient also wore facemask. GENERAL: - Alert, oriented, pleasant, in no apparent distress.     HEENT: - Conjunctiva pink, no scleral icterus. ENT clear. NECK: -Supple. No jugular venous distention noted. No masses felt,  CARDIOVASCULAR: - Normal S1 and S2    PULMONARY: - No respiratory distress. No wheezes or rales. ABDOMEN: - Soft and non-tender,no masses  ororganomegaly. EXTREMITIES: - No cyanosis, clubbing, or significant edema. SKIN: Skin is warm and dry. NEUROLOGICAL: - Cranial nerves II through XII are grossly intact. IMPRESSION:    Encounter Diagnoses   Name Primary?  Acquired hypothyroidism Yes    Essential hypertension     Pure hypercholesterolemia     Bladder carcinoma (HCC)     Benign prostatic hyperplasia with urinary frequency     Esophageal dysphagia     Positive colorectal cancer screening using Cologuard test        ASSESSMENT/PLAN:    Essential hypertension   Patient was taking Lotrel for hypertension  Pt has cough and tickle in the throat  D/c lotrel  Start on diovan and amlodipine   Discussed with patient will get a repeat CT scan of this but he does not want at this time. Acquired hypothyroidism   . Patient has hypothyroidism and is taking levothyroxine. Continue the same    Pure hypercholesterolemia    Patient is on atorvastatin 40 mg daily  Hyperlipidemia is stable. Follow low cholesterol diet. Continue current treatment. Benign prostatic hyperplasia with urinary frequency. Asymptomatic now       Bladder carcinoma ([address]    Pt had TCC of lateral bladder wall Dx 12/18. Had treatment   Dr Cason Letters seeing patient. Having cystoscopy periodically. Is scheduled for another cystoscopy next week    Esophageal dysphagia    patient has no dysphagia. Taking Protonix and that helps. Patient has dysmotility of esophagus. Mediations reviewed with the patient. Continue current medications. Appropriate prescriptions are addressed. After visit summeryprovided. Follow up as directed sooner if needed. Questions answered and patient verbalizes understanding.  Call for any problems, questions, or concerns. Allergies   Allergen Reactions    Antihistamines, Chlorpheniramine-Type      Tachycardia      Pneumovax 23 [Pneumococcal Vac Polyvalent]      Body aches, redness at injection site     Current Outpatient Medications   Medication Sig Dispense Refill    pantoprazole (PROTONIX) 20 MG tablet Take 1 tablet by mouth daily 90 tablet 1    atorvastatin (LIPITOR) 40 MG tablet TAKE ONE TABLET BY MOUTH ONCE DAILY 90 tablet 1    albuterol sulfate HFA (VENTOLIN HFA) 108 (90 Base) MCG/ACT inhaler Inhale 2 puffs into the lungs 4 times daily as needed for Wheezing 54 g 1    amLODIPine-benazepril (LOTREL) 5-10 MG per capsule Take 1 capsule by mouth daily 90 capsule 1    levothyroxine (SYNTHROID) 50 MCG tablet Take 1 tablet by mouth Daily 90 tablet 1    Probiotic Product (ALIGN PO) Take 1 tablet by mouth daily      OMEGA-3 FATTY ACIDS PO Take by mouth daily      aspirin 81 MG tablet Take 1 tablet by mouth daily 30 tablet 5    MULTIPLE VITAMIN PO Take 1 tablet by mouth daily.  prednisoLONE acetate (PRED FORTE) 1 % ophthalmic suspension INSTILL 1 DROP INTO SURGICAL EYE 4 TIMES DAILY (START AFTER PATCH IS REMOVED AFTER SURGERY)      ofloxacin (OCUFLOX) 0.3 % solution INSTILL 1 DROP 4 TIMES DAILY IN SURGICAL EYE, START ONE DAY BEFORE SURGERY THEN CONTINUE AFTER SURGERY AS DIRECTED      ketorolac (ACULAR) 0.5 % ophthalmic solution INSTILL 1 DROP IN SURGICAL EYE 4 TIMES DAILY (START ONE DAY BEFORE SURGERY. CONTINUE AFTER SURGERY AS DIRECTED)       No current facility-administered medications for this visit. Past Medical History:   Diagnosis Date    Benign prostatic hyperplasia with urinary frequency 11/9/2017    Patient is taking Saw Palmetto combinations    Bladder carcinoma ([facility] [address] 2/6/2019    Pt had TCC of lateral bladder wall. Had treatment  Dr Efe Cristobal seeing patient.      Bladder tumor 10/15/2018    Ultrasound of the kidneys and bladder on 9/11/2018 showed a 2 cm tumor in the bladder. Patient is referred to urologist Dr. Amanda Severino.  H/O colonoscopy 3/2012    Dr. Kenisha Perla (hyperlipidemia)     Hypothyroidism      Past Surgical History:   Procedure Laterality Date    APPENDECTOMY  age 24    CATARACT REMOVAL Left 2022    DR Ant Olvias    COLONOSCOPY  2012     DR. Chan    COLONOSCOPY  2021    repeat in 5 yrs per Dr. Estephania Frye History     Tobacco Use    Smoking status: Former Smoker     Packs/day: 1.00     Years: 35.00     Pack years: 35.00     Types: Cigarettes     Quit date: 1988     Years since quittin.3    Smokeless tobacco: Never Used   Substance Use Topics    Alcohol use: No     Alcohol/week: 0.0 standard drinks       LAB REVIEW:  CBC:   Lab Results   Component Value Date    WBC 6.5 2021    HGB 16.2 2021    HCT 48.5 2021     2021     Lipids:   Lab Results   Component Value Date    HDL 52 2021    LDLDIRECT 53 2021    TRIGLYCFAST 131 2021    CHOLFAST 120 2021     Renal:   Lab Results   Component Value Date    BUN 15 2021    CREATININE 1.0 2021     2021    K 4.2 2021    ALT 30 2021    AST 23 2021    GLUCOSE 109 2021    GLUF 99 2020     PT/INR: No results found for: INR  A1C: No results found for: David Mitchell MD, 2022 , 11:55 AM

## 2022-04-06 NOTE — ASSESSMENT & PLAN NOTE
Patient was taking Lotrel for hypertension  Pt has cough and tickle in the throat  D/c lotrel  Start on diovan and amlodipine

## 2022-04-07 NOTE — ASSESSMENT & PLAN NOTE
Pt had TCC of lateral bladder wall Dx 12/18. Had treatment   Dr Anibal Olmstead seeing patient.  Having cystoscopy periodically

## 2022-04-13 RX ORDER — VALSARTAN 80 MG/1
80 TABLET ORAL DAILY
Qty: 90 TABLET | Refills: 1 | Status: SHIPPED | OUTPATIENT
Start: 2022-04-13 | End: 2022-10-05 | Stop reason: SDUPTHER

## 2022-05-05 ENCOUNTER — OFFICE VISIT (OUTPATIENT)
Dept: INTERNAL MEDICINE CLINIC | Age: 77
End: 2022-05-05
Payer: COMMERCIAL

## 2022-05-05 VITALS
RESPIRATION RATE: 16 BRPM | HEART RATE: 72 BPM | DIASTOLIC BLOOD PRESSURE: 62 MMHG | WEIGHT: 169.2 LBS | SYSTOLIC BLOOD PRESSURE: 130 MMHG | BODY MASS INDEX: 24.99 KG/M2 | OXYGEN SATURATION: 99 % | TEMPERATURE: 97 F

## 2022-05-05 DIAGNOSIS — I10 ESSENTIAL HYPERTENSION: ICD-10-CM

## 2022-05-05 DIAGNOSIS — R05.9 COUGH: Primary | ICD-10-CM

## 2022-05-05 DIAGNOSIS — E03.9 ACQUIRED HYPOTHYROIDISM: ICD-10-CM

## 2022-05-05 DIAGNOSIS — E78.00 PURE HYPERCHOLESTEROLEMIA: ICD-10-CM

## 2022-05-05 PROCEDURE — 99213 OFFICE O/P EST LOW 20 MIN: CPT | Performed by: INTERNAL MEDICINE

## 2022-05-05 RX ORDER — AMLODIPINE BESYLATE 5 MG/1
5 TABLET ORAL DAILY
Qty: 30 TABLET | Refills: 5 | Status: SHIPPED | OUTPATIENT
Start: 2022-05-05 | End: 2022-10-05 | Stop reason: SDUPTHER

## 2022-05-05 RX ORDER — PREDNISONE 10 MG/1
10 TABLET ORAL DAILY
Qty: 10 TABLET | Refills: 0 | Status: SHIPPED | OUTPATIENT
Start: 2022-05-05 | End: 2022-05-15

## 2022-05-05 RX ORDER — TRIAMCINOLONE ACETONIDE 1 MG/G
CREAM TOPICAL
Qty: 45 G | Refills: 0 | Status: SHIPPED | OUTPATIENT
Start: 2022-05-05 | End: 2022-07-05 | Stop reason: ALTCHOICE

## 2022-05-05 ASSESSMENT — ENCOUNTER SYMPTOMS
WHEEZING: 0
SHORTNESS OF BREATH: 0
COUGH: 0
CHOKING: 0
CHEST TIGHTNESS: 0

## 2022-05-05 NOTE — PROGRESS NOTES
Shu Taylor  Patient's  is 1945  Seen in office on 2022      SUBJECTIVE:  Mayra goetz 77 y. o.year old male presents today   Chief Complaint   Patient presents with    1 Month Follow-Up     chest congestion and slight cough still    Other     saw Dr Serina Barrios and Dr Hardik Garcia      Patient states still has some chest congestion and slight cough. Pt had refused CT chest last time  Now he agreed  No fever or chills  Pt states albuterol inhaler make it worse    Taking medications regularly. No side effects noted. Review of Systems   Constitutional: Negative for chills, diaphoresis and fever. HENT: Negative. Respiratory: Negative for cough, choking, chest tightness, shortness of breath and wheezing. Cardiovascular: Negative. Negative for chest pain, palpitations and leg swelling. OBJECTIVE: /62   Pulse 72   Temp 97 °F (36.1 °C) (Temporal)   Resp 16   Wt 169 lb 3.2 oz (76.7 kg)   SpO2 99%   BMI 24.99 kg/m²     Wt Readings from Last 3 Encounters:   22 169 lb 3.2 oz (76.7 kg)   22 169 lb (76.7 kg)   22 170 lb 6.4 oz (77.3 kg)        Patient was seen taking COVID-19 precautions. Face mask, gloves were used. Patient also wore facemask. GENERAL: - Alert, oriented, pleasant, in no apparent distress. HEENT: - Conjunctiva pink, no scleral icterus. ENT clear. NECK: -Supple. No jugular venous distention noted. No masses felt,  CARDIOVASCULAR: - Normal S1 and S2    PULMONARY: - No respiratory distress. No wheezes or rales. No wheezing. ABDOMEN: - Soft and non-tender,no masses  ororganomegaly. EXTREMITIES: - No cyanosis, clubbing, or significant edema. SKIN: Skin is warm and dry. NEUROLOGICAL: - Cranial nerves II through XII are grossly intact. IMPRESSION:    Encounter Diagnoses   Name Primary?     Cough Yes    Essential hypertension     Acquired hypothyroidism     Pure hypercholesterolemia        ASSESSMENT/PLAN:    Discussed with pt in detail  Pt continues to have cough and congestion. Will order CT scan of the chest.  Patient to call back for the result  Continue Mucinex DM and steroids for few days  If symptoms do not improve patient should call back    Hypertension controlled continue current medications valsartan and amlodipine    Hypothyroidism. Continue Synthroid    Hyperlipidemia. Continue atorvastatin    GERD symptoms controlled with Protonix      Return to office in a month  Time spent 25 minutes    Mediations reviewed with the patient. Continue current medications. Appropriate prescriptions are addressed. After visit summeryprovided. Follow up as directed sooner if needed. Questions answered and patient verbalizes understanding. Call for any problems, questions, or concerns. Allergies   Allergen Reactions    Antihistamines, Chlorpheniramine-Type      Tachycardia      Pneumovax 23 [Pneumococcal Vac Polyvalent]      Body aches, redness at injection site     Current Outpatient Medications   Medication Sig Dispense Refill    valsartan (DIOVAN) 80 MG tablet Take 1 tablet by mouth daily 90 tablet 1    prednisoLONE acetate (PRED FORTE) 1 % ophthalmic suspension INSTILL 1 DROP INTO SURGICAL EYE 4 TIMES DAILY (START AFTER PATCH IS REMOVED AFTER SURGERY)      ofloxacin (OCUFLOX) 0.3 % solution INSTILL 1 DROP 4 TIMES DAILY IN SURGICAL EYE, START ONE DAY BEFORE SURGERY THEN CONTINUE AFTER SURGERY AS DIRECTED      ketorolac (ACULAR) 0.5 % ophthalmic solution INSTILL 1 DROP IN SURGICAL EYE 4 TIMES DAILY (START ONE DAY BEFORE SURGERY.  CONTINUE AFTER SURGERY AS DIRECTED)      pantoprazole (PROTONIX) 20 MG tablet Take 1 tablet by mouth daily 90 tablet 1    atorvastatin (LIPITOR) 40 MG tablet TAKE ONE TABLET BY MOUTH ONCE DAILY 90 tablet 1    amLODIPine (NORVASC) 5 MG tablet Take 1 tablet by mouth daily 30 tablet 1    albuterol sulfate HFA (VENTOLIN HFA) 108 (90 Base) MCG/ACT inhaler Inhale 2 puffs into the lungs 4 times daily as needed for Wheezing 54 g 1    levothyroxine (SYNTHROID) 50 MCG tablet Take 1 tablet by mouth Daily 90 tablet 1    Probiotic Product (ALIGN PO) Take 1 tablet by mouth daily      OMEGA-3 FATTY ACIDS PO Take by mouth daily      aspirin 81 MG tablet Take 1 tablet by mouth daily 30 tablet 5    MULTIPLE VITAMIN PO Take 1 tablet by mouth daily. No current facility-administered medications for this visit. Past Medical History:   Diagnosis Date    Benign prostatic hyperplasia with urinary frequency 2017    Patient is taking Saw Palmetto combinations    Bladder carcinoma ([facility] [address] 2019    Pt had TCC of lateral bladder wall. Had treatment  Dr Tono Mercer seeing patient.  Bladder tumor 10/15/2018    Ultrasound of the kidneys and bladder on 2018 showed a 2 cm tumor in the bladder. Patient is referred to urologist Dr. Tono Mercer.  H/O colonoscopy 3/2012    Dr. Samia Beaver (hyperlipidemia)     Hypothyroidism      Past Surgical History:   Procedure Laterality Date    APPENDECTOMY  age 24    CATARACT REMOVAL Left 2022    DR Xander Lizarraga CATARACT REMOVAL Right 2022    COLONOSCOPY  2012     DR. Chan    COLONOSCOPY  2021    repeat in 5 yrs per Dr. Serrano Hearing History     Tobacco Use    Smoking status: Former Smoker     Packs/day: 1.00     Years: 35.00     Pack years: 35.00     Types: Cigarettes     Quit date: 1988     Years since quittin.4    Smokeless tobacco: Never Used   Substance Use Topics    Alcohol use: No     Alcohol/week: 0.0 standard drinks       LAB REVIEW:  CBC:   Lab Results   Component Value Date    WBC 6.5 2021    HGB 16.2 2021    HCT 48.5 2021     2021     Lipids:   Lab Results   Component Value Date    HDL 52 2021    LDLDIRECT 53 2021    TRIGLYCFAST 131 2021    CHOLFAST 120 2021     Renal:   Lab Results   Component Value Date    BUN 15 2021    CREATININE 1.0 2021    NA 137 12/30/2021    K 4.2 12/30/2021    ALT 30 12/30/2021    AST 23 12/30/2021    GLUCOSE 109 12/30/2021    GLUF 99 09/02/2020     PT/INR: No results found for: INR  A1C: No results found for: Elijah Jaquez MD, 5/5/2022 , 9:14 AM

## 2022-05-11 ENCOUNTER — HOSPITAL ENCOUNTER (OUTPATIENT)
Dept: CT IMAGING | Age: 77
Discharge: HOME | End: 2022-05-11
Payer: COMMERCIAL

## 2022-05-11 DIAGNOSIS — R05.9 COUGH: ICD-10-CM

## 2022-05-11 PROCEDURE — 71250 CT THORAX DX C-: CPT

## 2022-05-19 PROBLEM — R93.89 ABNORMAL CT OF THE CHEST: Status: ACTIVE | Noted: 2022-05-19

## 2022-07-05 ENCOUNTER — OFFICE VISIT (OUTPATIENT)
Dept: INTERNAL MEDICINE CLINIC | Age: 77
End: 2022-07-05
Payer: COMMERCIAL

## 2022-07-05 VITALS
TEMPERATURE: 98.6 F | WEIGHT: 175 LBS | BODY MASS INDEX: 25.84 KG/M2 | RESPIRATION RATE: 16 BRPM | HEART RATE: 72 BPM | SYSTOLIC BLOOD PRESSURE: 136 MMHG | OXYGEN SATURATION: 97 % | DIASTOLIC BLOOD PRESSURE: 68 MMHG

## 2022-07-05 DIAGNOSIS — E03.9 ACQUIRED HYPOTHYROIDISM: ICD-10-CM

## 2022-07-05 DIAGNOSIS — E78.00 PURE HYPERCHOLESTEROLEMIA: ICD-10-CM

## 2022-07-05 DIAGNOSIS — I10 ESSENTIAL HYPERTENSION: ICD-10-CM

## 2022-07-05 DIAGNOSIS — R19.5 POSITIVE COLORECTAL CANCER SCREENING USING COLOGUARD TEST: ICD-10-CM

## 2022-07-05 DIAGNOSIS — R93.89 ABNORMAL CT OF THE CHEST: ICD-10-CM

## 2022-07-05 DIAGNOSIS — C67.9 BLADDER CARCINOMA (HCC): ICD-10-CM

## 2022-07-05 PROCEDURE — 1123F ACP DISCUSS/DSCN MKR DOCD: CPT | Performed by: INTERNAL MEDICINE

## 2022-07-05 PROCEDURE — 99214 OFFICE O/P EST MOD 30 MIN: CPT | Performed by: INTERNAL MEDICINE

## 2022-07-05 ASSESSMENT — PATIENT HEALTH QUESTIONNAIRE - PHQ9
SUM OF ALL RESPONSES TO PHQ QUESTIONS 1-9: 0
SUM OF ALL RESPONSES TO PHQ QUESTIONS 1-9: 0
SUM OF ALL RESPONSES TO PHQ9 QUESTIONS 1 & 2: 0
2. FEELING DOWN, DEPRESSED OR HOPELESS: 0
SUM OF ALL RESPONSES TO PHQ QUESTIONS 1-9: 0
1. LITTLE INTEREST OR PLEASURE IN DOING THINGS: 0
SUM OF ALL RESPONSES TO PHQ QUESTIONS 1-9: 0

## 2022-07-05 NOTE — PATIENT INSTRUCTIONS
Patient Education        Plantar Fasciitis: Care Instructions  Overview     Plantar fasciitis is pain and inflammation of the plantar fascia, the tissue at the bottom of your foot that connects the heel bone to the toes. The plantar fascia also supports the arch. If you strain the plantar fascia, it can developsmall tears and cause heel pain when you stand or walk. Plantar fasciitis can be caused by running or other sports. It also may occur in people who are overweight or who have high arches or flat feet. You may get plantar fasciitis if you walk or stand for long periods, or have a tightAchilles tendon or calf muscles. You can improve your foot pain with rest and other care at home. It might takea few weeks to a few months for your foot to heal completely. Follow-up care is a key part of your treatment and safety. Be sure to make and go to all appointments, and call your doctor if you are having problems. It's also a good idea to know your test results and keep alist of the medicines you take. How can you care for yourself at home?  Rest your feet often. Reduce your activity to a level that lets you avoid pain. If possible, do not run or walk on hard surfaces.  Take pain medicines exactly as directed. ? If the doctor gave you a prescription medicine for pain, take it as prescribed. ? If you are not taking a prescription pain medicine, take an over-the-counter anti-inflammatory medicine for pain and swelling, such as ibuprofen (Advil, Motrin) or naproxen (Aleve). Read and follow all instructions on the label.  Use ice massage to help with pain and swelling. You can use an ice cube or an ice cup several times a day. To make an ice cup, fill a paper cup with water and freeze it. Cut off the top of the cup until a half-inch of ice shows. Hold onto the remaining paper to use the cup. Rub the ice in small circles over the area for 5 to 7 minutes.    Contrast baths, which alternate hot and cold water, can also help reduce swelling. But because heat alone may make pain and swelling worse, end a contrast bath with a soak in cold water.  Wear a night splint if your doctor suggests it. A night splint holds your foot with the toes pointed up and the foot and ankle at a 90-degree angle. This position gives the bottom of your foot a constant, gentle stretch.  Do simple exercises such as calf stretches and towel stretches 2 to 3 times each day, especially when you first get up in the morning. These can help the plantar fascia become more flexible. They also make the muscles that support your arch stronger. Hold these stretches for 15 to 30 seconds per stretch. Repeat 2 to 4 times. ? Stand about 1 foot from a wall. Place the palms of both hands against the wall at chest level. Lean forward against the wall, keeping one leg with the knee straight and heel on the ground while bending the knee of the other leg.  ? Sit down on the floor or a mat with your feet stretched in front of you. Roll up a towel lengthwise, and loop it over the ball of your foot. Holding the towel at both ends, gently pull the towel toward you to stretch your foot.  Wear shoes with good arch support. Athletic shoes or shoes with a well-cushioned sole are good choices.  Replace athletic shoes regularly.  Try heel cups or shoe inserts (orthotics) to help cushion your heel. You can buy these at many shoe stores.  Put on your shoes as soon as you get out of bed. Going barefoot or wearing slippers may make your pain worse.  Reach and stay at a good weight for your height. This puts less strain on your feet. When should you call for help? Call your doctor now or seek immediate medical care if:     You have heel pain with fever, redness, or warmth in your heel.      You cannot put weight on the sore foot.    Watch closely for changes in your health, and be sure to contact your doctor if:     You have numbness or tingling in your heel.      Your heel pain lasts more than 2 weeks. Where can you learn more? Go to https://[email or web address].[email or web address] org and sign in to your [employer] account. Enter B067 in the [facility] box to learn more about \"Plantar Fasciitis: Care Instructions. \"     If you do not have an account, please click on the \"Sign Up Now\" link. Current as of: March 9, 2022               Content Version: 13.3  © 2006-2022 [employer]. Care instructions adapted under license by [facility] ([facility]). If you have questions about a medical condition or this instruction, always ask your healthcare professional. [place] [address] any warranty or liability for your use of this information. Patient Education        Plantar Fasciitis: Exercises  Introduction  Here are some examples of exercises for you to try. The exercises may be suggested for a condition or for rehabilitation. Start each exercise slowly. Ease off the exercises if you start to have pain. You will be told when to start these exercises and which ones will work bestfor you. How to do the exercises  Towel stretch    1. Sit with your legs extended and knees straight. 2. Place a towel around your foot just under the toes. 3. Hold each end of the towel in each hand, with your hands above your knees. 4. Pull back with the towel so that your foot stretches toward you. 5. Hold the position for at least 15 to 30 seconds. 6. Repeat 2 to 4 times a session, up to 5 sessions a day. Calf stretch    This exercise stretches the muscles at the back of the lower leg (the calf) andthe Achilles tendon. Do this exercise 3 or 4 times a day, 5 days a week. 1. Stand facing a wall with your hands on the wall at about eye level. Put the leg you want to stretch about a step behind your other leg. 2. Keeping your back heel on the floor, bend your front knee until you feel a stretch in the back leg. 3. Hold the stretch for 15 to 30 seconds.  Repeat 2 to 4 times.  Plantar fascia and calf stretch    Stretching the plantar fascia and calf muscles can increase flexibility and decrease heel pain. You can do this exercise several times each day and beforeand after activity. 1. Stand on a step as shown above. Be sure to hold on to the banister. 2. Slowly let your heels down over the edge of the step as you relax your calf muscles. You should feel a gentle stretch across the bottom of your foot and up the back of your leg to your knee. 3. Hold the stretch about 15 to 30 seconds, and then tighten your calf muscle a little to bring your heel back up to the level of the step. Repeat 2 to 4 times. Towel curls    Make this exercise more challenging by placing a weighted object, such as asoup can, on the other end of the towel. 1. While sitting, place your foot on a towel on the floor and scrunch the towel toward you with your toes. 2. Then, also using your toes, push the towel away from you. [place] pickups    1. Put marbles on the floor next to a cup.  2. Using your toes, try to lift the marbles up from the floor and put them in the cup. Follow-up care is a key part of your treatment and safety. Be sure to make and go to all appointments, and call your doctor if you are having problems. It's also a good idea to know your test results and keep alist of the medicines you take. Where can you learn more? Go to https://Roller.[email or web address] org and sign in to your [employer] account. Enter V308 in the Apps[employer] box to learn more about \"Plantar Fasciitis: Exercises. \"     If you do not have an account, please click on the \"Sign Up Now\" link. Current as of: March 9, 2022               Content Version: 13.3  © 2006-2022 [employer]. Care instructions adapted under license by [facility] ([facility]).  If you have questions about a medical condition or this instruction, always ask your healthcare professional. Teri Ng disclaims any warranty or liability for your use of this information.

## 2022-07-05 NOTE — ASSESSMENT & PLAN NOTE
Pt had TCC of lateral bladder wall Dx 12/18. Had treatment   Dr Tri Caballero seeing patient.  Having cystoscopy periodically

## 2022-07-05 NOTE — PROGRESS NOTES
Sandy Boggs  Patient's  is 1945  Seen in office on 2022      SUBJECTIVE:  Echo Pineda sofy 69 y. o.year old male presents today   Chief Complaint   Patient presents with    Follow-up    Pain     right heal, causing him to limp when walking     Pt is here for f/i of HTN HLD, hypothyroidism, GERD  Pt is doing well. Patient has hypertension. Taking medications No headaches, no chest pain, no palpitations and no dizziness. Patient has hyperlipidemia. Taking medications. No abdominal pain, no nausea or vomiting. No myalgias. Pt has hypothyroidism , denies any fatigue   Pt is taking protonix for GERD like symptoms   Taking medications regularly. No side effects noted. Patient is complaining of pain in the right heel for the last few days. Pain is worse on first few steps of the day than     Review of Systems  Review of system is normal except as in HPI      OBJECTIVE: /68   Pulse 72   Temp 98.6 °F (37 °C) (Oral)   Resp 16   Wt 175 lb (79.4 kg)   SpO2 97%   BMI 25.84 kg/m²     Wt Readings from Last 3 Encounters:   22 175 lb (79.4 kg)   22 169 lb 3.2 oz (76.7 kg)   22 169 lb (76.7 kg)      Patient was seen taking COVID-19 precautions. Face mask, gloves were used. Patient also wore facemask. GENERAL:  Alert, oriented, pleasant, in no apparent distress. HEENT:  Conjunctiva pink, no scleral icterus. ENT clear. NECK: Supple. No jugular venous distention noted. No masses felt,  CARDIOVASCULAR:  Normal S1 and S2    PULMONARY:  No respiratory distress. No wheezes or rales. ABDOMEN:  Soft and non-tender,no masses  ororganomegaly. EXTREMITIES:  No cyanosis, clubbing, or significant edema. SKIN: Skin is warm and dry. NEUROLOGICAL:  Cranial nerves II through XII are grossly intact. Tenderness of right heel     IMPRESSION:    Encounter Diagnoses   Name Primary?     Essential hypertension     Abnormal CT of the chest     Acquired hypothyroidism     Pure by mouth daily 90 tablet 1    atorvastatin (LIPITOR) 40 MG tablet TAKE ONE TABLET BY MOUTH ONCE DAILY 90 tablet 1    albuterol sulfate HFA (VENTOLIN HFA) 108 (90 Base) MCG/ACT inhaler Inhale 2 puffs into the lungs 4 times daily as needed for Wheezing 54 g 1    levothyroxine (SYNTHROID) 50 MCG tablet Take 1 tablet by mouth Daily 90 tablet 1    Probiotic Product (ALIGN PO) Take 1 tablet by mouth daily      OMEGA-3 FATTY ACIDS PO Take by mouth daily      aspirin 81 MG tablet Take 1 tablet by mouth daily 30 tablet 5    MULTIPLE VITAMIN PO Take 1 tablet by mouth daily. No current facility-administered medications for this visit. Past Medical History:   Diagnosis Date    Benign prostatic hyperplasia with urinary frequency 2017    Patient is taking Saw Palmetto combinations    Bladder carcinoma ([facility] [address] 2019    Pt had TCC of lateral bladder wall. Had treatment  Dr Brooklyn Alcaraz seeing patient.  Bladder tumor 10/15/2018    Ultrasound of the kidneys and bladder on 2018 showed a 2 cm tumor in the bladder. Patient is referred to urologist Dr. Brooklyn Alcaraz.  H/O colonoscopy 3/2012    Dr. Luis Miguel Carroll (hyperlipidemia)     Hypothyroidism      Past Surgical History:   Procedure Laterality Date    APPENDECTOMY  age 24    CATARACT REMOVAL Left 2022    DR Kristyn Palma CATARACT REMOVAL Right 2022    COLONOSCOPY  2012     DR. Chan    COLONOSCOPY  2021    repeat in 5 yrs per Dr. Gonzalez Baptise History     Tobacco Use    Smoking status: Former Smoker     Packs/day: 1.00     Years: 35.00     Pack years: 35.00     Types: Cigarettes     Quit date: 1988     Years since quittin.6    Smokeless tobacco: Never Used   Substance Use Topics    Alcohol use: No     Alcohol/week: 0.0 standard drinks       LAB REVIEW:  CBC:   Lab Results   Component Value Date/Time    WBC 6.5 2021 07:12 AM    HGB 16.2 2021 07:12 AM    HCT 48.5 2021 07:12 AM     09/29/2021 07:12 AM     Lipids:   Lab Results   Component Value Date    HDL 52 12/30/2021    LDLDIRECT 53 12/30/2021    TRIGLYCFAST 131 12/30/2021    CHOLFAST 120 12/30/2021     Renal:   Lab Results   Component Value Date/Time    BUN 15 12/30/2021 07:08 AM    CREATININE 1.0 12/30/2021 07:08 AM     12/30/2021 07:08 AM    K 4.2 12/30/2021 07:08 AM    ALT 30 12/30/2021 07:08 AM    AST 23 12/30/2021 07:08 AM    GLUCOSE 109 12/30/2021 07:08 AM    GLUF 99 09/02/2020 06:57 AM     PT/INR: No results found for: INR  A1C: No results found for: Nori De MD, 7/5/2022 , 11:48 AM

## 2022-09-07 RX ORDER — LEVOTHYROXINE SODIUM 0.05 MG/1
50 TABLET ORAL DAILY
Qty: 90 TABLET | Refills: 1 | Status: SHIPPED | OUTPATIENT
Start: 2022-09-07

## 2022-09-30 ENCOUNTER — HOSPITAL ENCOUNTER (OUTPATIENT)
Age: 77
Discharge: HOME | End: 2022-09-30
Payer: COMMERCIAL

## 2022-09-30 LAB
ALBUMIN SERPL-MCNC: 4.4 GM/DL (ref 3.4–5)
ALP BLD-CCNC: 123 IU/L (ref 40–129)
ALT SERPL-CCNC: 23 U/L (ref 10–40)
ANION GAP SERPL CALCULATED.3IONS-SCNC: 9 MMOL/L (ref 4–16)
AST SERPL-CCNC: 23 IU/L (ref 15–37)
BILIRUB SERPL-MCNC: 0.5 MG/DL (ref 0–1)
BUN BLDV-MCNC: 17 MG/DL (ref 6–23)
CALCIUM SERPL-MCNC: 10.1 MG/DL (ref 8.3–10.6)
CHLORIDE BLD-SCNC: 105 MMOL/L (ref 99–110)
CHOLESTEROL, FASTING: 111 MG/DL
CO2: 28 MMOL/L (ref 21–32)
CREAT SERPL-MCNC: 1 MG/DL (ref 0.9–1.3)
GFR AFRICAN AMERICAN: >60 ML/MIN/1.73M2
GFR NON-AFRICAN AMERICAN: >60 ML/MIN/1.73M2
GLUCOSE FASTING: 102 MG/DL (ref 70–99)
HDLC SERPL-MCNC: 48 MG/DL
LDL CHOLESTEROL CALCULATED: 38 MG/DL
POTASSIUM SERPL-SCNC: 4.7 MMOL/L (ref 3.5–5.1)
SODIUM BLD-SCNC: 142 MMOL/L (ref 135–145)
TOTAL PROTEIN: 7.1 GM/DL (ref 6.4–8.2)
TRIGLYCERIDE, FASTING: 124 MG/DL
TSH HIGH SENSITIVITY: 4.39 UIU/ML (ref 0.27–4.2)

## 2022-09-30 PROCEDURE — 80061 LIPID PANEL: CPT

## 2022-09-30 PROCEDURE — 80053 COMPREHEN METABOLIC PANEL: CPT

## 2022-09-30 PROCEDURE — 36415 COLL VENOUS BLD VENIPUNCTURE: CPT

## 2022-09-30 PROCEDURE — 84443 ASSAY THYROID STIM HORMONE: CPT

## 2022-10-05 ENCOUNTER — OFFICE VISIT (OUTPATIENT)
Dept: INTERNAL MEDICINE CLINIC | Age: 77
End: 2022-10-05
Payer: COMMERCIAL

## 2022-10-05 VITALS
DIASTOLIC BLOOD PRESSURE: 64 MMHG | WEIGHT: 173.2 LBS | SYSTOLIC BLOOD PRESSURE: 138 MMHG | OXYGEN SATURATION: 99 % | BODY MASS INDEX: 25.58 KG/M2 | HEART RATE: 68 BPM | RESPIRATION RATE: 16 BRPM | TEMPERATURE: 97 F

## 2022-10-05 DIAGNOSIS — N52.8 OTHER MALE ERECTILE DYSFUNCTION: ICD-10-CM

## 2022-10-05 DIAGNOSIS — E78.00 PURE HYPERCHOLESTEROLEMIA: ICD-10-CM

## 2022-10-05 DIAGNOSIS — C67.9 BLADDER CARCINOMA (HCC): ICD-10-CM

## 2022-10-05 DIAGNOSIS — R13.19 ESOPHAGEAL DYSPHAGIA: ICD-10-CM

## 2022-10-05 DIAGNOSIS — I10 ESSENTIAL HYPERTENSION: ICD-10-CM

## 2022-10-05 DIAGNOSIS — E03.9 ACQUIRED HYPOTHYROIDISM: ICD-10-CM

## 2022-10-05 DIAGNOSIS — R93.89 ABNORMAL CT OF THE CHEST: ICD-10-CM

## 2022-10-05 DIAGNOSIS — M25.561 ACUTE PAIN OF RIGHT KNEE: ICD-10-CM

## 2022-10-05 LAB
BILIRUBIN, POC: NEGATIVE
BLOOD URINE, POC: ABNORMAL
CLARITY, POC: CLEAR
COLOR, POC: YELLOW
GLUCOSE URINE, POC: NEGATIVE
KETONES, POC: ABNORMAL
LEUKOCYTE EST, POC: NEGATIVE
NITRITE, POC: NEGATIVE
PH, POC: 6
PROTEIN, POC: NEGATIVE
SPECIFIC GRAVITY, POC: 1.02
UROBILINOGEN, POC: 0.2

## 2022-10-05 PROCEDURE — 99214 OFFICE O/P EST MOD 30 MIN: CPT | Performed by: INTERNAL MEDICINE

## 2022-10-05 PROCEDURE — 93000 ELECTROCARDIOGRAM COMPLETE: CPT | Performed by: INTERNAL MEDICINE

## 2022-10-05 PROCEDURE — 1123F ACP DISCUSS/DSCN MKR DOCD: CPT | Performed by: INTERNAL MEDICINE

## 2022-10-05 PROCEDURE — 81002 URINALYSIS NONAUTO W/O SCOPE: CPT | Performed by: INTERNAL MEDICINE

## 2022-10-05 RX ORDER — VALSARTAN 80 MG/1
80 TABLET ORAL DAILY
Qty: 90 TABLET | Refills: 1 | Status: SHIPPED | OUTPATIENT
Start: 2022-10-05

## 2022-10-05 RX ORDER — AMLODIPINE BESYLATE 5 MG/1
5 TABLET ORAL DAILY
Qty: 90 TABLET | Refills: 1 | Status: SHIPPED | OUTPATIENT
Start: 2022-10-05

## 2022-10-05 RX ORDER — ATORVASTATIN CALCIUM 40 MG/1
TABLET, FILM COATED ORAL
Qty: 90 TABLET | Refills: 1 | Status: SHIPPED | OUTPATIENT
Start: 2022-10-05

## 2022-10-05 RX ORDER — SILDENAFIL 50 MG/1
50 TABLET, FILM COATED ORAL PRN
Qty: 5 TABLET | Refills: 0 | Status: SHIPPED | OUTPATIENT
Start: 2022-10-05

## 2022-10-05 ASSESSMENT — PATIENT HEALTH QUESTIONNAIRE - PHQ9
SUM OF ALL RESPONSES TO PHQ QUESTIONS 1-9: 0
2. FEELING DOWN, DEPRESSED OR HOPELESS: 0
1. LITTLE INTEREST OR PLEASURE IN DOING THINGS: 0
SUM OF ALL RESPONSES TO PHQ QUESTIONS 1-9: 0
SUM OF ALL RESPONSES TO PHQ QUESTIONS 1-9: 0
SUM OF ALL RESPONSES TO PHQ9 QUESTIONS 1 & 2: 0
SUM OF ALL RESPONSES TO PHQ QUESTIONS 1-9: 0

## 2022-10-05 ASSESSMENT — ENCOUNTER SYMPTOMS
SHORTNESS OF BREATH: 0
RESPIRATORY NEGATIVE: 1
COUGH: 0
WHEEZING: 0
EYES NEGATIVE: 1
ALLERGIC/IMMUNOLOGIC NEGATIVE: 1
GASTROINTESTINAL NEGATIVE: 1

## 2022-10-05 NOTE — PROGRESS NOTES
Geno Agarwal  Patient's  is 1945  Seen in office on 10/5/2022      SUBJECTIVE:  Mina goetz 69 y. o.year old male presents today   Chief Complaint   Patient presents with    3 Month Follow-Up    Results     Lab review    Knee Pain     Right knee x 2 weeks, \"feels like it wants to buckle on me\"    Other     Odor to urine, wanting UA     Medication Refill     Pain in the right knee medially for 2 weeks. No injury   Pt had earlier heel pain that resolved. Here for f/u of HLD HTN BPH hypothyroidism   Patient has hypertension. Taking medications No headaches, no chest pain, no palpitations and no dizziness. Patient has hyperlipidemia. Taking medications. No abdominal pain, no nausea or vomiting. No myalgias. Pt has hypothyroidism : no fatigue, No palpitation    Has odor in urine. No fever or chills. No abdominal pain. Taking medications regularly. No side effects noted. Review of Systems   Constitutional: Negative. HENT: Negative. Eyes: Negative. Respiratory: Negative. Negative for cough, shortness of breath and wheezing. Cardiovascular: Negative. Gastrointestinal: Negative. Endocrine: Negative. Genitourinary: Negative. Musculoskeletal:         Joint pain    Skin: Negative. Allergic/Immunologic: Negative. Neurological: Negative. Hematological: Negative. Psychiatric/Behavioral: Negative. OBJECTIVE: /64   Pulse 68   Temp 97 °F (36.1 °C) (Temporal)   Resp 16   Wt 173 lb 3.2 oz (78.6 kg)   SpO2 99%   BMI 25.58 kg/m²     Wt Readings from Last 3 Encounters:   10/05/22 173 lb 3.2 oz (78.6 kg)   22 175 lb (79.4 kg)   22 169 lb 3.2 oz (76.7 kg)      GENERAL: - Alert, oriented, pleasant, in no apparent distress. HEENT: - Conjunctiva pink, no scleral icterus. ENT clear. NECK: -Supple. No jugular venous distention noted. No masses felt,  CARDIOVASCULAR: - Normal S1 and S2    PULMONARY: - No respiratory distress. No wheezes or rales. ABDOMEN: - Soft and non-tender,no masses  ororganomegaly. EXTREMITIES: - No cyanosis, clubbing, or significant edema. Right knee tenderness medially   SKIN: Skin is warm and dry. NEUROLOGICAL: - Cranial nerves II through XII are grossly intact. IMPRESSION:    Encounter Diagnoses   Name Primary? Essential hypertension     Abnormal CT of the chest     Acquired hypothyroidism     Pure hypercholesterolemia     Bladder carcinoma (HCC)     Esophageal dysphagia     Acute pain of right knee        ASSESSMENT/PLAN:    1. Essential hypertension  Overview:  Patient on diovan 80 mg daily  and amlodipine 5 mg daily     Assessment & Plan:    Hypertension in control. Follow low salt diet. Continue current treatment. Orders:  -     EKG 12 Lead; Future  2. Abnormal CT of the chest  Overview:  CT chest showed 5/2022  : IUP ? Pt wants to wait  for referral. To pulmnologist   Refused again on 7/5/22  Assessment & Plan:     3. Acquired hypothyroidism  Overview:  Patient is on levothyroxine 50 mcg daily  Patient is stable. Continue current treatment. Assessment & Plan:    TSH 4.39 : Hypertension in control. Follow low salt diet. Continue current treatment. 4. Pure hypercholesterolemia  Overview:  Patient is on atorvastatin 40 mg daily  Hyperlipidemia is stable. Follow low cholesterol diet. Continue current treatment. Assessment & Plan:    Hyperlipidemia is stable. Follow low cholesterol diet. Continue current treatment. 5. Bladder carcinoma ([facility] [address]  Overview:  Pt had TCC of lateral bladder wall Dx 12/18. Had treatment   Dr Anthony Rojas seeing patient. Having cystoscopy periodically   Assessment & Plan:    Patient is stable. Continue current treatment. Orders:  -     POCT Urinalysis no Micro  6. Esophageal dysphagia  Overview:  Patient has esophageal dysphagia. ct chest is normal. UGI mild HH and dysmotility . On protonix 40 mg daily    Assessment & Plan:    doing well. No dysphagia   7.  Acute pain of right knee  Overview:  Pain in the right knee medially for 2 weeks   Does not want referral . X rays   Use voltaren gel   8. Other male erectile dysfunction  Overview:  H/o ED > seen Dr Ale Bolivar in the past. Ace Muss in the past . He had no side effects to medications. Will order viagra 50 mg daily   EKG is normal.     9. Oder in urine : UA is normal.   .  Orders Placed This Encounter   Procedures    POCT Urinalysis no Micro    EKG 12 Lead                Mediations reviewed with the patient. Continue current medications. Appropriate prescriptions are addressed. After visit summeryprovided. Follow up as directed sooner if needed. Questions answered and patient verbalizes understanding. Call for any problems, questions, or concerns. Allergies   Allergen Reactions    Antihistamines, Chlorpheniramine-Type      Tachycardia      Pneumovax 23 [Pneumococcal Vac Polyvalent]      Body aches, redness at injection site     Current Outpatient Medications   Medication Sig Dispense Refill    levothyroxine (SYNTHROID) 50 MCG tablet Take 1 tablet by mouth Daily 90 tablet 1    amLODIPine (NORVASC) 5 MG tablet Take 1 tablet by mouth daily 30 tablet 5    valsartan (DIOVAN) 80 MG tablet Take 1 tablet by mouth daily 90 tablet 1    pantoprazole (PROTONIX) 20 MG tablet Take 1 tablet by mouth daily (Patient taking differently: Take 20 mg by mouth every other day) 90 tablet 1    atorvastatin (LIPITOR) 40 MG tablet TAKE ONE TABLET BY MOUTH ONCE DAILY 90 tablet 1    albuterol sulfate HFA (VENTOLIN HFA) 108 (90 Base) MCG/ACT inhaler Inhale 2 puffs into the lungs 4 times daily as needed for Wheezing 54 g 1    Probiotic Product (ALIGN PO) Take 1 tablet by mouth daily      OMEGA-3 FATTY ACIDS PO Take by mouth daily      aspirin 81 MG tablet Take 1 tablet by mouth daily 30 tablet 5    MULTIPLE VITAMIN PO Take 1 tablet by mouth daily. No current facility-administered medications for this visit.      Past Medical History:   Diagnosis Date Benign prostatic hyperplasia with urinary frequency 2017    Patient is taking Saw Palmetto combinations    Bladder carcinoma ([address] 2019    Pt had TCC of lateral bladder wall. Had treatment  Dr Benny Sow seeing patient. Bladder tumor 10/15/2018    Ultrasound of the kidneys and bladder on 2018 showed a 2 cm tumor in the bladder. Patient is referred to urologist Dr. Benny Sow. H/O colonoscopy 3/2012    Dr. Alexa Sims (hyperlipidemia)     Hypothyroidism      Past Surgical History:   Procedure Laterality Date    APPENDECTOMY  age 24    CATARACT REMOVAL Left 2022    DR Mirtha Weinberg    CATARACT REMOVAL Right 2022    COLONOSCOPY  2012     DR. Chan    COLONOSCOPY  2021    repeat in 5 yrs per Dr. Chris Fuller History     Tobacco Use    Smoking status: Former     Packs/day: 1.00     Years: 35.00     Pack years: 35.00     Types: Cigarettes     Quit date: 1988     Years since quittin.8    Smokeless tobacco: Never   Substance Use Topics    Alcohol use: No     Alcohol/week: 0.0 standard drinks       LAB REVIEW:  CBC:   Lab Results   Component Value Date/Time    WBC 6.5 2021 07:12 AM    HGB 16.2 2021 07:12 AM    HCT 48.5 2021 07:12 AM     2021 07:12 AM     Lipids:   Lab Results   Component Value Date    HDL 48 2022    LDLCALC 38 2022    LDLDIRECT 53 2021    TRIGLYCFAST 124 2022    CHOLFAST 111 2022     Renal:   Lab Results   Component Value Date/Time    BUN 17 2022 07:00 AM    CREATININE 1.0 2022 07:00 AM     2022 07:00 AM    K 4.7 2022 07:00 AM    ALT 23 2022 07:00 AM    AST 23 2022 07:00 AM    GLUCOSE 109 2021 07:08 AM    GLUF 102 2022 07:00 AM     PT/INR: No results found for: INR  A1C: No results found for: Surendra Davidson MD, 10/5/2022 , 9:32 AM

## 2022-10-28 ENCOUNTER — OFFICE VISIT (OUTPATIENT)
Dept: INTERNAL MEDICINE CLINIC | Age: 77
End: 2022-10-28
Payer: COMMERCIAL

## 2022-10-28 VITALS
HEIGHT: 69 IN | BODY MASS INDEX: 25.77 KG/M2 | WEIGHT: 174 LBS | OXYGEN SATURATION: 97 % | SYSTOLIC BLOOD PRESSURE: 134 MMHG | DIASTOLIC BLOOD PRESSURE: 86 MMHG | HEART RATE: 67 BPM

## 2022-10-28 DIAGNOSIS — Z00.00 MEDICARE ANNUAL WELLNESS VISIT, SUBSEQUENT: Primary | ICD-10-CM

## 2022-10-28 PROCEDURE — 3074F SYST BP LT 130 MM HG: CPT | Performed by: INTERNAL MEDICINE

## 2022-10-28 PROCEDURE — G0439 PPPS, SUBSEQ VISIT: HCPCS | Performed by: INTERNAL MEDICINE

## 2022-10-28 PROCEDURE — 1123F ACP DISCUSS/DSCN MKR DOCD: CPT | Performed by: INTERNAL MEDICINE

## 2022-10-28 PROCEDURE — 3078F DIAST BP <80 MM HG: CPT | Performed by: INTERNAL MEDICINE

## 2022-10-28 SDOH — ECONOMIC STABILITY: FOOD INSECURITY: WITHIN THE PAST 12 MONTHS, THE FOOD YOU BOUGHT JUST DIDN'T LAST AND YOU DIDN'T HAVE MONEY TO GET MORE.: NEVER TRUE

## 2022-10-28 SDOH — ECONOMIC STABILITY: FOOD INSECURITY: WITHIN THE PAST 12 MONTHS, YOU WORRIED THAT YOUR FOOD WOULD RUN OUT BEFORE YOU GOT MONEY TO BUY MORE.: NEVER TRUE

## 2022-10-28 ASSESSMENT — PATIENT HEALTH QUESTIONNAIRE - PHQ9
SUM OF ALL RESPONSES TO PHQ QUESTIONS 1-9: 0
SUM OF ALL RESPONSES TO PHQ9 QUESTIONS 1 & 2: 0
SUM OF ALL RESPONSES TO PHQ QUESTIONS 1-9: 0
1. LITTLE INTEREST OR PLEASURE IN DOING THINGS: 0
SUM OF ALL RESPONSES TO PHQ QUESTIONS 1-9: 0
SUM OF ALL RESPONSES TO PHQ QUESTIONS 1-9: 0
2. FEELING DOWN, DEPRESSED OR HOPELESS: 0

## 2022-10-28 ASSESSMENT — LIFESTYLE VARIABLES
HOW MANY STANDARD DRINKS CONTAINING ALCOHOL DO YOU HAVE ON A TYPICAL DAY: PATIENT DOES NOT DRINK
HOW OFTEN DO YOU HAVE A DRINK CONTAINING ALCOHOL: NEVER

## 2022-10-28 ASSESSMENT — SOCIAL DETERMINANTS OF HEALTH (SDOH): HOW HARD IS IT FOR YOU TO PAY FOR THE VERY BASICS LIKE FOOD, HOUSING, MEDICAL CARE, AND HEATING?: NOT HARD AT ALL

## 2022-10-28 NOTE — PATIENT INSTRUCTIONS
Personalized Preventive Plan for Estiven Delgado - 10/28/2022  Medicare offers a range of preventive health benefits. Some of the tests and screenings are paid in full while other may be subject to a deductible, co-insurance, and/or copay. Some of these benefits include a comprehensive review of your medical history including lifestyle, illnesses that may run in your family, and various assessments and screenings as appropriate. After reviewing your medical record and screening and assessments performed today your provider may have ordered immunizations, labs, imaging, and/or referrals for you. A list of these orders (if applicable) as well as your Preventive Care list are included within your After Visit Summary for your review. Other Preventive Recommendations:    A preventive eye exam performed by an eye specialist is recommended every 1-2 years to screen for glaucoma; cataracts, macular degeneration, and other eye disorders. A preventive dental visit is recommended every 6 months. Try to get at least 150 minutes of exercise per week or 10,000 steps per day on a pedometer . Order or download the FREE \"Exercise & Physical Activity: Your Everyday Guide\" from [employer]. Call 3-485.980.5681 or search [employer] online. You need 9052-1723 mg of calcium and 9256-1702 IU of vitamin D per day. It is possible to meet your calcium requirement with diet alone, but a vitamin D supplement is usually necessary to meet this goal.  When exposed to the sun, use a sunscreen that protects against both UVA and UVB radiation with an SPF of 30 or greater. Reapply every 2 to 3 hours or after sweating, drying off with a towel, or swimming. Always wear a seat belt when traveling in a car. Always wear a helmet when riding a bicycle or motorcycle. Heart-Healthy Diet   Sodium, Fat, and Cholesterol Controlled Diet       What Is a Heart Healthy Diet?    A heart-healthy diet is one that limits sodium , certain types of fat , and cholesterol . This type of diet is recommended for:   People with any form of cardiovascular disease (eg, coronary heart disease , peripheral vascular disease , previous heart attack , previous stroke )   People with risk factors for cardiovascular disease, such as high blood pressure , high cholesterol , or diabetes   Anyone who wants to lower their risk of developing cardiovascular disease   Sodium    Sodium is a mineral found in many foods. In general, most people consume much more sodium than they need. Diets high in sodium can increase blood pressure and lead to edema (water retention). On a heart-healthy diet, you should consume no more than 2,300 mg (milligrams) of sodium per dayabout the amount in one teaspoon of table salt. The foods highest in sodium include table salt (about 50% sodium), processed foods, convenience foods, and preserved foods. Cholesterol    Cholesterol is a fat-like, waxy substance in your blood. Our bodies make some cholesterol. It is also found in animal products, with the highest amounts in fatty meat, egg yolks, whole milk, cheese, shellfish, and organ meats. On a heart-healthy diet, you should limit your cholesterol intake to less than 200 mg per day. It is normal and important to have some cholesterol in your bloodstream. But too much cholesterol can cause plaque to build up within your arteries, which can eventually lead to a heart attack or stroke. The two types of cholesterol that are most commonly referred to are:   Low-density lipoprotein (LDL) cholesterol  Also known as bad cholesterol, this is the cholesterol that tends to build up along your arteries. Bad cholesterol levels are increased by eating fats that are saturated or hydrogenated. Optimal level of this cholesterol is less than 100. Over 130 starts to get risky for heart disease.    High-density lipoprotein (HDL) cholesterol  Also known as good cholesterol, this type of cholesterol actually carries cholesterol away from your arteries and may, therefore, help lower your risk of having a heart attack. You want this level to be high (ideally greater than 60). It is a risk to have a level less than 40. You can raise this good cholesterol by eating olive oil, canola oil, avocados, or nuts. Exercise raises this level, too. Fat    Fat is calorie dense and packs a lot of calories into a small amount of food. Even though fats should be limited due to their high calorie content, not all fats are bad. In fact, some fats are quite healthful. Fat can be broken down into four main types. The good-for-you fats are:   Monounsaturated fat  found in oils such as olive and canola, avocados, and nuts and natural nut butters; can decrease cholesterol levels, while keeping levels of HDL cholesterol high   Polyunsaturated fat  found in oils such as safflower, sunflower, soybean, corn, and sesame; can decrease total cholesterol and LDL cholesterol   Omega-3 fatty acids  particularly those found in fatty fish (such as salmon, trout, tuna, mackerel, herring, and sardines); can decrease risk of arrhythmias, decrease triglyceride levels, and slightly lower blood pressure   The fats that you want to limit are:   Saturated fat  found in animal products, many fast foods, and a few vegetables; increases total blood cholesterol, including LDL levels   Animal fats that are saturated include: butter, lard, whole-milk dairy products, meat fat, and poultry skin   Vegetable fats that are saturated include: hydrogenated shortening, palm oil, coconut oil, cocoa butter   Hydrogenated or trans fat  found in margarine and vegetable shortening, most shelf stable snack foods, and fried foods; increases LDL and decreases HDL     It is generally recommended that you limit your total fat for the day to less than 30% of your total calories.  If you follow an 1800-calorie heart healthy diet, for example, this would mean 60 grams of fat or less per day. Saturated fat and trans fat in your diet raises your blood cholesterol the most, much more than dietary cholesterol does. For this reason, on a heart-healthy diet, less than 7% of your calories should come from saturated fat and ideally 0% from trans fat. On an 1800-calorie diet, this translates into less than 14 grams of saturated fat per day, leaving 46 grams of fat to come from mono- and polyunsaturated fats.    Food Choices on a Heart Healthy Diet   Food Category   Foods Recommended   Foods to Avoid   Grains   Breads and rolls without salted tops Most dry and cooked cereals Unsalted crackers and breadsticks Low-sodium or homemade breadcrumbs or stuffing All rice and pastas   Breads, rolls, and crackers with salted tops High-fat baked goods (eg, muffins, donuts, pastries) Quick breads, self-rising flour, and biscuit mixes Regular bread crumbs Instant hot cereals Commercially prepared rice, pasta, or stuffing mixes   Vegetables   Most fresh, frozen, and low-sodium canned vegetables Low-sodium and salt-free vegetable juices Canned vegetables if unsalted or rinsed   Regular canned vegetables and juices, including sauerkraut and pickled vegetables Frozen vegetables with sauces Commercially prepared potato and vegetable mixes   Fruits   Most fresh, frozen, and canned fruits All fruit juices   Fruits processed with salt or sodium   Milk   Nonfat or low-fat (1%) milk Nonfat or low-fat yogurt Cottage cheese, low-fat ricotta, cheeses labeled as low-fat and low-sodium   Whole milk Reduced-fat (2%) milk Malted and chocolate milk Full fat yogurt Most cheeses (unless low-fat and low salt) Buttermilk (no more than 1 cup per week)   Meats and Beans   Lean cuts of fresh or frozen beef, veal, lamb, or pork (look for the word loin) Fresh or frozen poultry without the skin Fresh or frozen fish and some shellfish Egg whites and egg substitutes (Limit whole eggs to three per week) Tofu Nuts or seeds (unsalted, dry-roasted), low-sodium peanut butter Dried peas, beans, and lentils   Any smoked, cured, salted, or canned meat, fish, or poultry (including deshpande, chipped beef, cold cuts, hot dogs, sausages, sardines, and anchovies) Poultry skins Breaded and/or fried fish or meats Canned peas, beans, and lentils Salted nuts   Fats and Oils   Olive oil and canola oil Low-sodium, low-fat salad dressings and mayonnaise   Butter, margarine, coconut and palm oils, deshpande fat   Snacks, Sweets, and Condiments   Low-sodium or unsalted versions of broths, soups, soy sauce, and condiments Pepper, herbs, and spices; vinegar, lemon, or lime juice Low-fat frozen desserts (yogurt, sherbet, fruit bars) Sugar, cocoa powder, honey, syrup, jam, and preserves Low-fat, trans-fat free cookies, cakes, and pies Chai and animal crackers, fig bars, luis angel snaps   High-fat desserts Broth, soups, gravies, and sauces, made from instant mixes or other high-sodium ingredients Salted snack foods Canned olives Meat tenderizers, seasoning salt, and most flavored vinegars   Beverages   Low-sodium carbonated beverages Tea and coffee in moderation Soy milk   Commercially softened water   Suggestions   Make whole grains, fruits, and vegetables the base of your diet. Choose heart-healthy fats such as canola, olive, and flaxseed oil, and foods high in heart-healthy fats, such as nuts, seeds, soybeans, tofu, and fish. Eat fish at least twice per week; the fish highest in omega-3 fatty acids and lowest in mercury include salmon, herring, mackerel, sardines, and canned chunk light tuna. If you eat fish less than twice per week or have high triglycerides, talk to your doctor about taking fish oil supplements. Read food labels. For products low in fat and cholesterol, look for fat free, low-fat, cholesterol free, saturated fat free, and trans fat freeAlso scan the Nutrition Facts Label, which lists saturated fat, trans fat, and cholesterol amounts. make some cholesterol. It is also found in animal products, with the highest amounts in fatty meat, egg yolks, whole milk, cheese, shellfish, and organ meats. On a heart-healthy diet, you should limit your cholesterol intake to less than 200 mg per day. It is normal and important to have some cholesterol in your bloodstream. But too much cholesterol can cause plaque to build up within your arteries, which can eventually lead to a heart attack or stroke. The two types of cholesterol that are most commonly referred to are:   Low-density lipoprotein (LDL) cholesterol  Also known as bad cholesterol, this is the cholesterol that tends to build up along your arteries. Bad cholesterol levels are increased by eating fats that are saturated or hydrogenated. Optimal level of this cholesterol is less than 100. Over 130 starts to get risky for heart disease. High-density lipoprotein (HDL) cholesterol  Also known as good cholesterol, this type of cholesterol actually carries cholesterol away from your arteries and may, therefore, help lower your risk of having a heart attack. You want this level to be high (ideally greater than 60). It is a risk to have a level less than 40. You can raise this good cholesterol by eating olive oil, canola oil, avocados, or nuts. Exercise raises this level, too. Fat    Fat is calorie dense and packs a lot of calories into a small amount of food. Even though fats should be limited due to their high calorie content, not all fats are bad. In fact, some fats are quite healthful. Fat can be broken down into four main types.    The good-for-you fats are:   Monounsaturated fat  found in oils such as olive and canola, avocados, and nuts and natural nut butters; can decrease cholesterol levels, while keeping levels of HDL cholesterol high   Polyunsaturated fat  found in oils such as safflower, sunflower, soybean, corn, and sesame; can decrease total cholesterol and LDL cholesterol   Omega-3 fatty acids  particularly those found in fatty fish (such as salmon, trout, tuna, mackerel, herring, and sardines); can decrease risk of arrhythmias, decrease triglyceride levels, and slightly lower blood pressure   The fats that you want to limit are:   Saturated fat  found in animal products, many fast foods, and a few vegetables; increases total blood cholesterol, including LDL levels   Animal fats that are saturated include: butter, lard, whole-milk dairy products, meat fat, and poultry skin   Vegetable fats that are saturated include: hydrogenated shortening, palm oil, coconut oil, cocoa butter   Hydrogenated or trans fat  found in margarine and vegetable shortening, most shelf stable snack foods, and fried foods; increases LDL and decreases HDL     It is generally recommended that you limit your total fat for the day to less than 30% of your total calories. If you follow an 1800-calorie heart healthy diet, for example, this would mean 60 grams of fat or less per day. Saturated fat and trans fat in your diet raises your blood cholesterol the most, much more than dietary cholesterol does. For this reason, on a heart-healthy diet, less than 7% of your calories should come from saturated fat and ideally 0% from trans fat. On an 1800-calorie diet, this translates into less than 14 grams of saturated fat per day, leaving 46 grams of fat to come from mono- and polyunsaturated fats.    Food Choices on a Heart Healthy Diet   Food Category   Foods Recommended   Foods to Avoid   Grains   Breads and rolls without salted tops Most dry and cooked cereals Unsalted crackers and breadsticks Low-sodium or homemade breadcrumbs or stuffing All rice and pastas   Breads, rolls, and crackers with salted tops High-fat baked goods (eg, muffins, donuts, pastries) Quick breads, self-rising flour, and biscuit mixes Regular bread crumbs Instant hot cereals Commercially prepared rice, pasta, or stuffing mixes   Vegetables   Most fresh, frozen, and low-sodium canned vegetables Low-sodium and salt-free vegetable juices Canned vegetables if unsalted or rinsed   Regular canned vegetables and juices, including sauerkraut and pickled vegetables Frozen vegetables with sauces Commercially prepared potato and vegetable mixes   Fruits   Most fresh, frozen, and canned fruits All fruit juices   Fruits processed with salt or sodium   Milk   Nonfat or low-fat (1%) milk Nonfat or low-fat yogurt Cottage cheese, low-fat ricotta, cheeses labeled as low-fat and low-sodium   Whole milk Reduced-fat (2%) milk Malted and chocolate milk Full fat yogurt Most cheeses (unless low-fat and low salt) Buttermilk (no more than 1 cup per week)   Meats and Beans   Lean cuts of fresh or frozen beef, veal, lamb, or pork (look for the word loin) Fresh or frozen poultry without the skin Fresh or frozen fish and some shellfish Egg whites and egg substitutes (Limit whole eggs to three per week) Tofu Nuts or seeds (unsalted, dry-roasted), low-sodium peanut butter Dried peas, beans, and lentils   Any smoked, cured, salted, or canned meat, fish, or poultry (including deshpande, chipped beef, cold cuts, hot dogs, sausages, sardines, and anchovies) Poultry skins Breaded and/or fried fish or meats Canned peas, beans, and lentils Salted nuts   Fats and Oils   Olive oil and canola oil Low-sodium, low-fat salad dressings and mayonnaise   Butter, margarine, coconut and palm oils, deshpande fat   Snacks, Sweets, and Condiments   Low-sodium or unsalted versions of broths, soups, soy sauce, and condiments Pepper, herbs, and spices; vinegar, lemon, or lime juice Low-fat frozen desserts (yogurt, sherbet, fruit bars) Sugar, cocoa powder, honey, syrup, jam, and preserves Low-fat, trans-fat free cookies, cakes, and pies Chai and animal crackers, fig bars, luis angel snaps   High-fat desserts Broth, soups, gravies, and sauces, made from instant mixes or other high-sodium ingredients Salted snack foods Canned olives Meat tenderizers, seasoning salt, and most flavored vinegars   Beverages   Low-sodium carbonated beverages Tea and coffee in moderation Soy milk   Commercially softened water   Suggestions   Make whole grains, fruits, and vegetables the base of your diet. Choose heart-healthy fats such as canola, olive, and flaxseed oil, and foods high in heart-healthy fats, such as nuts, seeds, soybeans, tofu, and fish. Eat fish at least twice per week; the fish highest in omega-3 fatty acids and lowest in mercury include salmon, herring, mackerel, sardines, and canned chunk light tuna. If you eat fish less than twice per week or have high triglycerides, talk to your doctor about taking fish oil supplements. Read food labels. For products low in fat and cholesterol, look for fat free, low-fat, cholesterol free, saturated fat free, and trans fat freeAlso scan the Nutrition Facts Label, which lists saturated fat, trans fat, and cholesterol amounts. For products low in sodium, look for sodium free, very low sodium, low sodium, no added salt, and unsalted   Skip the salt when cooking or at the table; if food needs more flavor, get creative and try out different herbs and spices. Garlic and onion also add substantial flavor to foods. Trim any visible fat off meat and poultry before cooking, and drain the fat off after angel. Use cooking methods that require little or no added fat, such as grilling, boiling, baking, poaching, broiling, roasting, steaming, stir-frying, and sauting. Avoid fast food and convenience food. They tend to be high in saturated and trans fat and have a lot of added salt. Talk to a registered dietitian for individualized diet advice. Last Reviewed: March 2011 Laura Lorenzo MS, MPH, RD   Updated: 3/29/2011     High-Fiber Diet     What Is Fiber? Dietary fiber is a form of carbohydrate found in plants that cannot be digested by humans.  All plants contain fiber, including fruits, vegetables, grains, and legumes. Fiber is often classified into two categories: soluble and insoluble. Soluble fiber draws water into the bowel and can help slow digestion. Examples of foods that are high in soluble fiber include oatmeal, oat bran, barley, legumes (eg, beans and peas), apples, and strawberries. Insoluble fiber speeds digestion and can add bulk to the stool. Examples of foods that are high in insoluble fiber include whole-wheat products, wheat bran, cauliflower, green beans, and potatoes. Why Follow a High-Fiber Diet? A high-fiber diet is often recommended to prevent and treat constipation , hemorrhoids , diverticulitis , and irritable bowel syndrome . Eating a high-fiber diet can also help improve your cholesterol levels, lower your risk of coronary heart disease , reduce your risk of type 2 diabetes , and lower your weight. For people with type 1 or 2 diabetes, a high-fiber diet can also help stabilize blood sugar levels. How Much Fiber Should I Eat? A high-fiber diet should contain  20-35 grams  of fiber a day. This is actually the amount recommended for the general adult population; however, most Americans eat only 15 grams of fiber per day. Digestion of Fiber   Eating a higher fiber diet than usual can take some getting used to by your body's digestive system. To avoid the side effects of sudden increases in dietary fiber (eg, gas, cramping, bloating, and diarrhea), increase fiber gradually and be sure to drink plenty of fluids every day. Tips for Increasing Fiber Intake   Whenever possible, choose whole grains over refined grains (eg, brown rice instead of white rice, whole-wheat bread instead of white bread). Include a variety of grains in your diet, such as wheat, rye, barley, oats, quinoa, and bulgur. Eat more vegetarian-based meals. Here are some ideas: black bean burgers, eggplant lasagna, and veggie tofu stir-whitlock.     Choose high-fiber snacks, such as fruits, popcorn, whole-grain crackers, and nuts. Make whole-grain cereal or whole-grain toast part of your daily breakfast regime. When eating out, whether ordering a sandwich or dinner, ask for extra vegetables. When baking, replace part of the white flour with whole-wheat flour. Whole-wheat flour is particularly easy to incorporate into a recipe. High-Fiber Diet Eating Guide   Food Category   Foods Recommended   Notes   Grains   Whole-grain breads, muffins, bagels, or alfredo bread Rye bread Whole-wheat crackers or crisp breads Whole-grain or bran cereals Oatmeal, oat bran, or grits Wheat germ Whole-wheat pasta and brown rice   Read the ingredients list on food labels. Look for products that list \"whole\" as the first ingredient (eg, whole-wheat, whole oats). Choose cereals with at least 2 grams of fiber per serving. Vegetables   All vegetables, especially asparagus, bean sprouts, broccoli, [place] sprouts, cabbage, carrots, cauliflower, celery, corn, greens, green beans, green pepper, onions, peas, potatoes (with skin), snow peas, spinach, squash, sweet potatoes, tomatoes, zucchini   For maximum fiber intake, eat the peels of fruits and vegetablesjust be sure to wash them well first.   Fruits   All fruits, especially apples, berries, grapefruits, mangoes, nectarines, oranges, peaches, pears, dried fruits (figs, dates, prunes, raisins)   Choose raw fruits and vegetables over juice, cooked, or cannedraw fruit has more fiber. Dried fruit is also a good source of fiber. Milk   With the exception of yogurt containing inulin (a type of fiber), dairy foods provide little fiber. Add more fiber by topping your yogurt or cottage cheese with fresh fruit, whole grain or bran cereals, nuts, or seeds.    Meats and Beans   All beans and peas, especially Garbanzo beans, kidney beans, lentils, lima beans, split peas, and valencia beans All nuts and seeds, especially almonds, peanuts, [place] nuts, cashews, peanut butter, walnuts, sesame and sunflower seeds All meat, poultry, fish, and eggs   Increase fiber in meat dishes by adding val[place] beans, kidney beans, black-eyed peas, bran, or oatmeal. If you are following a low-fat diet, use nuts and seeds only in moderation. Fats and Oils   All in moderation   Fats and oils do not provide fiber   Snacks, Sweets, and Condiments   Fruit Nuts Popcorn, whole-wheat pretzels, or trail mix made with dried fruits, nuts, and seeds Cakes, breads, and cookies made with oatmeal or whole-wheat flour   Most snack foods do not provide much fiber. Choose snacks with at least 2 grams of fiber per serving. Last Reviewed: March 2011 Emilie Christopher MS, MPH, RD   Updated: 3/29/2011     Keep Your Memory Latisha Rosenbaum       Many factors can affect your ability to remembera hectic lifestyle, aging, stress, chronic disease, and certain medicines. But, there are steps you can take to sharpen your mind and help preserve your memory. Challenge Your Brain   Regularly challenging your mind may help keeps it in top shape. Good mental exercises include:   Crossword puzzlesUse a dictionary if you need it; you will learn more that way. Brainteasers Try some! Crafts, such as wood working and sewing   Hobbies, such as gardening and building model airplanes   SocializingVisit old friends or join groups to meet new ones. Reading   Learning a new language   Taking a class, whether it be art history or arsen chi   TravelingExperience the food, history, and culture of your destination   Learning to use a computer   Going to museums, the theater, or thought-provoking movies   Changing things in your daily life, such as reversing your pattern in the grocery store or brushing your teeth using your nondominant hand   Use Memory Aids   There is no need to remember every detail on your own.  These memory aids can help:   Calendars and day planners   Electronic organizers to store all sorts of helpful informationThese devices can \"beep\" to remind you of appointments. A book of days to record birthdays, anniversaries, and other occasions that occur on the same date every year   Detailed \"to-do\" lists and strategically placed sticky notes   Quick \"study\" sessionsBefore a gathering, review who will be there so their names will be fresh in your mind. Establish routinesFor example, keep your keys, wallet, and umbrella in the same place all the time or take medicine with your 8:00 AM glass of juice   Live a Healthy Life   Many actions that will keep your body strong will do the same for your mind. For example:   Talk to Your Doctor About Herbs and Supplements    Malnutrition and vitamin deficiencies can impair your mental function. For example, vitamin B12 deficiency can cause a range of symptoms, including confusion. But, what if your nutritional needs are being met? Can herbs and supplements still offer a benefit? Researchers have investigated a range of natural remedies, such as ginkgo , ginseng , and the supplement phosphatidylserine (PS). So far, though, the evidence is inconsistent as to whether these products can improve memory or thinking. If you are interested in taking herbs and supplements, talk to your doctor first because they may interact with other medicines that you are taking. Exercise Regularly    Among the many benefits of regular exercise are increased blood flow to the brain and decreased risk of certain diseases that can interfere with memory function. One study found that even moderate exercise has a beneficial effect. Examples of \"moderate\" exercise include:   Playing 18 holes of golf once a week, without a cart   Playing tennis twice a week   Walking one mile per day   Manage Stress    It can be tough to remember what is important when your mind is cluttered. Make time for relaxation. Choose activities that calm you down, and make it routine.    Manage Chronic Conditions    Side effects of high blood pressure , diabetes, and heart disease can interfere with mental function. Many of the lifestyle steps discussed here can help manage these conditions. Strive to eat a healthy diet, exercise regularly, get stress under control, and follow your doctor's advice for your condition. Minimize Medications    Talk to your doctor about the medicines that you take. Some may be unnecessary. Also, healthy lifestyle habits may lower the need for certain drugs. Last Reviewed: April 2010 Magalis Zee MD   Updated: 4/13/2010     Keeping Home a [address] [facility]       As we get older, changes in balance, gait, strength, vision, hearing, and cognition make even the most youthful senior more prone to accidents. Falls are one of the leading health risks for older people. This increased risk of falling is related to:   Aging process (eg, decreased muscle strength, slowed reflexes)   Higher incidence of chronic health problems (eg, arthritis, diabetes) that may limit mobility, agility or sensory awareness   Side effects of medicine (eg, dizziness, blurred vision)especially medicines like prescription pain medicines and drugs used to treat mental health conditions   Depending on the brittleness of your bones, the consequences of a fall can be serious and long lasting. Home Life   Research by the Association of A[employer]) shows that some home accidents among older adults can be prevented by making simple lifestyle changes and basic modifications and repairs to the home environment. Here are some lifestyle changes that experts recommend:   Have your hearing and vision checked regularly. Be sure to wear prescription glasses that are right for you. Speak to your doctor or pharmacist about the possible side effects of your medicines. A number of medicines can cause dizziness. If you have problems with sleep, talk to your doctor. Limit your intake of alcohol. If necessary, use a cane or walker to help maintain your balance.    Wear supportive, rubber-soled shoes, even at home. If you live in a region that gets wintry weather, you may want to put special cleats on your shoes to prevent you from slipping on the snow and ice. Exercise regularly to help maintain muscle tone, agility, and balance. Always hold the banister when going up or down stairs. Also, use  bars when getting in or out of the bath or shower, or using the toilet. To avoid dizziness, get up slowly from a lying down position. Sit up first, dangling your legs for a minute or two before rising to a standing position. Overall Home Safety Check   According to the Consumer Product Safety Commision's \"Older Consumer Home Safety Checklist,\" it is important to check for potential hazards in each room. And remember, proper lighting is an essential factor in home safety. If you cannot see clearly, you are more likely to fall. Important questions to ask yourself include:   Are lamp, electric, extension, and telephone cords placed out of the flow of traffic and maintained in good condition? Have frayed cords been replaced? Are all small rugs and runners slip resistant? If not, you can secure them to the floor with a special double-sided carpet tape. Are smoke detectors properly locatedone on every floor of your home and one outside of every sleeping area? Are they in good working order? Are batteries replaced at least once a year? Do you have a well-maintained carbon monoxide detector outside every sleeping are in your home? Does your furniture layout leave plenty of space to maneuver between and around chairs, tables, beds, and sofas? Are hallways, stairs and passages between rooms well lit? Can you reach a lamp without getting out of bed? Are floor surfaces well maintained? Shag rugs, high-pile carpeting, tile floors, and polished wood floors can be particularly slippery. Stairs should always have handrails and be carpeted or fitted with a non-skid tread. Is your telephone easily reachable.  Is the cord safely tucked away? Room by Room   According to the Association of Aging, bathrooms and giselle are the two most potentially hazardous rooms in your home. In the Kitchen    Be sure your stove is in proper working order and always make sure burners and the oven are off before you go out or go to sleep. Keep pots on the back burners, turn handles away from the front of the stove, and keep stove clean and free of grease build-up. Kitchen ventilation systems and range exhausts should be working properly. Keep flammable objects such as towels and pot holders away from the cooking area except when in use. Make sure kitchen curtains are tied back. Move cords and appliances away from the sink and hot surfaces. If extension cords are needed, install wiring guides so they do not hang over the sink, range, or working areas. Look for coffee pots, kettles and toaster ovens with automatic shut-offs. Keep a mop handy in the kitchen so you can wipe up spills instantly. You should also have a small fire extinguisher. Arrange your kitchen with frequently used items on lower shelves to avoid the need to stand on a stepstool to reach them. Make sure countertops are well-lit to avoid injuries while cutting and preparing food. In the Bathroom    Use a non-slip mat or decals in the tub and shower, since wet, soapy tile or porcelain surfaces are extremely slippery. Make sure bathroom rugs are non-skid or tape them firmly to the floor. Bathtubs should have at least one, preferably two, grab bars, firmly attached to structural supports in the wall. (Do not use built-in soap holders or glass shower doors as grab bars.)    Tub seats fitted with non-slip material on the legs allow you to wash sitting down. For people with limited mobility, bathtub transfer benches allow you to slide safely into the tub. Raised toilet seats and toilet safety rails are helpful for those with knee or hip problems.     In the Bedroom    Make sure you use a nightlight and that the area around your bed is clear of potential obstacles. Be careful with electric blankets and never go to sleep with a heating pad, which can cause serious burns even if on a low setting. Use fire-resistant mattress covers and pillows, and NEVER smoke in bed. Keep a phone next to the bed that is programmed to dial 911 at the push of a button. If you have a chronic condition, you may want to sign on with an automatic call-in service. Typically the system includes a small pendant that connects directly to an emergency medical voice-response system. You should also make arrangements to stay in contact with someonefriend, neighbor, family memberon a regular schedule. Fire Prevention   According to the [employer]. (Smoke Alarms for Every) [address] [place] [address], senior citizens are one of the two highest risk groups for death and serious injuries due to residential fires. When cooking, wear short-sleeved items, never a bulky long-sleeved robe. The [facility]'s Safety Checklist for Older Consumers emphasizes the importance of checking basements, garages, workshops and storage areas for fire hazards, such as volatile liquids, piles of old rags or clothing and overloaded circuits. Never smoke in bed or when lying down on a couch or recliner chair. Small portable electric or kerosene heaters are responsible for many home fires and should be used cautiously if at all. If you do use one, be sure to keep them away from flammable materials. In case of fire, make sure you have a pre-established emergency exit plan. Have a professional check your fireplace and other fuel-burning appliances yearly. Helping Hands   Baby boomers entering the chatterjee years will continue to see the development of new products to help older adults live safely and independently in spite of age-related changes.   Making Life More Livable  , by [employer], lists over 1,000 products for \"living well in the mature years,\" such as bathing and mobility aids, household security devices, ergonomically designed knives and peelers, and faucet valves and knobs for temperature control. Medical supply stores and organizations are good sources of information about products that improve your quality of life and insure your safety.      Last Reviewed: November 2009 Guille Morton MD   Updated: 3/7/2011

## 2022-10-28 NOTE — PROGRESS NOTES
Medicare Annual Wellness Visit    Endy Parson is here for Medicare AWV    Assessment & Plan   Medicare annual wellness visit, subsequent    Recommendations for Preventive Services Due: see orders and patient instructions/AVS.  Recommended screening schedule for the next 5-10 years is provided to the patient in written form: see Patient Instructions/AVS.     No follow-ups on file. Subjective       Patient's complete Health Risk Assessment and screening values have been reviewed and are found in Flowsheets. The following problems were reviewed today and where indicated follow up appointments were made and/or referrals ordered.     Positive Risk Factor Screenings with Interventions:             General Health and ACP:  General  In general, how would you say your health is?: Fair  In the past 7 days, have you experienced any of the following: New or Increased Pain, New or Increased Fatigue, Loneliness, Social Isolation, Stress or Anger?: (!) Yes  Select all that apply: (!) New or Increased Pain (rt knee pain comes and goes)  Do you get the social and emotional support that you need?: Yes  Do you have a Living Will?: (!) No    Advance Directives       Power of  Living Will ACP-Advance Directive ACP-Power of     Not on File Not on File Not on File Not on File          General Health Risk Interventions:  Pain issues: patient declines any further evaluation/treatment for this issue, patient states right knee pain that comes and goes  No Living Will: [address] addressed with patient today    Health Habits/Nutrition:  Physical Activity: Sufficiently Active    Days of Exercise per Week: 4 days    Minutes of Exercise per Session: 40 min     Have you lost any weight without trying in the past 3 months?: No  Body mass index: (!) 26.07  Have you seen the dentist within the past year?: (!) No  Health Habits/Nutrition Interventions:  Nutritional issues:  patient is not ready to address his/her nutritional/weight issues at this time  Dental exam overdue:  patient encouraged to make appointment with his/her dentist    Hearing/Vision:  Do you or your family notice any trouble with your hearing that hasn't been managed with hearing aids?: (!) Yes (hearing test has been done, has trouble with higher pitched voices)  Do you have difficulty driving, watching TV, or doing any of your daily activities because of your eyesight?: No  Have you had an eye exam within the past year?: Yes  No results found. Hearing/Vision Interventions:  Hearing concerns:  patient declines any further evaluation/treatment for hearing issues, patient states he has had a hearing test and has difficulty with higher pitched voices, but, states he is not ready for hearing aids. Objective   Vitals:    10/28/22 1026   BP: 134/86   Site: Left Upper Arm   Position: Sitting   Cuff Size: Medium Adult   Pulse: 67   SpO2: 97%   Weight: 174 lb (78.9 kg)   Height: 5' 8.5\" (1.74 m)      Body mass index is 26.07 kg/m². Allergies   Allergen Reactions    Antihistamines, Chlorpheniramine-Type      Tachycardia      Pneumovax 23 [Pneumococcal Vac Polyvalent]      Body aches, redness at injection site     Prior to Visit Medications    Medication Sig Taking?  Authorizing Provider   valsartan (DIOVAN) 80 MG tablet Take 1 tablet by mouth daily Yes Jose Angel Youngblood MD   amLODIPine (NORVASC) 5 MG tablet Take 1 tablet by mouth daily Yes Jose Angel Youngblood MD   atorvastatin (LIPITOR) 40 MG tablet TAKE ONE TABLET BY MOUTH ONCE DAILY Yes Jose Angel Youngblood MD   sildenafil (VIAGRA) 50 MG tablet Take 1 tablet by mouth as needed for Erectile Dysfunction Yes Jose Angel Youngblood MD   levothyroxine (SYNTHROID) 50 MCG tablet Take 1 tablet by mouth Daily Yes Jose Angel Youngblood MD   pantoprazole (PROTONIX) 20 MG tablet Take 1 tablet by mouth daily  Patient taking differently: Take 20 mg by mouth every other day Yes Jose Angel Youngblood MD   Probiotic Product (ALIGN PO) Take 1 tablet by mouth daily Yes Historical Provider, MD   OMEGA-3 FATTY ACIDS PO Take by mouth daily Yes Historical Provider, MD   aspirin 81 MG tablet Take 1 tablet by mouth daily Yes Osmar Lozano MD   MULTIPLE VITAMIN PO Take 1 tablet by mouth daily. Yes Historical Provider, MD   albuterol sulfate HFA (VENTOLIN HFA) 108 (90 Base) MCG/ACT inhaler Inhale 2 puffs into the lungs 4 times daily as needed for Wheezing  Patient not taking: Reported on 10/28/2022  OLIVER Silva       CareTeam (Including outside providers/suppliers regularly involved in providing care):   Patient Care Team:  Osmar Lozano MD as PCP - General (Internal Medicine)  Osmar Lozano MD as PCP - [facility] Empaneled Provider  Mike Bran MD as Consulting Physician (Ophthalmology)  Félix Reilly MD as Surgeon (Urology)     Reviewed and updated this visit:  Tobacco  Allergies  Meds  Med Hx  Surg Hx  Soc Hx  Fam Hx            I, Favio Stallings LPN, 18/89/1964, performed the documented evaluation under the direct supervision of the attending physician.

## 2023-01-05 ENCOUNTER — OFFICE VISIT (OUTPATIENT)
Dept: INTERNAL MEDICINE CLINIC | Age: 78
End: 2023-01-05
Payer: COMMERCIAL

## 2023-01-05 VITALS
HEART RATE: 72 BPM | DIASTOLIC BLOOD PRESSURE: 64 MMHG | WEIGHT: 171.6 LBS | TEMPERATURE: 97.7 F | RESPIRATION RATE: 16 BRPM | SYSTOLIC BLOOD PRESSURE: 128 MMHG | OXYGEN SATURATION: 97 % | BODY MASS INDEX: 25.71 KG/M2

## 2023-01-05 DIAGNOSIS — I10 ESSENTIAL HYPERTENSION: Primary | ICD-10-CM

## 2023-01-05 DIAGNOSIS — N52.8 OTHER MALE ERECTILE DYSFUNCTION: ICD-10-CM

## 2023-01-05 DIAGNOSIS — E78.00 PURE HYPERCHOLESTEROLEMIA: ICD-10-CM

## 2023-01-05 DIAGNOSIS — R13.19 ESOPHAGEAL DYSPHAGIA: ICD-10-CM

## 2023-01-05 DIAGNOSIS — R93.89 ABNORMAL CT OF THE CHEST: ICD-10-CM

## 2023-01-05 DIAGNOSIS — C67.9 BLADDER CARCINOMA (HCC): ICD-10-CM

## 2023-01-05 DIAGNOSIS — E03.9 ACQUIRED HYPOTHYROIDISM: ICD-10-CM

## 2023-01-05 PROBLEM — M25.561 ACUTE PAIN OF RIGHT KNEE: Status: RESOLVED | Noted: 2022-10-05 | Resolved: 2023-01-05

## 2023-01-05 PROCEDURE — 3078F DIAST BP <80 MM HG: CPT | Performed by: INTERNAL MEDICINE

## 2023-01-05 PROCEDURE — 1123F ACP DISCUSS/DSCN MKR DOCD: CPT | Performed by: INTERNAL MEDICINE

## 2023-01-05 PROCEDURE — 99214 OFFICE O/P EST MOD 30 MIN: CPT | Performed by: INTERNAL MEDICINE

## 2023-01-05 PROCEDURE — 3074F SYST BP LT 130 MM HG: CPT | Performed by: INTERNAL MEDICINE

## 2023-01-05 RX ORDER — SILDENAFIL 50 MG/1
50 TABLET, FILM COATED ORAL PRN
Qty: 4 TABLET | Refills: 3 | Status: SHIPPED | OUTPATIENT
Start: 2023-01-05

## 2023-01-05 ASSESSMENT — ENCOUNTER SYMPTOMS
EYES NEGATIVE: 1
GASTROINTESTINAL NEGATIVE: 1
RESPIRATORY NEGATIVE: 1
ALLERGIC/IMMUNOLOGIC NEGATIVE: 1

## 2023-01-05 ASSESSMENT — PATIENT HEALTH QUESTIONNAIRE - PHQ9
SUM OF ALL RESPONSES TO PHQ QUESTIONS 1-9: 0
SUM OF ALL RESPONSES TO PHQ9 QUESTIONS 1 & 2: 0
SUM OF ALL RESPONSES TO PHQ QUESTIONS 1-9: 0
SUM OF ALL RESPONSES TO PHQ QUESTIONS 1-9: 0
2. FEELING DOWN, DEPRESSED OR HOPELESS: 0
SUM OF ALL RESPONSES TO PHQ QUESTIONS 1-9: 0
1. LITTLE INTEREST OR PLEASURE IN DOING THINGS: 0

## 2023-01-05 NOTE — PROGRESS NOTES
[facility] Chain  Patient's  is 1945  Seen in office on 2023      SUBJECTIVE:  Jeff goetz 69 y. o.year old male presents today   Chief Complaint   Patient presents with    3 Month Follow-Up    Neck Pain     Hurts to turn head in certain directions    Medication Refill     Patient is here for follow-up of hypertension, hyperlipidemia, hypothyroidism  Is complaining of pain in the left side of neck off-and-on in the muscles of multiple times and in certain positions. The pain is mild and does not affect his daily activities. Denies any injury to the neck. There is no numbness or tingling of the arms or weakness. Patient has hypertension. Taking medications No headaches, no chest pain, no palpitations and no dizziness. Patient has hyperlipidemia. Taking medications. No abdominal pain, no nausea or vomiting. No myalgias. Patient has hypothyroidism taking medications regularly. No palpitations and no dizziness. No fatigue  Patient states he had upper and lower teeth were removed and now he is edentulous he is going to get dentures. Taking medications regularly. No side effects noted. Review of Systems   Constitutional: Negative. HENT: Negative. Eyes: Negative. Respiratory: Negative. Cardiovascular: Negative. Gastrointestinal: Negative. Endocrine: Negative. Genitourinary: Negative. Musculoskeletal: Negative. Skin: Negative. Allergic/Immunologic: Negative. Neurological: Negative. Hematological: Negative. Psychiatric/Behavioral: Negative. OBJECTIVE: /64   Pulse 72   Temp 97.7 °F (36.5 °C) (Temporal)   Resp 16   Wt 171 lb 9.6 oz (77.8 kg)   SpO2 97%   BMI 25.71 kg/m²     Wt Readings from Last 3 Encounters:   23 171 lb 9.6 oz (77.8 kg)   10/28/22 174 lb (78.9 kg)   10/05/22 173 lb 3.2 oz (78.6 kg)      GENERAL: - Alert, oriented, pleasant, in no apparent distress. HEENT: - Conjunctiva pink, no scleral icterus. ENT clear.   NECK: -Supple. No jugular venous distention noted. No masses felt,  No tenderness of the neck. Range of motion's are intact. No carotid bruit. No masses felt  CARDIOVASCULAR: - Normal S1 and S2    PULMONARY: - No respiratory distress. No wheezes or rales. ABDOMEN: - Soft and non-tender,no masses  ororganomegaly. EXTREMITIES: - No cyanosis, clubbing, or significant edema. SKIN: Skin is warm and dry. NEUROLOGICAL: - Cranial nerves II through XII are grossly intact. IMPRESSION:    Encounter Diagnoses   Name Primary? Essential hypertension Yes    Abnormal CT of the chest     Other male erectile dysfunction     Acquired hypothyroidism     Pure hypercholesterolemia     Bladder carcinoma (HCC)     Esophageal dysphagia        ASSESSMENT/PLAN:    1. Essential hypertension  Overview:  Patient on diovan 80 mg daily  and amlodipine 5 mg daily   Hypertension in control. Follow low salt diet. Continue current treatment. 2. Abnormal CT of the chest  Overview:  CT chest showed 5/2022  : IUP ? Pt wants to wait  for referral. To pulmnologist   Refused again on 7/5/22  Refused again :  no SOB . No cough     3. Other male erectile dysfunction  Overview:  H/o ED > seen Dr Lauren Hobbs in the past. Verena Session in the past . He had no side effects to medications. On viagra 50 mg daily   EKG is normal.   No problems taking it. 4. Acquired hypothyroidism  Overview:  Patient is on levothyroxine 50 mcg daily  Patient is stable. Continue current treatment. 5. Pure hypercholesterolemia  Overview:  Patient is on atorvastatin 40 mg daily  Hyperlipidemia is stable. Follow low cholesterol diet. Continue current treatment. 6. Bladder carcinoma ([facility] [address]  Overview:  Pt had TCC of lateral bladder wall Dx 12/18. Had treatment   Dr Lauren Hobbs seeing patient. Having cystoscopy periodically   7. Esophageal dysphagia  Overview:  Patient has esophageal dysphagia. ct chest is normal. UGI mild HH and dysmotility . On protonix 20 mg every other daily : no dysphagia. Pt wants to stop taking it and see how he does     8. Patient has mild neck pain on the left side. Advised to take Tylenol as needed and Voltaren gel as needed  With the pain gets worse we will get x-rays taken x-rays  Orders Placed This Encounter   Procedures    TSH    Comprehensive Metabolic Panel    Lipid, Fasting         Mediations reviewed with the patient. Continue current medications. Appropriate prescriptions are addressed. After visit summeryprovided. Follow up as directed sooner if needed. Questions answered and patient verbalizes understanding. Call for any problems, questions, or concerns. Allergies   Allergen Reactions    Antihistamines, Chlorpheniramine-Type      Tachycardia      Pneumovax 23 [Pneumococcal Vac Polyvalent]      Body aches, redness at injection site     Current Outpatient Medications   Medication Sig Dispense Refill    valsartan (DIOVAN) 80 MG tablet Take 1 tablet by mouth daily 90 tablet 1    amLODIPine (NORVASC) 5 MG tablet Take 1 tablet by mouth daily 90 tablet 1    atorvastatin (LIPITOR) 40 MG tablet TAKE ONE TABLET BY MOUTH ONCE DAILY 90 tablet 1    sildenafil (VIAGRA) 50 MG tablet Take 1 tablet by mouth as needed for Erectile Dysfunction 5 tablet 0    levothyroxine (SYNTHROID) 50 MCG tablet Take 1 tablet by mouth Daily 90 tablet 1    pantoprazole (PROTONIX) 20 MG tablet Take 1 tablet by mouth daily (Patient taking differently: Take 20 mg by mouth every other day) 90 tablet 1    Probiotic Product (ALIGN PO) Take 1 tablet by mouth daily      OMEGA-3 FATTY ACIDS PO Take by mouth daily      aspirin 81 MG tablet Take 1 tablet by mouth daily 30 tablet 5    MULTIPLE VITAMIN PO Take 1 tablet by mouth daily. No current facility-administered medications for this visit.      Past Medical History:   Diagnosis Date    Benign prostatic hyperplasia with urinary frequency 11/9/2017    Patient is taking [place]met[place] combinations    Bladder carcinoma ([facility] [address] 2019    Pt had TCC of lateral bladder wall. Had treatment  Dr Brooklyn Alcaraz seeing patient. Bladder tumor 10/15/2018    Ultrasound of the kidneys and bladder on 2018 showed a 2 cm tumor in the bladder. Patient is referred to urologist Dr. Brooklyn Alcaraz. H/O colonoscopy 3/2012    Dr. Emmy Pratt (hyperlipidemia)     Hypothyroidism     Positive colorectal cancer screening using Cologuard test 2020    Dr Chandrika Bazzi  Colonoscopy : 2021 : adenoma. F/u in 5 years       Past Surgical History:   Procedure Laterality Date    APPENDECTOMY  age 24    CATARACT REMOVAL Left 2022    DR Herberth Ortiz    CATARACT REMOVAL Right 2022    COLONOSCOPY  2012     DR. Chan    COLONOSCOPY  2021    repeat in 5 yrs per Dr. Nubia Monge  2022     Social History     Tobacco Use    Smoking status: Former     Packs/day: 1.00     Years: 35.00     Pack years: 35.00     Types: Cigarettes     Quit date: 1988     Years since quittin.1    Smokeless tobacco: Never   Substance Use Topics    Alcohol use: No     Alcohol/week: 0.0 standard drinks       LAB REVIEW:  CBC:   Lab Results   Component Value Date/Time    WBC 6.5 2021 07:12 AM    HGB 16.2 2021 07:12 AM    HCT 48.5 2021 07:12 AM     2021 07:12 AM     Lipids:   Lab Results   Component Value Date    HDL 48 2022    LDLCALC 38 2022    LDLDIRECT 53 2021    TRIGLYCFAST 124 2022    CHOLFAST 111 2022     Renal:   Lab Results   Component Value Date/Time    BUN 17 2022 07:00 AM    CREATININE 1.0 2022 07:00 AM     2022 07:00 AM    K 4.7 2022 07:00 AM    ALT 23 2022 07:00 AM    AST 23 2022 07:00 AM    GLUCOSE 109 2021 07:08 AM    GLUF 102 2022 07:00 AM     PT/INR: No results found for: INR  A1C: No results found for: Rei Bryant MD, 2023 , 11:23 AM

## 2023-01-20 ENCOUNTER — HOSPITAL ENCOUNTER (OUTPATIENT)
Age: 78
Discharge: HOME | End: 2023-01-20
Payer: COMMERCIAL

## 2023-01-20 LAB — PROSTATE SPECIFIC ANTIGEN: 2.57 NG/ML (ref 0–4)

## 2023-01-20 PROCEDURE — 36415 COLL VENOUS BLD VENIPUNCTURE: CPT

## 2023-01-20 PROCEDURE — G0103 PSA SCREENING: HCPCS

## 2023-03-02 ENCOUNTER — OFFICE VISIT (OUTPATIENT)
Dept: INTERNAL MEDICINE CLINIC | Age: 78
End: 2023-03-02

## 2023-03-02 VITALS
WEIGHT: 175.8 LBS | HEART RATE: 72 BPM | TEMPERATURE: 98.1 F | BODY MASS INDEX: 26.34 KG/M2 | RESPIRATION RATE: 16 BRPM | DIASTOLIC BLOOD PRESSURE: 76 MMHG | OXYGEN SATURATION: 96 % | SYSTOLIC BLOOD PRESSURE: 138 MMHG

## 2023-03-02 DIAGNOSIS — R35.0 BENIGN PROSTATIC HYPERPLASIA WITH URINARY FREQUENCY: ICD-10-CM

## 2023-03-02 DIAGNOSIS — R10.30 LOWER ABDOMINAL PAIN: Primary | ICD-10-CM

## 2023-03-02 DIAGNOSIS — N40.1 BENIGN PROSTATIC HYPERPLASIA WITH URINARY FREQUENCY: ICD-10-CM

## 2023-03-02 DIAGNOSIS — C67.9 BLADDER CARCINOMA (HCC): ICD-10-CM

## 2023-03-02 RX ORDER — LEVOTHYROXINE SODIUM 0.05 MG/1
50 TABLET ORAL DAILY
Qty: 90 TABLET | Refills: 1 | Status: CANCELLED | OUTPATIENT
Start: 2023-03-02

## 2023-03-02 RX ORDER — LEVOTHYROXINE SODIUM 0.05 MG/1
50 TABLET ORAL DAILY
Qty: 90 TABLET | Refills: 1 | Status: SHIPPED | OUTPATIENT
Start: 2023-03-02

## 2023-03-02 ASSESSMENT — PATIENT HEALTH QUESTIONNAIRE - PHQ9
SUM OF ALL RESPONSES TO PHQ QUESTIONS 1-9: 0
1. LITTLE INTEREST OR PLEASURE IN DOING THINGS: 0
SUM OF ALL RESPONSES TO PHQ9 QUESTIONS 1 & 2: 0
SUM OF ALL RESPONSES TO PHQ QUESTIONS 1-9: 0
2. FEELING DOWN, DEPRESSED OR HOPELESS: 0
SUM OF ALL RESPONSES TO PHQ QUESTIONS 1-9: 0
SUM OF ALL RESPONSES TO PHQ QUESTIONS 1-9: 0

## 2023-03-02 NOTE — PROGRESS NOTES
Tricia Peres  Patient's  is 1945  Seen in office on 3/2/2023      SUBJECTIVE:  Redia Islam sofy 69 y. o.year old male presents today   Chief Complaint   Patient presents with    Abdominal Pain     Bloating and burning sensation lower abd, x 2 days    Medication Refill     Pt has lower abdominal pain , bloated feeling and is complaining of burning sensation in the suprapubic area  No fever or chills. No hematuria. No burning on micturition. Pt has h/o of bladder cancer diagnosed in  and had treatment for that. He follows with Dr. Raya Hahn and has an upcoming appointment in May with him. Last PSA in 2.57   Taking medications regularly. No side effects noted. Review of Systems   Genitourinary:  Negative for difficulty urinating, dysuria, flank pain, frequency, hematuria and urgency. OBJECTIVE: /76   Pulse 72   Temp 98.1 °F (36.7 °C) (Temporal)   Resp 16   Wt 175 lb 12.8 oz (79.7 kg)   SpO2 96%   BMI 26.34 kg/m²     Wt Readings from Last 3 Encounters:   23 175 lb 12.8 oz (79.7 kg)   23 171 lb 9.6 oz (77.8 kg)   10/28/22 174 lb (78.9 kg)      GENERAL: - Alert, oriented, pleasant, in no apparent distress. HEENT: - Conjunctiva pink, no scleral icterus. ENT clear. NECK: -Supple. No jugular venous distention noted. No masses felt,  CARDIOVASCULAR: - Normal S1 and S2    PULMONARY: - No respiratory distress. No wheezes or rales. ABDOMEN: - Soft and non-tender,no masses  ororganomegaly. Abdomen is soft. No suprapubic tenderness  EXTREMITIES: - No cyanosis, clubbing, or significant edema. SKIN: Skin is warm and dry. NEUROLOGICAL: - Cranial nerves II through XII are grossly intact. IMPRESSION:    Encounter Diagnoses   Name Primary? Lower abdominal pain Yes    Benign prostatic hyperplasia with urinary frequency     Bladder carcinoma (HCC)        ASSESSMENT/PLAN:    UA is normal: No nitrites.   No leukocytes and no blood  Advised patient if the symptoms get worse to call  Patient states he is drinking enough fluids. Keep appointment       Mediations reviewed with the patient. Continue current medications. Appropriate prescriptions are addressed. After visit summeryprovided. Follow up as directed sooner if needed. Questions answered and patient verbalizes understanding. Call for any problems, questions, or concerns. Allergies   Allergen Reactions    Antihistamines, Chlorpheniramine-Type      Tachycardia      Pneumovax 23 [Pneumococcal Vac Polyvalent]      Body aches, redness at injection site     Current Outpatient Medications   Medication Sig Dispense Refill    sildenafil (VIAGRA) 50 MG tablet Take 1 tablet by mouth as needed for Erectile Dysfunction 4 tablet 3    valsartan (DIOVAN) 80 MG tablet Take 1 tablet by mouth daily 90 tablet 1    amLODIPine (NORVASC) 5 MG tablet Take 1 tablet by mouth daily 90 tablet 1    atorvastatin (LIPITOR) 40 MG tablet TAKE ONE TABLET BY MOUTH ONCE DAILY 90 tablet 1    levothyroxine (SYNTHROID) 50 MCG tablet Take 1 tablet by mouth Daily 90 tablet 1    Probiotic Product (ALIGN PO) Take 1 tablet by mouth daily      OMEGA-3 FATTY ACIDS PO Take by mouth daily      aspirin 81 MG tablet Take 1 tablet by mouth daily 30 tablet 5    MULTIPLE VITAMIN PO Take 1 tablet by mouth daily. No current facility-administered medications for this visit. Past Medical History:   Diagnosis Date    Benign prostatic hyperplasia with urinary frequency 11/9/2017    Patient is taking Saw Palmetto combinations    Bladder carcinoma ([facility] [address] 2/6/2019    Pt had TCC of lateral bladder wall. Had treatment  Dr Whitley Cox seeing patient. Bladder tumor 10/15/2018    Ultrasound of the kidneys and bladder on 9/11/2018 showed a 2 cm tumor in the bladder. Patient is referred to urologist Dr. Whitley Cox.     H/O colonoscopy 3/2012    Dr. Mirella Conway (hyperlipidemia)     Hypothyroidism     Positive colorectal cancer screening using Cologuard test 11/24/2020     Sivan  Colonoscopy : 2021 : adenoma. F/u in 5 years       Past Surgical History:   Procedure Laterality Date    APPENDECTOMY  age 24    CATARACT REMOVAL Left 2022    DR Riojas Led    CATARACT REMOVAL Right 2022    COLONOSCOPY  2012     DR. Chan    COLONOSCOPY  2021    repeat in 5 yrs per Dr. Montgomery Nose  2022     Social History     Tobacco Use    Smoking status: Former     Packs/day: 1.00     Years: 35.00     Pack years: 35.00     Types: Cigarettes     Quit date: 1988     Years since quittin.2    Smokeless tobacco: Never   Substance Use Topics    Alcohol use: No     Alcohol/week: 0.0 standard drinks       LAB REVIEW:  CBC:   Lab Results   Component Value Date/Time    WBC 6.5 2021 07:12 AM    HGB 16.2 2021 07:12 AM    HCT 48.5 2021 07:12 AM     2021 07:12 AM     Lipids:   Lab Results   Component Value Date    HDL 48 2022    LDLCALC 38 2022    LDLDIRECT 53 2021    TRIGLYCFAST 124 2022    CHOLFAST 111 2022     Renal:   Lab Results   Component Value Date/Time    BUN 17 2022 07:00 AM    CREATININE 1.0 2022 07:00 AM     2022 07:00 AM    K 4.7 2022 07:00 AM    ALT 23 2022 07:00 AM    AST 23 2022 07:00 AM    GLUCOSE 109 2021 07:08 AM    GLUF 102 2022 07:00 AM     PT/INR: No results found for: INR  A1C: No results found for: Kylee Benites MD, 3/2/2023 , 11:19 AM

## 2023-04-01 ENCOUNTER — HOSPITAL ENCOUNTER (OUTPATIENT)
Age: 78
Discharge: HOME | End: 2023-04-01
Payer: COMMERCIAL

## 2023-04-01 LAB
ALBUMIN SERPL-MCNC: 4.4 GM/DL (ref 3.4–5)
ALP BLD-CCNC: 112 IU/L (ref 40–129)
ALT SERPL-CCNC: 19 U/L (ref 10–40)
ANION GAP SERPL CALCULATED.3IONS-SCNC: 12 MMOL/L (ref 4–16)
AST SERPL-CCNC: 21 IU/L (ref 15–37)
BILIRUB SERPL-MCNC: 0.5 MG/DL (ref 0–1)
BUN SERPL-MCNC: 14 MG/DL (ref 6–23)
CALCIUM SERPL-MCNC: 9.9 MG/DL (ref 8.3–10.6)
CHLORIDE BLD-SCNC: 105 MMOL/L (ref 99–110)
CHOLESTEROL, FASTING: 126 MG/DL
CO2: 24 MMOL/L (ref 21–32)
CREAT SERPL-MCNC: 1 MG/DL (ref 0.9–1.3)
GFR SERPL CREATININE-BSD FRML MDRD: >60 ML/MIN/1.73M2
GLUCOSE SERPL-MCNC: 98 MG/DL (ref 70–99)
HDLC SERPL-MCNC: 52 MG/DL
LDLC SERPL CALC-MCNC: 40 MG/DL
POTASSIUM SERPL-SCNC: 4.3 MMOL/L (ref 3.5–5.1)
SODIUM BLD-SCNC: 141 MMOL/L (ref 135–145)
TOTAL PROTEIN: 7.2 GM/DL (ref 6.4–8.2)
TRIGLYCERIDE, FASTING: 172 MG/DL
TSH SERPL DL<=0.005 MIU/L-ACNC: 4.7 UIU/ML (ref 0.27–4.2)

## 2023-04-01 PROCEDURE — 80061 LIPID PANEL: CPT

## 2023-04-01 PROCEDURE — 84443 ASSAY THYROID STIM HORMONE: CPT

## 2023-04-01 PROCEDURE — 36415 COLL VENOUS BLD VENIPUNCTURE: CPT

## 2023-04-01 PROCEDURE — 80053 COMPREHEN METABOLIC PANEL: CPT

## 2023-04-06 ENCOUNTER — OFFICE VISIT (OUTPATIENT)
Dept: INTERNAL MEDICINE CLINIC | Age: 78
End: 2023-04-06
Payer: COMMERCIAL

## 2023-04-06 VITALS
HEART RATE: 76 BPM | RESPIRATION RATE: 16 BRPM | TEMPERATURE: 97 F | BODY MASS INDEX: 26.1 KG/M2 | OXYGEN SATURATION: 99 % | WEIGHT: 174.2 LBS | DIASTOLIC BLOOD PRESSURE: 74 MMHG | SYSTOLIC BLOOD PRESSURE: 162 MMHG

## 2023-04-06 DIAGNOSIS — N52.8 OTHER MALE ERECTILE DYSFUNCTION: ICD-10-CM

## 2023-04-06 DIAGNOSIS — N40.1 BENIGN PROSTATIC HYPERPLASIA WITH URINARY FREQUENCY: ICD-10-CM

## 2023-04-06 DIAGNOSIS — R35.0 BENIGN PROSTATIC HYPERPLASIA WITH URINARY FREQUENCY: ICD-10-CM

## 2023-04-06 DIAGNOSIS — I10 ESSENTIAL HYPERTENSION: Primary | ICD-10-CM

## 2023-04-06 DIAGNOSIS — E03.9 ACQUIRED HYPOTHYROIDISM: ICD-10-CM

## 2023-04-06 DIAGNOSIS — C67.9 BLADDER CARCINOMA (HCC): ICD-10-CM

## 2023-04-06 DIAGNOSIS — E78.00 PURE HYPERCHOLESTEROLEMIA: ICD-10-CM

## 2023-04-06 DIAGNOSIS — R93.89 ABNORMAL CT OF THE CHEST: ICD-10-CM

## 2023-04-06 PROCEDURE — 3074F SYST BP LT 130 MM HG: CPT | Performed by: INTERNAL MEDICINE

## 2023-04-06 PROCEDURE — 99214 OFFICE O/P EST MOD 30 MIN: CPT | Performed by: INTERNAL MEDICINE

## 2023-04-06 PROCEDURE — 1123F ACP DISCUSS/DSCN MKR DOCD: CPT | Performed by: INTERNAL MEDICINE

## 2023-04-06 PROCEDURE — 3078F DIAST BP <80 MM HG: CPT | Performed by: INTERNAL MEDICINE

## 2023-04-06 RX ORDER — LEVOTHYROXINE SODIUM 0.07 MG/1
75 TABLET ORAL DAILY
Qty: 90 TABLET | Refills: 1 | Status: SHIPPED | OUTPATIENT
Start: 2023-04-06

## 2023-04-06 RX ORDER — VALSARTAN 80 MG/1
80 TABLET ORAL DAILY
Qty: 90 TABLET | Refills: 1 | Status: SHIPPED | OUTPATIENT
Start: 2023-04-06

## 2023-04-06 RX ORDER — ATORVASTATIN CALCIUM 40 MG/1
TABLET, FILM COATED ORAL
Qty: 90 TABLET | Refills: 1 | Status: SHIPPED | OUTPATIENT
Start: 2023-04-06

## 2023-04-06 RX ORDER — AMLODIPINE BESYLATE 5 MG/1
5 TABLET ORAL DAILY
Qty: 90 TABLET | Refills: 1 | Status: SHIPPED | OUTPATIENT
Start: 2023-04-06

## 2023-04-06 RX ORDER — PANTOPRAZOLE SODIUM 40 MG/1
40 TABLET, DELAYED RELEASE ORAL
Qty: 90 TABLET | Refills: 0 | Status: SHIPPED | OUTPATIENT
Start: 2023-04-06

## 2023-04-06 NOTE — PROGRESS NOTES
Amanda Shields  Patient's  is 1945  Seen in office on 2023      SUBJECTIVE:  Ita Wilson sofy 69 y. o.year old male presents today   Chief Complaint   Patient presents with    Follow-up    Bloated     Align not helping    Other     Has appt with Dr Juanito Sharma is here for f/u of HTN, HLD bladder cancer   Pt stopped protonix since then has more bloating of the stomach is also drinking and taking more dairy products. Patient states he stopped taking align as it was not helping. Patient has hypertension. Taking medications No headaches, no chest pain, no palpitations and no dizziness. Patient has hyperlipidemia. Taking medications. No abdominal pain, no nausea or vomiting. No myalgias. Patient stated Viagra 50 mg is not helping much and asking if he can take 100 mg. He denies any chest pain. No shortness of breath no palpitations. Patient has bladder cancer and sees Dr. Angélica Yang on a regular basis. Denies any hematuria    Taking medications regularly. No side effects noted. Lab results reviewed  TSH : 4.7  Lpids reviewed tri 172      Review of Systems   Constitutional: Negative. HENT: Negative. Eyes: Negative. Respiratory: Negative. Negative for cough, shortness of breath and wheezing. Cardiovascular:  Negative for chest pain, palpitations and leg swelling. Gastrointestinal:         Bloated feeling. OBJECTIVE: BP (!) 162/74   Pulse 76   Temp 97 °F (36.1 °C) (Temporal)   Resp 16   Wt 174 lb 3.2 oz (79 kg)   SpO2 99%   BMI 26.10 kg/m²     Wt Readings from Last 3 Encounters:   23 174 lb 3.2 oz (79 kg)   23 175 lb 12.8 oz (79.7 kg)   23 171 lb 9.6 oz (77.8 kg)      GENERAL: - Alert, oriented, pleasant, in no apparent distress. HEENT: - Conjunctiva pink, no scleral icterus. ENT clear. NECK: -Supple. No jugular venous distention noted. No masses felt,  CARDIOVASCULAR: - Normal S1 and S2    PULMONARY: - No respiratory distress.   No

## 2023-04-08 ASSESSMENT — ENCOUNTER SYMPTOMS
COUGH: 0
RESPIRATORY NEGATIVE: 1
SHORTNESS OF BREATH: 0
EYES NEGATIVE: 1
WHEEZING: 0

## 2023-07-10 ENCOUNTER — OFFICE VISIT (OUTPATIENT)
Dept: INTERNAL MEDICINE CLINIC | Age: 78
End: 2023-07-10
Payer: COMMERCIAL

## 2023-07-10 VITALS
SYSTOLIC BLOOD PRESSURE: 130 MMHG | WEIGHT: 172 LBS | OXYGEN SATURATION: 99 % | BODY MASS INDEX: 25.48 KG/M2 | RESPIRATION RATE: 18 BRPM | HEIGHT: 69 IN | DIASTOLIC BLOOD PRESSURE: 78 MMHG | HEART RATE: 74 BPM

## 2023-07-10 DIAGNOSIS — R93.89 ABNORMAL CT OF THE CHEST: ICD-10-CM

## 2023-07-10 DIAGNOSIS — E03.9 ACQUIRED HYPOTHYROIDISM: Primary | ICD-10-CM

## 2023-07-10 DIAGNOSIS — E78.00 PURE HYPERCHOLESTEROLEMIA: ICD-10-CM

## 2023-07-10 DIAGNOSIS — N40.1 BENIGN PROSTATIC HYPERPLASIA WITH URINARY FREQUENCY: ICD-10-CM

## 2023-07-10 DIAGNOSIS — R13.19 ESOPHAGEAL DYSPHAGIA: ICD-10-CM

## 2023-07-10 DIAGNOSIS — R35.0 BENIGN PROSTATIC HYPERPLASIA WITH URINARY FREQUENCY: ICD-10-CM

## 2023-07-10 DIAGNOSIS — I10 ESSENTIAL HYPERTENSION: ICD-10-CM

## 2023-07-10 DIAGNOSIS — C67.9 BLADDER CARCINOMA (HCC): ICD-10-CM

## 2023-07-10 PROCEDURE — 99214 OFFICE O/P EST MOD 30 MIN: CPT | Performed by: INTERNAL MEDICINE

## 2023-07-10 PROCEDURE — 3075F SYST BP GE 130 - 139MM HG: CPT | Performed by: INTERNAL MEDICINE

## 2023-07-10 PROCEDURE — 1123F ACP DISCUSS/DSCN MKR DOCD: CPT | Performed by: INTERNAL MEDICINE

## 2023-07-10 PROCEDURE — 3078F DIAST BP <80 MM HG: CPT | Performed by: INTERNAL MEDICINE

## 2023-07-10 RX ORDER — TAMSULOSIN HYDROCHLORIDE 0.4 MG/1
0.4 CAPSULE ORAL NIGHTLY
COMMUNITY
Start: 2023-06-25

## 2023-07-10 RX ORDER — LEVOTHYROXINE SODIUM 0.07 MG/1
75 TABLET ORAL DAILY
Qty: 90 TABLET | Refills: 1 | Status: SHIPPED | OUTPATIENT
Start: 2023-07-10

## 2023-07-10 SDOH — ECONOMIC STABILITY: HOUSING INSECURITY
IN THE LAST 12 MONTHS, WAS THERE A TIME WHEN YOU DID NOT HAVE A STEADY PLACE TO SLEEP OR SLEPT IN A SHELTER (INCLUDING NOW)?: NO

## 2023-07-10 SDOH — ECONOMIC STABILITY: FOOD INSECURITY: WITHIN THE PAST 12 MONTHS, YOU WORRIED THAT YOUR FOOD WOULD RUN OUT BEFORE YOU GOT MONEY TO BUY MORE.: NEVER TRUE

## 2023-07-10 SDOH — ECONOMIC STABILITY: INCOME INSECURITY: HOW HARD IS IT FOR YOU TO PAY FOR THE VERY BASICS LIKE FOOD, HOUSING, MEDICAL CARE, AND HEATING?: NOT VERY HARD

## 2023-07-10 SDOH — ECONOMIC STABILITY: FOOD INSECURITY: WITHIN THE PAST 12 MONTHS, THE FOOD YOU BOUGHT JUST DIDN'T LAST AND YOU DIDN'T HAVE MONEY TO GET MORE.: NEVER TRUE

## 2023-07-25 RX ORDER — SILDENAFIL 50 MG/1
50 TABLET, FILM COATED ORAL PRN
Qty: 4 TABLET | Refills: 3 | Status: SHIPPED | OUTPATIENT
Start: 2023-07-25

## 2023-09-18 RX ORDER — LEVOTHYROXINE SODIUM 0.07 MG/1
75 TABLET ORAL DAILY
Qty: 90 TABLET | Refills: 1 | Status: SHIPPED | OUTPATIENT
Start: 2023-09-18

## 2023-09-18 NOTE — TELEPHONE ENCOUNTER
Patient will out of his levothryoxine before his next appointment in October and would like to know if a refill could be sent to [facility].

## 2023-10-04 ENCOUNTER — HOSPITAL ENCOUNTER (OUTPATIENT)
Age: 78
Discharge: HOME | End: 2023-10-04
Payer: COMMERCIAL

## 2023-10-04 DIAGNOSIS — E03.9 ACQUIRED HYPOTHYROIDISM: ICD-10-CM

## 2023-10-04 DIAGNOSIS — E78.00 PURE HYPERCHOLESTEROLEMIA: ICD-10-CM

## 2023-10-04 DIAGNOSIS — I10 ESSENTIAL HYPERTENSION: ICD-10-CM

## 2023-10-04 LAB
ALBUMIN SERPL-MCNC: 4.2 GM/DL (ref 3.4–5)
ALP BLD-CCNC: 119 IU/L (ref 40–129)
ALT SERPL-CCNC: 21 U/L (ref 10–40)
ANION GAP SERPL CALCULATED.3IONS-SCNC: 9 MMOL/L (ref 4–16)
AST SERPL-CCNC: 22 IU/L (ref 15–37)
BASOPHILS ABSOLUTE: 0.1 K/CU MM
BASOPHILS RELATIVE PERCENT: 1 % (ref 0–1)
BILIRUB SERPL-MCNC: 0.6 MG/DL (ref 0–1)
BUN SERPL-MCNC: 11 MG/DL (ref 6–23)
CALCIUM SERPL-MCNC: 9.6 MG/DL (ref 8.3–10.6)
CHLORIDE BLD-SCNC: 105 MMOL/L (ref 99–110)
CHOLESTEROL, FASTING: 121 MG/DL
CO2: 28 MMOL/L (ref 21–32)
CREAT SERPL-MCNC: 1.1 MG/DL (ref 0.9–1.3)
DIFFERENTIAL TYPE: ABNORMAL
EOSINOPHILS ABSOLUTE: 0.4 K/CU MM
EOSINOPHILS RELATIVE PERCENT: 6.5 % (ref 0–3)
GFR SERPL CREATININE-BSD FRML MDRD: >60 ML/MIN/1.73M2
GLUCOSE SERPL-MCNC: 102 MG/DL (ref 70–99)
HCT VFR BLD CALC: 46 % (ref 42–52)
HDLC SERPL-MCNC: 52 MG/DL
HEMOGLOBIN: 15.1 GM/DL (ref 13.5–18)
IMMATURE NEUTROPHIL %: 0.3 % (ref 0–0.43)
LDLC SERPL CALC-MCNC: 38 MG/DL
LYMPHOCYTES ABSOLUTE: 2.1 K/CU MM
LYMPHOCYTES RELATIVE PERCENT: 33.1 % (ref 24–44)
MCH RBC QN AUTO: 31 PG (ref 27–31)
MCHC RBC AUTO-ENTMCNC: 32.8 % (ref 32–36)
MCV RBC AUTO: 94.5 FL (ref 78–100)
MONOCYTES ABSOLUTE: 0.7 K/CU MM
MONOCYTES RELATIVE PERCENT: 11.5 % (ref 0–4)
PDW BLD-RTO: 13.3 % (ref 11.7–14.9)
PLATELET # BLD: 229 K/CU MM (ref 140–440)
PMV BLD AUTO: 9.1 FL (ref 7.5–11.1)
POTASSIUM SERPL-SCNC: 4.3 MMOL/L (ref 3.5–5.1)
RBC # BLD: 4.87 M/CU MM (ref 4.6–6.2)
SEGMENTED NEUTROPHILS ABSOLUTE COUNT: 3 K/CU MM
SEGMENTED NEUTROPHILS RELATIVE PERCENT: 47.6 % (ref 36–66)
SODIUM BLD-SCNC: 142 MMOL/L (ref 135–145)
TOTAL IMMATURE NEUTOROPHIL: 0.02 K/CU MM
TOTAL PROTEIN: 6.8 GM/DL (ref 6.4–8.2)
TRIGLYCERIDE, FASTING: 155 MG/DL
TSH SERPL DL<=0.005 MIU/L-ACNC: 4.03 UIU/ML (ref 0.27–4.2)
WBC # BLD: 6.2 K/CU MM (ref 4–10.5)

## 2023-10-04 PROCEDURE — 80061 LIPID PANEL: CPT

## 2023-10-04 PROCEDURE — 36415 COLL VENOUS BLD VENIPUNCTURE: CPT

## 2023-10-04 PROCEDURE — 84443 ASSAY THYROID STIM HORMONE: CPT

## 2023-10-04 PROCEDURE — 85025 COMPLETE CBC W/AUTO DIFF WBC: CPT

## 2023-10-04 PROCEDURE — 80053 COMPREHEN METABOLIC PANEL: CPT

## 2023-10-10 ENCOUNTER — OFFICE VISIT (OUTPATIENT)
Dept: INTERNAL MEDICINE CLINIC | Age: 78
End: 2023-10-10
Payer: COMMERCIAL

## 2023-10-10 VITALS
WEIGHT: 171.4 LBS | OXYGEN SATURATION: 98 % | DIASTOLIC BLOOD PRESSURE: 74 MMHG | TEMPERATURE: 97.3 F | HEART RATE: 88 BPM | BODY MASS INDEX: 25.68 KG/M2 | RESPIRATION RATE: 16 BRPM | SYSTOLIC BLOOD PRESSURE: 122 MMHG

## 2023-10-10 DIAGNOSIS — R93.89 ABNORMAL CT OF THE CHEST: ICD-10-CM

## 2023-10-10 DIAGNOSIS — E03.9 ACQUIRED HYPOTHYROIDISM: ICD-10-CM

## 2023-10-10 DIAGNOSIS — C67.9 BLADDER CARCINOMA (HCC): ICD-10-CM

## 2023-10-10 DIAGNOSIS — E78.00 PURE HYPERCHOLESTEROLEMIA: ICD-10-CM

## 2023-10-10 DIAGNOSIS — I10 ESSENTIAL HYPERTENSION: Primary | ICD-10-CM

## 2023-10-10 DIAGNOSIS — R35.0 BENIGN PROSTATIC HYPERPLASIA WITH URINARY FREQUENCY: ICD-10-CM

## 2023-10-10 DIAGNOSIS — N40.1 BENIGN PROSTATIC HYPERPLASIA WITH URINARY FREQUENCY: ICD-10-CM

## 2023-10-10 PROCEDURE — 99214 OFFICE O/P EST MOD 30 MIN: CPT | Performed by: INTERNAL MEDICINE

## 2023-10-10 PROCEDURE — 1123F ACP DISCUSS/DSCN MKR DOCD: CPT | Performed by: INTERNAL MEDICINE

## 2023-10-10 PROCEDURE — 3074F SYST BP LT 130 MM HG: CPT | Performed by: INTERNAL MEDICINE

## 2023-10-10 PROCEDURE — 3078F DIAST BP <80 MM HG: CPT | Performed by: INTERNAL MEDICINE

## 2023-10-10 RX ORDER — AMLODIPINE BESYLATE 5 MG/1
5 TABLET ORAL DAILY
Qty: 90 TABLET | Refills: 1 | Status: SHIPPED | OUTPATIENT
Start: 2023-10-10

## 2023-10-10 RX ORDER — ATORVASTATIN CALCIUM 40 MG/1
TABLET, FILM COATED ORAL
Qty: 90 TABLET | Refills: 1 | Status: SHIPPED | OUTPATIENT
Start: 2023-10-10

## 2023-10-10 RX ORDER — VALSARTAN 80 MG/1
80 TABLET ORAL DAILY
Qty: 90 TABLET | Refills: 1 | Status: SHIPPED | OUTPATIENT
Start: 2023-10-10

## 2023-10-10 ASSESSMENT — ENCOUNTER SYMPTOMS
EYES NEGATIVE: 1
RESPIRATORY NEGATIVE: 1
ALLERGIC/IMMUNOLOGIC NEGATIVE: 1
GASTROINTESTINAL NEGATIVE: 1

## 2023-10-10 ASSESSMENT — PATIENT HEALTH QUESTIONNAIRE - PHQ9
1. LITTLE INTEREST OR PLEASURE IN DOING THINGS: 0
SUM OF ALL RESPONSES TO PHQ QUESTIONS 1-9: 0
2. FEELING DOWN, DEPRESSED OR HOPELESS: 0
SUM OF ALL RESPONSES TO PHQ9 QUESTIONS 1 & 2: 0
SUM OF ALL RESPONSES TO PHQ QUESTIONS 1-9: 0

## 2023-11-17 ENCOUNTER — OFFICE VISIT (OUTPATIENT)
Dept: INTERNAL MEDICINE CLINIC | Age: 78
End: 2023-11-17
Payer: COMMERCIAL

## 2023-11-17 VITALS
WEIGHT: 171.6 LBS | SYSTOLIC BLOOD PRESSURE: 122 MMHG | HEIGHT: 69 IN | HEART RATE: 68 BPM | OXYGEN SATURATION: 97 % | DIASTOLIC BLOOD PRESSURE: 64 MMHG | BODY MASS INDEX: 25.42 KG/M2

## 2023-11-17 DIAGNOSIS — Z00.00 MEDICARE ANNUAL WELLNESS VISIT, SUBSEQUENT: Primary | ICD-10-CM

## 2023-11-17 PROCEDURE — 1123F ACP DISCUSS/DSCN MKR DOCD: CPT | Performed by: GENERAL PRACTICE

## 2023-11-17 PROCEDURE — 3078F DIAST BP <80 MM HG: CPT | Performed by: GENERAL PRACTICE

## 2023-11-17 PROCEDURE — 3074F SYST BP LT 130 MM HG: CPT | Performed by: GENERAL PRACTICE

## 2023-11-17 PROCEDURE — G0439 PPPS, SUBSEQ VISIT: HCPCS | Performed by: GENERAL PRACTICE

## 2023-11-17 ASSESSMENT — PATIENT HEALTH QUESTIONNAIRE - PHQ9
SUM OF ALL RESPONSES TO PHQ QUESTIONS 1-9: 0
2. FEELING DOWN, DEPRESSED OR HOPELESS: 0
1. LITTLE INTEREST OR PLEASURE IN DOING THINGS: 0
SUM OF ALL RESPONSES TO PHQ QUESTIONS 1-9: 0
SUM OF ALL RESPONSES TO PHQ9 QUESTIONS 1 & 2: 0

## 2023-11-17 ASSESSMENT — LIFESTYLE VARIABLES
HOW OFTEN DO YOU HAVE A DRINK CONTAINING ALCOHOL: NEVER
HOW MANY STANDARD DRINKS CONTAINING ALCOHOL DO YOU HAVE ON A TYPICAL DAY: PATIENT DOES NOT DRINK

## 2024-01-10 ENCOUNTER — OFFICE VISIT (OUTPATIENT)
Dept: INTERNAL MEDICINE CLINIC | Age: 79
End: 2024-01-10
Payer: COMMERCIAL

## 2024-01-10 VITALS
OXYGEN SATURATION: 100 % | TEMPERATURE: 97.7 F | WEIGHT: 173.2 LBS | DIASTOLIC BLOOD PRESSURE: 62 MMHG | HEART RATE: 84 BPM | RESPIRATION RATE: 16 BRPM | BODY MASS INDEX: 25.95 KG/M2 | SYSTOLIC BLOOD PRESSURE: 138 MMHG

## 2024-01-10 DIAGNOSIS — E78.00 PURE HYPERCHOLESTEROLEMIA: ICD-10-CM

## 2024-01-10 DIAGNOSIS — I10 ESSENTIAL HYPERTENSION: Primary | ICD-10-CM

## 2024-01-10 DIAGNOSIS — C67.9 BLADDER CARCINOMA (HCC): ICD-10-CM

## 2024-01-10 DIAGNOSIS — E03.9 ACQUIRED HYPOTHYROIDISM: ICD-10-CM

## 2024-01-10 DIAGNOSIS — R13.19 ESOPHAGEAL DYSPHAGIA: ICD-10-CM

## 2024-01-10 DIAGNOSIS — N52.8 OTHER MALE ERECTILE DYSFUNCTION: ICD-10-CM

## 2024-01-10 DIAGNOSIS — R35.0 BENIGN PROSTATIC HYPERPLASIA WITH URINARY FREQUENCY: ICD-10-CM

## 2024-01-10 DIAGNOSIS — N40.1 BENIGN PROSTATIC HYPERPLASIA WITH URINARY FREQUENCY: ICD-10-CM

## 2024-01-10 PROCEDURE — 3078F DIAST BP <80 MM HG: CPT | Performed by: INTERNAL MEDICINE

## 2024-01-10 PROCEDURE — 99214 OFFICE O/P EST MOD 30 MIN: CPT | Performed by: INTERNAL MEDICINE

## 2024-01-10 PROCEDURE — 3075F SYST BP GE 130 - 139MM HG: CPT | Performed by: INTERNAL MEDICINE

## 2024-01-10 PROCEDURE — 1123F ACP DISCUSS/DSCN MKR DOCD: CPT | Performed by: INTERNAL MEDICINE

## 2024-01-10 RX ORDER — LEVOTHYROXINE SODIUM 0.07 MG/1
75 TABLET ORAL DAILY
Qty: 90 TABLET | Refills: 1 | Status: SHIPPED | OUTPATIENT
Start: 2024-01-10

## 2024-01-10 RX ORDER — SILDENAFIL 100 MG/1
100 TABLET, FILM COATED ORAL PRN
Qty: 4 TABLET | Refills: 3 | Status: SHIPPED | OUTPATIENT
Start: 2024-01-10

## 2024-01-10 ASSESSMENT — PATIENT HEALTH QUESTIONNAIRE - PHQ9
SUM OF ALL RESPONSES TO PHQ QUESTIONS 1-9: 0
2. FEELING DOWN, DEPRESSED OR HOPELESS: 0
SUM OF ALL RESPONSES TO PHQ QUESTIONS 1-9: 0
SUM OF ALL RESPONSES TO PHQ9 QUESTIONS 1 & 2: 0
1. LITTLE INTEREST OR PLEASURE IN DOING THINGS: 0

## 2024-01-10 NOTE — PROGRESS NOTES
Thomas Sapp  Patient's  is 1945  Seen in office on 1/10/2024      SUBJECTIVE:  Thomas goetz 78 y.o.year old male presents today   Chief Complaint   Patient presents with    3 Month Follow-Up    Results     Lab review    Medication Refill    Other     Walking every morning for about 45 minutes     Pt is here for f/u of HTn HLD , hypothyroidism   Pt states he is feeling well  Patient has hypertension. Taking medications No headaches, no chest pain, no palpitations and no dizziness.  Patient has hyperlipidemia. Taking medications. No abdominal pain, no nausea or vomiting. No myalgias.  Pt has hypothyroidism and is on medication.   Pt states he is walking every day for 45 minutes   Pt states viagra is not fulling well and wants that to be increased   Taking medications regularly. No side effects noted.  Jpt states flomax is not helping and will stop it    Review of Systems   Constitutional: Negative.  Negative for chills, diaphoresis and fever.   HENT: Negative.     Eyes: Negative.    Respiratory: Negative.  Negative for cough, shortness of breath and wheezing.    Cardiovascular: Negative.  Negative for chest pain and leg swelling.   Gastrointestinal: Negative.    Endocrine: Negative.    Genitourinary: Negative.    Musculoskeletal: Negative.    Skin: Negative.    Allergic/Immunologic: Negative.    Neurological: Negative.    Hematological: Negative.    Psychiatric/Behavioral: Negative.         OBJECTIVE: /62   Pulse 84   Temp 97.7 °F (36.5 °C) (Temporal)   Resp 16   Wt 78.6 kg (173 lb 3.2 oz)   SpO2 100%   BMI 25.95 kg/m²     Wt Readings from Last 3 Encounters:   01/10/24 78.6 kg (173 lb 3.2 oz)   23 77.8 kg (171 lb 9.6 oz)   10/10/23 77.7 kg (171 lb 6.4 oz)      GENERAL: - Alert, oriented, pleasant, in no apparent distress.    HEENT: - Conjunctiva pink, no scleral icterus. ENT clear.  NECK: -Supple.  No jugular venous distention noted. No masses felt,  CARDIOVASCULAR: - Normal S1 and S2

## 2024-02-28 NOTE — PROGRESS NOTES
10/29/2016    TRIG 102 10/29/2016    HDL 67 02/03/2018    LDLDIRECT 64 02/03/2018    TRIGLYCFAST 95 02/03/2018    CHOLESTFAST 137 02/03/2018     Renal:   Lab Results   Component Value Date    BUN 17 02/03/2018    CREATININE 1.2 02/03/2018     02/03/2018    K 4.7 02/03/2018    ALT 24 02/03/2018    AST 26 02/03/2018     PT/INR: No results found for: INR  A1C: No results found for: Giulia Singh MD, 7/2/2018 , 5:47 PM
n/a

## 2024-03-07 RX ORDER — LEVOTHYROXINE SODIUM 0.07 MG/1
75 TABLET ORAL DAILY
Qty: 90 TABLET | Refills: 1 | Status: SHIPPED | OUTPATIENT
Start: 2024-03-07

## 2024-03-07 RX ORDER — VALSARTAN 80 MG/1
80 TABLET ORAL DAILY
Qty: 90 TABLET | Refills: 1 | Status: SHIPPED | OUTPATIENT
Start: 2024-03-07

## 2024-03-29 NOTE — PROGRESS NOTES
Beto Mariano  Patient's  is 1945  Seen in office on 2020      SUBJECTIVE:  Jose Luna sofy 77 y. o.year old male presents today   Chief Complaint   Patient presents with    Hypertension    Discuss Medications     pantoprazole    Other     appt with Dr. Kimmie Sanchez NP on 2020     Patient is here for follow-up of hypertension, hyperlipidemia, esophageal dysmotility and positive Cologuard  Patient had positive Cologuard test.  Discussed with patient on the phone and he requested Dr. Filemon Mercer as gastroenterologist.  He was referred to him and he has an appointment with his nurse practitioner on 2020. Patient denies any abdominal pain. No melena or hematochezia. CT scan of the abdomen had showed diverticulosis, some atherosclerosis. No organ abnormality. Patient has hypothyroidism and is taking the Synthroid. Last TSH was 3.1 on 2020  Patient has hyperlipidemia and is taking atorvastatin. No myalgias. Patient has hypertension. Taking medications No headaches, no chest pain, no palpitations and no dizziness. Patient has BPH and is on oxybutynin. CT scan showed mild BPH  Patient had some dysphagia and upper GI showed esophageal dysmotility and Protonix is helping him. No acid reflux when he is taking that. Taking medications regularly. No side effects noted. Review of Systems   Constitutional: Negative. HENT: Negative. Eyes: Negative. Respiratory: Negative. Cardiovascular: Negative. Gastrointestinal: Negative. Endocrine: Negative. Genitourinary: Negative. Musculoskeletal: Negative. Skin: Negative. Allergic/Immunologic: Negative. Neurological: Negative. Hematological: Negative. Psychiatric/Behavioral: Negative.         OBJECTIVE: /76   Pulse 68   Temp 98.3 °F (36.8 °C) (Oral)   Resp 16   Wt 168 lb 9.6 oz (76.5 kg)   SpO2 98%   BMI 24.90 kg/m²     Wt Readings from Last 3 Encounters:   20 168 lb 9.6 oz (76.5 kg)   20 168 lb 3.2 oz (76.3 kg)   09/16/20 164 lb 3.2 oz (74.5 kg)      GENERAL: - Alert, oriented, pleasant, in no apparent distress. HEENT: - Conjunctiva pink, no scleral icterus. ENT clear. NECK: -Supple. No jugular venous distention noted. No masses felt,  CARDIOVASCULAR: - Normal S1 and S2    PULMONARY: - No respiratory distress. No wheezes or rales. ABDOMEN: - Soft and non-tender,no masses  ororganomegaly. EXTREMITIES: - No cyanosis, clubbing, or significant edema. SKIN: Skin is warm and dry. NEUROLOGICAL: - Cranial nerves II through XII are grossly intact. Colonic diverticulosis, without acute diverticulitis.         No urinary tract calculi or obstruction.         The appendix is not seen, though no secondary signs of appendicitis.         Mild prostate enlargement.         Chronic subpleural changes seen at the lung bases bilaterally likely sequela    of remote bronchiolitis.  No acute consolidation or pleural effusion.                    IMPRESSION:    Encounter Diagnoses   Name Primary?  Positive colorectal cancer screening using Cologuard test Yes    Acquired hypothyroidism     Essential hypertension     Esophageal dysphagia     Benign prostatic hyperplasia with urinary frequency     Bladder carcinoma (HCC)     Pure hypercholesterolemia        ASSESSMENT/PLAN:    Positive colorectal cancer screening using Cologuard test  Refer pt to gastroenterologist of his choice. Needs colonosocopy  Refer to Dr Nelida Fernandez : done     Essential hypertension  Patient has hypertension and is controlled with amlodipine. Continue same. Follow low-salt diet. Pure hypercholesterolemia  Patient has hyperlipidemia continue atorvastatin 40 mg daily. Will check lipid profile in 3 months    Acquired hypothyroidism  Patient has hypothyroidism and is on levothyroxine 50 mcg daily. Will check TSH in 3 months    Benign prostatic hyperplasia with urinary frequency  Symptoms are better.    Oxybutynin:     Bladder carcinoma ([facility] [address]  Pt had TCC of lateral bladder wall Dx 12/18. Had treatment   Dr Cain Nicely seeing patient. Having cystoscopy q 3-6 month    Esophageal dysphagia  Patient has esophageal dysphagia going to the back. ct chest is normal. UGI mild HH and dysmotility . trial of protonix 40 mg daily  Continue the same    Orders Placed This Encounter   Procedures    Comprehensive Metabolic Panel    Lipid, Fasting    TSH without Reflex     Return to office in 3 months. Mediations reviewed with the patient. Continue current medications. Appropriate prescriptions are addressed. After visit summeryprovided. Follow up as directed sooner if needed. Questions answered and patient verbalizes understanding. Call for any problems, questions, or concerns. Allergies   Allergen Reactions    Antihistamines, Chlorpheniramine-Type      Tachycardia      Pneumovax 23 [Pneumococcal Vac Polyvalent]      Body aches, redness at injection site     Current Outpatient Medications   Medication Sig Dispense Refill    amLODIPine (NORVASC) 5 MG tablet Take 1 tablet by mouth daily 90 tablet 1    atorvastatin (LIPITOR) 40 MG tablet TAKE ONE TABLET BY MOUTH ONCE DAILY 90 tablet 1    levothyroxine (LEVOTHROID) 50 MCG tablet Take 1 tablet by mouth Daily 90 tablet 1    pantoprazole (PROTONIX) 20 MG tablet Take 1 tablet by mouth daily 90 tablet 0    oxybutynin (DITROPAN-XL) 5 MG extended release tablet Take 5 mg by mouth nightly      OMEGA-3 FATTY ACIDS PO Take by mouth daily      aspirin 81 MG tablet Take 1 tablet by mouth daily 30 tablet 5    MULTIPLE VITAMIN PO Take 1 tablet by mouth daily. No current facility-administered medications for this visit. Past Medical History:   Diagnosis Date    Benign prostatic hyperplasia with urinary frequency 11/9/2017    Patient is taking Saw Palmetto combinations    Bladder carcinoma ([facility] [address] 2/6/2019    Pt had TCC of lateral bladder wall. Had treatment  Dr Cain Nicely seeing patient.      Bladder tumor 10/15/2018    Ultrasound of the kidneys and bladder on 2018 showed a 2 cm tumor in the bladder. Patient is referred to urologist Dr. Nieves Calvo.  H/O colonoscopy 3/2012    Dr. Avel Velasquez (hyperlipidemia)     Hypothyroidism      Past Surgical History:   Procedure Laterality Date    APPENDECTOMY  age 24    COLONOSCOPY  2012     DR. Chan     Social History     Tobacco Use    Smoking status: Former Smoker     Packs/day: 1.00     Years: 35.00     Pack years: 35.00     Types: Cigarettes     Last attempt to quit: 1988     Years since quittin.0    Smokeless tobacco: Never Used   Substance Use Topics    Alcohol use: No     Alcohol/week: 0.0 standard drinks       LAB REVIEW:  CBC:   Lab Results   Component Value Date    WBC 8.4 2019    HGB 14.6 2019    HCT 43.2 2019     2019     Lipids:   Lab Results   Component Value Date    CHOL 160 10/29/2016    TRIG 102 10/29/2016    HDL 61 2020    LDLDIRECT 64 2020    TRIGLYCFAST 167 (H) 2020     Renal:   Lab Results   Component Value Date    BUN 14 2020    CREATININE 1.2 2020     2020    K 4.7 2020    ALT 26 2020    AST 26 2020    GLUCOSE 93 2020     PT/INR: No results found for: INR  A1C: No results found for: Ashley Cary MD, 2020 , 11:13 AM No

## 2024-04-05 ENCOUNTER — HOSPITAL ENCOUNTER (OUTPATIENT)
Age: 79
Discharge: HOME | End: 2024-04-05
Payer: COMMERCIAL

## 2024-04-05 DIAGNOSIS — I10 ESSENTIAL HYPERTENSION: ICD-10-CM

## 2024-04-05 DIAGNOSIS — E03.9 ACQUIRED HYPOTHYROIDISM: ICD-10-CM

## 2024-04-05 DIAGNOSIS — E78.00 PURE HYPERCHOLESTEROLEMIA: ICD-10-CM

## 2024-04-05 LAB
ALBUMIN SERPL-MCNC: 4.1 GM/DL (ref 3.4–5)
ALP BLD-CCNC: 122 IU/L (ref 40–129)
ALT SERPL-CCNC: 17 U/L (ref 10–40)
ANION GAP SERPL CALCULATED.3IONS-SCNC: 12 MMOL/L (ref 7–16)
AST SERPL-CCNC: 23 IU/L (ref 15–37)
BILIRUB SERPL-MCNC: 0.5 MG/DL (ref 0–1)
BUN SERPL-MCNC: 13 MG/DL (ref 6–23)
CALCIUM SERPL-MCNC: 9.7 MG/DL (ref 8.3–10.6)
CHLORIDE BLD-SCNC: 103 MMOL/L (ref 99–110)
CHOLESTEROL, FASTING: 129 MG/DL
CO2: 25 MMOL/L (ref 21–32)
CREAT SERPL-MCNC: 1 MG/DL (ref 0.9–1.3)
GFR SERPL CREATININE-BSD FRML MDRD: 78 ML/MIN/1.73M2
GLUCOSE SERPL-MCNC: 105 MG/DL (ref 70–99)
HDLC SERPL-MCNC: 52 MG/DL
LDLC SERPL CALC-MCNC: 48 MG/DL
POTASSIUM SERPL-SCNC: 4.5 MMOL/L (ref 3.5–5.1)
SODIUM BLD-SCNC: 140 MMOL/L (ref 135–145)
TOTAL PROTEIN: 7.2 GM/DL (ref 6.4–8.2)
TRIGLYCERIDE, FASTING: 143 MG/DL
TSH SERPL DL<=0.005 MIU/L-ACNC: 3.03 UIU/ML (ref 0.27–4.2)

## 2024-04-05 PROCEDURE — 84443 ASSAY THYROID STIM HORMONE: CPT

## 2024-04-05 PROCEDURE — 80053 COMPREHEN METABOLIC PANEL: CPT

## 2024-04-05 PROCEDURE — 36415 COLL VENOUS BLD VENIPUNCTURE: CPT

## 2024-04-05 PROCEDURE — 80061 LIPID PANEL: CPT

## 2024-04-24 ENCOUNTER — OFFICE VISIT (OUTPATIENT)
Age: 79
End: 2024-04-24
Payer: COMMERCIAL

## 2024-04-24 VITALS
HEART RATE: 82 BPM | WEIGHT: 172 LBS | DIASTOLIC BLOOD PRESSURE: 80 MMHG | OXYGEN SATURATION: 98 % | TEMPERATURE: 97.2 F | RESPIRATION RATE: 16 BRPM | BODY MASS INDEX: 25.77 KG/M2 | SYSTOLIC BLOOD PRESSURE: 148 MMHG

## 2024-04-24 DIAGNOSIS — E78.00 PURE HYPERCHOLESTEROLEMIA: Primary | ICD-10-CM

## 2024-04-24 DIAGNOSIS — R00.2 PALPITATIONS: ICD-10-CM

## 2024-04-24 DIAGNOSIS — E03.9 ACQUIRED HYPOTHYROIDISM: ICD-10-CM

## 2024-04-24 DIAGNOSIS — I10 ESSENTIAL HYPERTENSION: ICD-10-CM

## 2024-04-24 DIAGNOSIS — R13.19 ESOPHAGEAL DYSPHAGIA: ICD-10-CM

## 2024-04-24 DIAGNOSIS — C67.9 BLADDER CARCINOMA (HCC): ICD-10-CM

## 2024-04-24 PROCEDURE — 3077F SYST BP >= 140 MM HG: CPT | Performed by: INTERNAL MEDICINE

## 2024-04-24 PROCEDURE — 99214 OFFICE O/P EST MOD 30 MIN: CPT | Performed by: INTERNAL MEDICINE

## 2024-04-24 PROCEDURE — 3079F DIAST BP 80-89 MM HG: CPT | Performed by: INTERNAL MEDICINE

## 2024-04-24 PROCEDURE — 1123F ACP DISCUSS/DSCN MKR DOCD: CPT | Performed by: INTERNAL MEDICINE

## 2024-04-24 RX ORDER — ATORVASTATIN CALCIUM 40 MG/1
TABLET, FILM COATED ORAL
Qty: 90 TABLET | Refills: 1 | Status: SHIPPED | OUTPATIENT
Start: 2024-04-24

## 2024-04-24 RX ORDER — AMLODIPINE BESYLATE 5 MG/1
5 TABLET ORAL DAILY
Qty: 90 TABLET | Refills: 1 | Status: SHIPPED | OUTPATIENT
Start: 2024-04-24

## 2024-04-24 ASSESSMENT — PATIENT HEALTH QUESTIONNAIRE - PHQ9
2. FEELING DOWN, DEPRESSED OR HOPELESS: NOT AT ALL
SUM OF ALL RESPONSES TO PHQ QUESTIONS 1-9: 0
1. LITTLE INTEREST OR PLEASURE IN DOING THINGS: NOT AT ALL
SUM OF ALL RESPONSES TO PHQ9 QUESTIONS 1 & 2: 0
SUM OF ALL RESPONSES TO PHQ QUESTIONS 1-9: 0

## 2024-04-24 NOTE — PROGRESS NOTES
Thomas Sapp  Patient's  is 1945  Seen in office on 2024      SUBJECTIVE:  Thomas goetz 78 y.o.year old male presents today   Chief Complaint   Patient presents with    3 Month Follow-Up    Results     Lab review    Medication Refill    Other     States that he has felt a rapid HR off and on usually after eating or when sitting down. Causing some anxiety. Thinks it might be from the increase in dose of levothyroxine and energy drink     Pt is here for f/u hypertension, hyperlipidemia, hypothyroidism    C/o palpitation that started a month ago. Fett heart beating fast lasts few seconds .  No dizziness or light headedness Usually while sitting down and while eating.  Patient asymptomatic while walking and while standing.  Patient does of levothyroxine was increased last time and he feels it could be from that.  But patient TSH is in the normal range    Patient also has take some energy drinks off-and-on.    Patient had the labs drawn  Patient has hypertension. Taking medications No headaches, no chest pain, no palpitations and no dizziness.  Patient has hyperlipidemia. Taking medications. No abdominal pain, no nausea or vomiting. No myalgias.    Taking medications regularly. No side effects noted.    Review of Systems  Review of system is normal except as in HPI    OBJECTIVE: BP (!) 148/80   Pulse 82   Temp 97.2 °F (36.2 °C) (Temporal)   Resp 16   Wt 78 kg (172 lb)   SpO2 98%   BMI 25.77 kg/m²     Wt Readings from Last 3 Encounters:   24 78 kg (172 lb)   01/10/24 78.6 kg (173 lb 3.2 oz)   23 77.8 kg (171 lb 9.6 oz)      GENERAL: - Alert, oriented, pleasant, in no apparent distress.    HEENT: - Conjunctiva pink, no scleral icterus. ENT clear.  NECK: -Supple.  No jugular venous distention noted. No masses felt,  CARDIOVASCULAR: - Normal S1 and S2    PULMONARY: - No respiratory distress.  No wheezes or rales.    ABDOMEN: - Soft and non-tender,no masses  ororganomegaly.  EXTREMITIES: -

## 2024-05-01 ENCOUNTER — HOSPITAL ENCOUNTER (OUTPATIENT)
Age: 79
Discharge: HOME | End: 2024-05-03
Payer: COMMERCIAL

## 2024-05-01 DIAGNOSIS — R00.2 PALPITATIONS: ICD-10-CM

## 2024-05-01 PROCEDURE — 93225 XTRNL ECG REC<48 HRS REC: CPT

## 2024-05-09 ENCOUNTER — OFFICE VISIT (OUTPATIENT)
Age: 79
End: 2024-05-09
Payer: COMMERCIAL

## 2024-05-09 VITALS
BODY MASS INDEX: 26.22 KG/M2 | RESPIRATION RATE: 16 BRPM | HEART RATE: 80 BPM | OXYGEN SATURATION: 97 % | WEIGHT: 175 LBS | TEMPERATURE: 97.2 F | DIASTOLIC BLOOD PRESSURE: 64 MMHG | SYSTOLIC BLOOD PRESSURE: 144 MMHG

## 2024-05-09 DIAGNOSIS — R00.2 HEART PALPITATIONS: Primary | ICD-10-CM

## 2024-05-09 PROCEDURE — 99213 OFFICE O/P EST LOW 20 MIN: CPT | Performed by: INTERNAL MEDICINE

## 2024-05-09 PROCEDURE — 3077F SYST BP >= 140 MM HG: CPT | Performed by: INTERNAL MEDICINE

## 2024-05-09 PROCEDURE — 1123F ACP DISCUSS/DSCN MKR DOCD: CPT | Performed by: INTERNAL MEDICINE

## 2024-05-09 PROCEDURE — 3078F DIAST BP <80 MM HG: CPT | Performed by: INTERNAL MEDICINE

## 2024-05-09 ASSESSMENT — PATIENT HEALTH QUESTIONNAIRE - PHQ9
SUM OF ALL RESPONSES TO PHQ QUESTIONS 1-9: 0
SUM OF ALL RESPONSES TO PHQ QUESTIONS 1-9: 0
SUM OF ALL RESPONSES TO PHQ9 QUESTIONS 1 & 2: 0
SUM OF ALL RESPONSES TO PHQ QUESTIONS 1-9: 0
SUM OF ALL RESPONSES TO PHQ QUESTIONS 1-9: 0
2. FEELING DOWN, DEPRESSED OR HOPELESS: NOT AT ALL

## 2024-05-09 NOTE — PROGRESS NOTES
Thomas Sapp  Patient's  is 1945  Seen in office on 2024      SUBJECTIVE:  Thomas goetz 78 y.o.year old male presents today   Chief Complaint   Patient presents with    Palpitations     When relaxing, and did it again yesterday, no pain, flutter feeling  Holter monitor removed on 5-3-24     Pt is here for f/u of palpitation  Happened again yesterday since last visit lasted only few seconds less than a minute.  Patient states he was asymptomatic during Holter monitor  No syncope, near syncope, no dizziness.    Patient states he does walk about 45 minutes a day and during that time he does not have any symptoms of dizziness, palpitations, syncope or near syncope.  Symptoms happen only when he is relaxing sitting in one place.    Taking medications regularly. No side effects noted.    Review of Systems    OBJECTIVE: BP (!) 144/64 (Site: Right Upper Arm, Position: Sitting, Cuff Size: Medium Adult)   Pulse 80   Temp 97.2 °F (36.2 °C) (Temporal)   Resp 16   Wt 79.4 kg (175 lb)   SpO2 97%   BMI 26.22 kg/m²     Wt Readings from Last 3 Encounters:   24 79.4 kg (175 lb)   24 78 kg (172 lb)   01/10/24 78.6 kg (173 lb 3.2 oz)      GENERAL: - Alert, oriented, pleasant, in no apparent distress.    HEENT: - Conjunctiva pink, no scleral icterus. ENT clear.  NECK: -Supple.  No jugular venous distention noted. No masses felt,  CARDIOVASCULAR: - Normal S1 and S2    PULMONARY: - No respiratory distress.  No wheezes or rales.    ABDOMEN: - Soft and non-tender,no masses  ororganomegaly.  EXTREMITIES: - No cyanosis, clubbing, or significant edema.  SKIN: Skin is warm and dry.   NEUROLOGICAL: - Cranial nerves II through XII are grossly intact.      IMPRESSION:    Encounter Diagnoses   Name Primary?    Heart palpitations Yes       ASSESSMENT/PLAN:    Refer to cardiology for palpitation  Holter results are not ready yet  Patient to call back on coming Monday for the results.  Return to office in a

## 2024-05-24 ENCOUNTER — OFFICE VISIT (OUTPATIENT)
Age: 79
End: 2024-05-24

## 2024-05-24 VITALS
RESPIRATION RATE: 16 BRPM | DIASTOLIC BLOOD PRESSURE: 70 MMHG | OXYGEN SATURATION: 97 % | TEMPERATURE: 97.2 F | HEART RATE: 68 BPM | WEIGHT: 173 LBS | BODY MASS INDEX: 25.92 KG/M2 | SYSTOLIC BLOOD PRESSURE: 142 MMHG

## 2024-05-24 DIAGNOSIS — I47.10 SVT (SUPRAVENTRICULAR TACHYCARDIA) (HCC): Primary | ICD-10-CM

## 2024-05-24 NOTE — PROGRESS NOTES
Thomas Sapp  Patient's  is 1945  Seen in office on 2024      SUBJECTIVE:  Thomas goetz 79 y.o.year old male presents today   Chief Complaint   Patient presents with    1 Month Follow-Up     Pt has occasional palpitation  Short run of SVT on holter  Has palpitations once a week or so  No syncope or near syncope  Holter as below  Labs normal.  Taking medications regularly. No side effects noted.    Review of Systems    OBJECTIVE: BP (!) 142/70 (Site: Left Upper Arm, Position: Sitting, Cuff Size: Medium Adult)   Pulse 68   Temp 97.2 °F (36.2 °C) (Temporal)   Resp 16   Wt 78.5 kg (173 lb)   SpO2 97%   BMI 25.92 kg/m²     Wt Readings from Last 3 Encounters:   24 78.5 kg (173 lb)   24 79.4 kg (175 lb)   24 78 kg (172 lb)      GENERAL: - Alert, oriented, pleasant, in no apparent distress.    HEENT: - Conjunctiva pink, no scleral icterus. ENT clear.  NECK: -Supple.  No jugular venous distention noted. No masses felt,  CARDIOVASCULAR: - Normal S1 and S2    PULMONARY: - No respiratory distress.  No wheezes or rales.    ABDOMEN: - Soft and non-tender,no masses  ororganomegaly.  EXTREMITIES: - No cyanosis, clubbing, or significant edema.  SKIN: Skin is warm and dry.   NEUROLOGICAL: - Cranial nerves II through XII are grossly intact.      Holter for 48 hours   Monitor worn on 2024 for 48 hours.  Average heart rate 67 lowest heart rate 56 at 9:27 PM highest heart rate was 198 there were 12 ventricular ectopy 94 supraventricular ectopy   no episodes of atrial fibrillation recorded  Short runs of SVT noted which was irregular for 5 beats occasional PACs and PVCs noted  Clinical correlation needed      IMPRESSION:    Encounter Diagnoses   Name Primary?    SVT (supraventricular tachycardia) (HCC) Yes       ASSESSMENT/PLAN:    Short run of SVT  TSH and electrolytes normal  Referred to cardiologist    Medications were reviewed with the patient. Continue current medications.  Appropriate

## 2024-05-29 ENCOUNTER — INITIAL CONSULT (OUTPATIENT)
Dept: CARDIOLOGY CLINIC | Age: 79
End: 2024-05-29
Payer: COMMERCIAL

## 2024-05-29 VITALS
HEIGHT: 69 IN | BODY MASS INDEX: 25.42 KG/M2 | HEART RATE: 66 BPM | DIASTOLIC BLOOD PRESSURE: 82 MMHG | SYSTOLIC BLOOD PRESSURE: 150 MMHG | WEIGHT: 171.6 LBS

## 2024-05-29 DIAGNOSIS — I49.9 IRREGULAR HEART BEAT: Primary | ICD-10-CM

## 2024-05-29 DIAGNOSIS — I47.10 SVT (SUPRAVENTRICULAR TACHYCARDIA) (HCC): ICD-10-CM

## 2024-05-29 PROCEDURE — 3077F SYST BP >= 140 MM HG: CPT | Performed by: INTERNAL MEDICINE

## 2024-05-29 PROCEDURE — 99204 OFFICE O/P NEW MOD 45 MIN: CPT | Performed by: INTERNAL MEDICINE

## 2024-05-29 PROCEDURE — 93000 ELECTROCARDIOGRAM COMPLETE: CPT | Performed by: INTERNAL MEDICINE

## 2024-05-29 PROCEDURE — 3079F DIAST BP 80-89 MM HG: CPT | Performed by: INTERNAL MEDICINE

## 2024-05-29 NOTE — PROGRESS NOTES
CARDIOLOGY CONSULT NOTE   Reason for consultation:  palpitations    Referring physician:  Chris Galeas MD    Primary care physician: Chris Galeas MD      Dear Chris Galeas MD    Thanks for the consult.    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  No chest pain, shortness of breath or leg swelling  Chief Complaint   Patient presents with    Consultation     Consult for irregular Heart Beat per monitor he wore  Denies any chest pain, SOB dizziness, swelling   Complains of a flutter feeling and wore a monitor per his PCP     Blood pressure, cholesterol, blood glucose and weight are well controlled.    Past medical history:    has a past medical history of Benign prostatic hyperplasia with urinary frequency, Bladder carcinoma (HCC), Bladder tumor, H/O colonoscopy, HLD (hyperlipidemia), Hypothyroidism, and Positive colorectal cancer screening using Cologuard test.  Past surgical history:   has a past surgical history that includes Colonoscopy (03/28/2012); Appendectomy (age 20); Colonoscopy (02/05/2021); Cataract removal (Left, 03/24/2022); Cataract removal (Right, 04/14/2022); and Dental surgery (12/2022).  Social History:   reports that he quit smoking about 35 years ago. His smoking use included cigarettes. He started smoking about 70 years ago. He has a 35.0 pack-year smoking history. He has never used smokeless tobacco. He reports that he does not drink alcohol and does not use drugs.  Family history:   no family history of CAD, STROKE of DM    Allergies   Allergen Reactions    Antihistamines, Chlorpheniramine-Type      Tachycardia      Pneumovax 23

## 2024-06-07 ENCOUNTER — HOSPITAL ENCOUNTER (OUTPATIENT)
Age: 79
Discharge: HOME | End: 2024-06-07
Payer: COMMERCIAL

## 2024-06-07 LAB — PROSTATE SPECIFIC ANTIGEN: 3.06 NG/ML (ref 0–4)

## 2024-06-07 PROCEDURE — G0103 PSA SCREENING: HCPCS

## 2024-06-07 PROCEDURE — 36415 COLL VENOUS BLD VENIPUNCTURE: CPT

## 2024-06-10 ENCOUNTER — TELEPHONE (OUTPATIENT)
Dept: CARDIOLOGY CLINIC | Age: 79
End: 2024-06-10

## 2024-06-10 NOTE — TELEPHONE ENCOUNTER
Called to patient the results of recent testing echo -    Left Ventricle: Normal left ventricular systolic function with a visually estimated EF of 55 - 60%. Left ventricle size is normal. Normal wall thickness. Normal wall motion. Grade I diastolic dysfunction with normal LAP.    Mitral Valve: Mild regurgitation.    Tricuspid Valve: Mild regurgitation. Normal RVSP.  Patient verbalized understanding of all information given.

## 2024-07-03 ENCOUNTER — OFFICE VISIT (OUTPATIENT)
Dept: CARDIOLOGY CLINIC | Age: 79
End: 2024-07-03
Payer: COMMERCIAL

## 2024-07-03 VITALS
HEIGHT: 69 IN | SYSTOLIC BLOOD PRESSURE: 130 MMHG | DIASTOLIC BLOOD PRESSURE: 78 MMHG | WEIGHT: 172 LBS | HEART RATE: 76 BPM | BODY MASS INDEX: 25.48 KG/M2

## 2024-07-03 DIAGNOSIS — I49.9 IRREGULAR HEART BEAT: Primary | ICD-10-CM

## 2024-07-03 DIAGNOSIS — R00.2 PALPITATIONS: ICD-10-CM

## 2024-07-03 DIAGNOSIS — I47.10 SVT (SUPRAVENTRICULAR TACHYCARDIA) (HCC): ICD-10-CM

## 2024-07-03 PROCEDURE — 1123F ACP DISCUSS/DSCN MKR DOCD: CPT | Performed by: INTERNAL MEDICINE

## 2024-07-03 PROCEDURE — 3078F DIAST BP <80 MM HG: CPT | Performed by: INTERNAL MEDICINE

## 2024-07-03 PROCEDURE — 99214 OFFICE O/P EST MOD 30 MIN: CPT | Performed by: INTERNAL MEDICINE

## 2024-07-03 PROCEDURE — 3075F SYST BP GE 130 - 139MM HG: CPT | Performed by: INTERNAL MEDICINE

## 2024-07-03 NOTE — PROGRESS NOTES
Jade Mercado MD        OFFICE  FOLLOWUP NOTE    Chief complaints: patient is here for management of SVT, HTN, hypothyroidism, dyslipidemia, ED    HAD USED ONE TIME CARDIZEM SINCE LAST TIME  Subjective: patient feels better, no chest pain, no shortness of breath, no dizziness, no palpitations    SAM Guzman is a 79 y.o.year old who  has a past medical history of Benign prostatic hyperplasia with urinary frequency, Bladder carcinoma (HCC), Bladder tumor, H/O colonoscopy, H/O echocardiogram, H/O exercise stress test, HLD (hyperlipidemia), Hypothyroidism, and Positive colorectal cancer screening using Cologuard test. and presents for management of SVT, HTN, hypothyroidism, dyslipidemia, ED, which are well controlled      Current Outpatient Medications   Medication Sig Dispense Refill    dilTIAZem (CARDIZEM) 30 MG tablet Take 1 tablet by mouth 4 times daily as needed (palpitations) 120 tablet 3    amLODIPine (NORVASC) 5 MG tablet Take 1 tablet by mouth daily 90 tablet 1    atorvastatin (LIPITOR) 40 MG tablet TAKE ONE TABLET BY MOUTH ONCE DAILY 90 tablet 1    levothyroxine (SYNTHROID) 75 MCG tablet Take 1 tablet by mouth Daily 90 tablet 1    valsartan (DIOVAN) 80 MG tablet Take 1 tablet by mouth daily 90 tablet 1    sildenafil (VIAGRA) 100 MG tablet Take 1 tablet by mouth as needed for Erectile Dysfunction 4 tablet 3    OMEGA-3 FATTY ACIDS PO Take by mouth daily      aspirin 81 MG tablet Take 1 tablet by mouth daily 30 tablet 5    MULTIPLE VITAMIN PO Take 1 tablet by mouth daily.       No current facility-administered medications for this visit.     Allergies: Antihistamines, chlorpheniramine-type and Pneumovax 23 [pneumococcal vac polyvalent]  Past Medical History:   Diagnosis Date    Benign prostatic hyperplasia with urinary frequency 11/09/2017    Patient is taking Saw Palmetto combinations    Bladder carcinoma (HCC) 02/06/2019    Pt had TCC of lateral bladder wall. Had treatment  Dr Leyva

## 2024-07-24 ENCOUNTER — OFFICE VISIT (OUTPATIENT)
Age: 79
End: 2024-07-24
Payer: COMMERCIAL

## 2024-07-24 VITALS
DIASTOLIC BLOOD PRESSURE: 64 MMHG | BODY MASS INDEX: 25.28 KG/M2 | WEIGHT: 171.2 LBS | RESPIRATION RATE: 16 BRPM | OXYGEN SATURATION: 97 % | TEMPERATURE: 98.2 F | HEART RATE: 68 BPM | SYSTOLIC BLOOD PRESSURE: 140 MMHG

## 2024-07-24 DIAGNOSIS — E78.00 PURE HYPERCHOLESTEROLEMIA: ICD-10-CM

## 2024-07-24 DIAGNOSIS — I10 ESSENTIAL HYPERTENSION: Primary | ICD-10-CM

## 2024-07-24 DIAGNOSIS — R93.89 ABNORMAL CT OF THE CHEST: ICD-10-CM

## 2024-07-24 DIAGNOSIS — R35.0 BENIGN PROSTATIC HYPERPLASIA WITH URINARY FREQUENCY: ICD-10-CM

## 2024-07-24 DIAGNOSIS — N40.1 BENIGN PROSTATIC HYPERPLASIA WITH URINARY FREQUENCY: ICD-10-CM

## 2024-07-24 DIAGNOSIS — N52.8 OTHER MALE ERECTILE DYSFUNCTION: ICD-10-CM

## 2024-07-24 DIAGNOSIS — I47.10 SVT (SUPRAVENTRICULAR TACHYCARDIA) (HCC): ICD-10-CM

## 2024-07-24 DIAGNOSIS — C67.9 BLADDER CARCINOMA (HCC): ICD-10-CM

## 2024-07-24 DIAGNOSIS — E03.9 ACQUIRED HYPOTHYROIDISM: ICD-10-CM

## 2024-07-24 PROBLEM — R00.2 PALPITATIONS: Status: RESOLVED | Noted: 2024-04-24 | Resolved: 2024-07-24

## 2024-07-24 PROCEDURE — 3077F SYST BP >= 140 MM HG: CPT | Performed by: INTERNAL MEDICINE

## 2024-07-24 PROCEDURE — 1123F ACP DISCUSS/DSCN MKR DOCD: CPT | Performed by: INTERNAL MEDICINE

## 2024-07-24 PROCEDURE — 99214 OFFICE O/P EST MOD 30 MIN: CPT | Performed by: INTERNAL MEDICINE

## 2024-07-24 PROCEDURE — 3078F DIAST BP <80 MM HG: CPT | Performed by: INTERNAL MEDICINE

## 2024-07-24 RX ORDER — LEVOTHYROXINE SODIUM 0.07 MG/1
75 TABLET ORAL DAILY
Qty: 90 TABLET | Refills: 1 | Status: SHIPPED | OUTPATIENT
Start: 2024-07-24

## 2024-07-24 RX ORDER — SILDENAFIL 100 MG/1
100 TABLET, FILM COATED ORAL PRN
Qty: 4 TABLET | Refills: 3 | Status: SHIPPED | OUTPATIENT
Start: 2024-07-24

## 2024-07-24 NOTE — PROGRESS NOTES
Thomas Sapp  Patient's  is 1945  Seen in office on 2024      SUBJECTIVE:  Thomas goetz 79 y.o.year old male presents today   Chief Complaint   Patient presents with    3 Month Follow-Up    Medication Refill     Patient is here for follow-up of hypertension, hyperlipidemia, hypothyroidism and bladder cancer.    Pt states he had cystoscopy attempted by Dr. Leyva but could not pass the cathetor and he is scheduled for dilatation of urethra at [facility] next week.    Patient has hypertension. Taking medications No headaches, no chest pain, no palpitations and no dizziness.    Pt has hypothyroidism and is taking medication    Patient has hyperlipidemia. Taking medications. No abdominal pain, no nausea or vomiting. No myalgias.    Pt is active and he takes 45 minutes walk twice a day  No chest pain. No pedal edema. No SOB  No CHF    Patient has history of SVT for which he underwent stress test and echocardiogram both were normal.  He was given Cardizem 40 mg to be taken as needed by Dr. Addison of the heart rate is more than 140 or if he gets palpitations.  Patient states he used only 3 times in the last couple of months    Dr Stacy cleared him for surgery     Taking medications regularly. No side effects noted.    Review of Systems    Review of system is normal except as in HPI    OBJECTIVE: BP (!) 140/64   Pulse 68   Temp 98.2 °F (36.8 °C) (Oral)   Resp 16   Wt 77.7 kg (171 lb 3.2 oz)   SpO2 97%   BMI 25.28 kg/m²     Wt Readings from Last 3 Encounters:   24 77.7 kg (171 lb 3.2 oz)   24 78 kg (172 lb)   24 77.6 kg (171 lb)      GENERAL: - Alert, oriented, pleasant, in no apparent distress.    HEENT: - Conjunctiva pink, no scleral icterus. ENT clear.  NECK: -Supple.  No jugular venous distention noted. No masses felt,  CARDIOVASCULAR: - Normal S1 and S2    PULMONARY: - No respiratory distress.  No wheezes or rales.    ABDOMEN: - Soft and non-tender,no masses

## 2024-07-25 ENCOUNTER — HOSPITAL ENCOUNTER (OUTPATIENT)
Age: 79
Discharge: HOME | End: 2024-07-25
Payer: COMMERCIAL

## 2024-07-25 LAB
ANION GAP SERPL CALCULATED.3IONS-SCNC: 13 MMOL/L (ref 7–16)
BUN SERPL-MCNC: 18 MG/DL (ref 6–23)
CALCIUM SERPL-MCNC: 10.3 MG/DL (ref 8.3–10.6)
CHLORIDE BLD-SCNC: 101 MMOL/L (ref 99–110)
CO2: 24 MMOL/L (ref 21–32)
CREAT SERPL-MCNC: 0.8 MG/DL (ref 0.9–1.3)
GFR, ESTIMATED: >90 ML/MIN/1.73M2
GLUCOSE SERPL-MCNC: 94 MG/DL (ref 70–99)
HEMOGLOBIN: 15.6 GM/DL (ref 13.5–18)
POTASSIUM SERPL-SCNC: 4.2 MMOL/L (ref 3.5–5.1)
SODIUM BLD-SCNC: 138 MMOL/L (ref 135–145)

## 2024-07-25 PROCEDURE — 36415 COLL VENOUS BLD VENIPUNCTURE: CPT

## 2024-07-25 PROCEDURE — 85018 HEMOGLOBIN: CPT

## 2024-07-25 PROCEDURE — 80048 BASIC METABOLIC PNL TOTAL CA: CPT

## 2024-08-15 ENCOUNTER — TELEPHONE (OUTPATIENT)
Age: 79
End: 2024-08-15

## 2024-08-15 NOTE — TELEPHONE ENCOUNTER
Patient came into office requesting copy of lab results from 07/25/2024 - printed out and given to patient

## 2024-10-09 ENCOUNTER — OFFICE VISIT (OUTPATIENT)
Dept: CARDIOLOGY CLINIC | Age: 79
End: 2024-10-09
Payer: COMMERCIAL

## 2024-10-09 VITALS
DIASTOLIC BLOOD PRESSURE: 74 MMHG | HEIGHT: 69 IN | WEIGHT: 168.4 LBS | HEART RATE: 72 BPM | BODY MASS INDEX: 24.94 KG/M2 | SYSTOLIC BLOOD PRESSURE: 136 MMHG

## 2024-10-09 DIAGNOSIS — R00.2 PALPITATIONS: ICD-10-CM

## 2024-10-09 DIAGNOSIS — I49.9 IRREGULAR HEART BEAT: ICD-10-CM

## 2024-10-09 DIAGNOSIS — I47.10 SVT (SUPRAVENTRICULAR TACHYCARDIA) (HCC): Primary | ICD-10-CM

## 2024-10-09 PROCEDURE — 99214 OFFICE O/P EST MOD 30 MIN: CPT | Performed by: INTERNAL MEDICINE

## 2024-10-09 PROCEDURE — 1123F ACP DISCUSS/DSCN MKR DOCD: CPT | Performed by: INTERNAL MEDICINE

## 2024-10-09 PROCEDURE — 3075F SYST BP GE 130 - 139MM HG: CPT | Performed by: INTERNAL MEDICINE

## 2024-10-09 PROCEDURE — 3078F DIAST BP <80 MM HG: CPT | Performed by: INTERNAL MEDICINE

## 2024-10-09 NOTE — PROGRESS NOTES
Jade Mercado MD        OFFICE  FOLLOWUP NOTE    Chief complaints: patient is here for management of SVT, HTN, hypothyroidism, dyslipidemia, ED     Subjective: patient feels better, no chest pain, no shortness of breath, no dizziness, no palpitations    HPI Thomas is a 79 y.o.year old who  has a past medical history of Benign prostatic hyperplasia with urinary frequency, Bladder carcinoma (HCC), Bladder tumor, H/O colonoscopy, H/O echocardiogram, H/O exercise stress test, HLD (hyperlipidemia), Hypothyroidism, and Positive colorectal cancer screening using Cologuard test. and presents for management of SVT, HTN, hypothyroidism, dyslipidemia, ED  which are well controlled      Current Outpatient Medications   Medication Sig Dispense Refill    levothyroxine (SYNTHROID) 75 MCG tablet Take 1 tablet by mouth Daily 90 tablet 1    sildenafil (VIAGRA) 100 MG tablet Take 1 tablet by mouth as needed for Erectile Dysfunction 4 tablet 3    dilTIAZem (CARDIZEM) 30 MG tablet Take 1 tablet by mouth 4 times daily as needed (palpitations) (Patient taking differently: Take 1 tablet by mouth 4 times daily as needed (palpitations) Has but hasn't had to take) 120 tablet 3    amLODIPine (NORVASC) 5 MG tablet Take 1 tablet by mouth daily 90 tablet 1    atorvastatin (LIPITOR) 40 MG tablet TAKE ONE TABLET BY MOUTH ONCE DAILY 90 tablet 1    valsartan (DIOVAN) 80 MG tablet Take 1 tablet by mouth daily 90 tablet 1    aspirin 81 MG tablet Take 1 tablet by mouth daily 30 tablet 5    MULTIPLE VITAMIN PO Take 1 tablet by mouth daily.      OMEGA-3 FATTY ACIDS PO Take by mouth daily       No current facility-administered medications for this visit.     Allergies: Antihistamines, chlorpheniramine-type and Pneumovax 23 [pneumococcal vac polyvalent]  Past Medical History:   Diagnosis Date    Benign prostatic hyperplasia with urinary frequency 11/09/2017    Patient is taking Saw Palmetto combinations    Bladder carcinoma (HCC)

## 2024-10-24 ENCOUNTER — OFFICE VISIT (OUTPATIENT)
Age: 79
End: 2024-10-24
Payer: COMMERCIAL

## 2024-10-24 VITALS
TEMPERATURE: 97.9 F | HEART RATE: 68 BPM | SYSTOLIC BLOOD PRESSURE: 128 MMHG | RESPIRATION RATE: 16 BRPM | DIASTOLIC BLOOD PRESSURE: 62 MMHG | OXYGEN SATURATION: 98 % | BODY MASS INDEX: 24.78 KG/M2 | WEIGHT: 167.8 LBS

## 2024-10-24 DIAGNOSIS — E03.9 ACQUIRED HYPOTHYROIDISM: ICD-10-CM

## 2024-10-24 DIAGNOSIS — C67.9 BLADDER CARCINOMA (HCC): ICD-10-CM

## 2024-10-24 DIAGNOSIS — E78.00 PURE HYPERCHOLESTEROLEMIA: Primary | ICD-10-CM

## 2024-10-24 DIAGNOSIS — R13.19 ESOPHAGEAL DYSPHAGIA: ICD-10-CM

## 2024-10-24 DIAGNOSIS — I10 ESSENTIAL HYPERTENSION: ICD-10-CM

## 2024-10-24 DIAGNOSIS — I47.10 SVT (SUPRAVENTRICULAR TACHYCARDIA) (HCC): ICD-10-CM

## 2024-10-24 PROCEDURE — 1159F MED LIST DOCD IN RCRD: CPT | Performed by: INTERNAL MEDICINE

## 2024-10-24 PROCEDURE — 3078F DIAST BP <80 MM HG: CPT | Performed by: INTERNAL MEDICINE

## 2024-10-24 PROCEDURE — 1123F ACP DISCUSS/DSCN MKR DOCD: CPT | Performed by: INTERNAL MEDICINE

## 2024-10-24 PROCEDURE — 99214 OFFICE O/P EST MOD 30 MIN: CPT | Performed by: INTERNAL MEDICINE

## 2024-10-24 PROCEDURE — 3074F SYST BP LT 130 MM HG: CPT | Performed by: INTERNAL MEDICINE

## 2024-10-24 RX ORDER — AMLODIPINE BESYLATE 5 MG/1
5 TABLET ORAL DAILY
Qty: 90 TABLET | Refills: 1 | Status: SHIPPED | OUTPATIENT
Start: 2024-10-24

## 2024-10-24 RX ORDER — ATORVASTATIN CALCIUM 40 MG/1
TABLET, FILM COATED ORAL
Qty: 90 TABLET | Refills: 1 | Status: SHIPPED | OUTPATIENT
Start: 2024-10-24

## 2024-10-24 RX ORDER — LEVOTHYROXINE SODIUM 75 UG/1
75 TABLET ORAL DAILY
Qty: 90 TABLET | Refills: 1 | Status: CANCELLED | OUTPATIENT
Start: 2024-10-24

## 2024-10-24 RX ORDER — VALSARTAN 80 MG/1
80 TABLET ORAL DAILY
Qty: 90 TABLET | Refills: 1 | Status: SHIPPED | OUTPATIENT
Start: 2024-10-24

## 2024-10-24 RX ORDER — SILDENAFIL 100 MG/1
100 TABLET, FILM COATED ORAL PRN
Qty: 4 TABLET | Refills: 3 | Status: SHIPPED | OUTPATIENT
Start: 2024-10-24

## 2024-10-24 ASSESSMENT — PATIENT HEALTH QUESTIONNAIRE - PHQ9
SUM OF ALL RESPONSES TO PHQ QUESTIONS 1-9: 0
SUM OF ALL RESPONSES TO PHQ QUESTIONS 1-9: 0
SUM OF ALL RESPONSES TO PHQ9 QUESTIONS 1 & 2: 0
SUM OF ALL RESPONSES TO PHQ QUESTIONS 1-9: 0
1. LITTLE INTEREST OR PLEASURE IN DOING THINGS: NOT AT ALL
2. FEELING DOWN, DEPRESSED OR HOPELESS: NOT AT ALL
SUM OF ALL RESPONSES TO PHQ QUESTIONS 1-9: 0

## 2024-10-24 NOTE — PROGRESS NOTES
(hyperlipidemia)     Hypothyroidism     Positive colorectal cancer screening using Cologuard test 2020    Dr Finnegan  Colonoscopy : 2021 : adenoma. F/u in 5 years       Past Surgical History:   Procedure Laterality Date    APPENDECTOMY  age 20    CATARACT REMOVAL Left 2022    DR Bocanegra    CATARACT REMOVAL Right 2022    COLONOSCOPY  2012         COLONOSCOPY  2021    repeat in 5 yrs per Dr. Smith    DENTAL SURGERY  2022     Social History     Tobacco Use    Smoking status: Former     Current packs/day: 0.00     Average packs/day: 1 pack/day for 35.0 years (35.0 ttl pk-yrs)     Types: Cigarettes     Start date: 1953     Quit date: 1988     Years since quittin.9    Smokeless tobacco: Never   Substance Use Topics    Alcohol use: No     Alcohol/week: 0.0 standard drinks of alcohol       LAB REVIEW:  CBC:   Lab Results   Component Value Date/Time    WBC 6.2 10/04/2023 06:56 AM    HGB 15.6 2024 12:25 PM    HCT 46.0 10/04/2023 06:56 AM     10/04/2023 06:56 AM     Lipids:   Lab Results   Component Value Date    HDL 52 2024    LDLDIRECT 53 2021    TRIGLYCFAST 143 2024    CHOLFAST 129 2024     Renal:   Lab Results   Component Value Date/Time    BUN 18 2024 12:25 PM    CREATININE 0.8 2024 12:25 PM     2024 12:25 PM    K 4.2 2024 12:25 PM    ALKPHOS 122 2024 07:07 AM    ALT 17 2024 07:07 AM    AST 23 2024 07:07 AM    GLUCOSE 94 2024 12:25 PM    GLUF 102 2022 07:00 AM     PT/INR: No results found for: \"INR\"  A1C: No results found for: \"LABA1C\"        Chris Galeas MD, 10/24/2024 , 10:45 AM

## 2024-12-06 ENCOUNTER — TELEMEDICINE (OUTPATIENT)
Age: 79
End: 2024-12-06
Payer: COMMERCIAL

## 2024-12-06 DIAGNOSIS — Z00.00 MEDICARE ANNUAL WELLNESS VISIT, SUBSEQUENT: Primary | ICD-10-CM

## 2024-12-06 PROCEDURE — 1123F ACP DISCUSS/DSCN MKR DOCD: CPT | Performed by: INTERNAL MEDICINE

## 2024-12-06 PROCEDURE — G0439 PPPS, SUBSEQ VISIT: HCPCS | Performed by: INTERNAL MEDICINE

## 2024-12-06 PROCEDURE — 1159F MED LIST DOCD IN RCRD: CPT | Performed by: INTERNAL MEDICINE

## 2024-12-06 ASSESSMENT — PATIENT HEALTH QUESTIONNAIRE - PHQ9
SUM OF ALL RESPONSES TO PHQ QUESTIONS 1-9: 0
2. FEELING DOWN, DEPRESSED OR HOPELESS: NOT AT ALL
SUM OF ALL RESPONSES TO PHQ9 QUESTIONS 1 & 2: 0
SUM OF ALL RESPONSES TO PHQ QUESTIONS 1-9: 0
1. LITTLE INTEREST OR PLEASURE IN DOING THINGS: NOT AT ALL

## 2024-12-06 NOTE — PATIENT INSTRUCTIONS
Personalized Preventive Plan for Thomas Sapp - 12/6/2024  Medicare offers a range of preventive health benefits. Some of the tests and screenings are paid in full while other may be subject to a deductible, co-insurance, and/or copay.    Some of these benefits include a comprehensive review of your medical history including lifestyle, illnesses that may run in your family, and various assessments and screenings as appropriate.    After reviewing your medical record and screening and assessments performed today your provider may have ordered immunizations, labs, imaging, and/or referrals for you.  A list of these orders (if applicable) as well as your Preventive Care list are included within your After Visit Summary for your review.    Other Preventive Recommendations:    A preventive eye exam performed by an eye specialist is recommended every 1-2 years to screen for glaucoma; cataracts, macular degeneration, and other eye disorders.  A preventive dental visit is recommended every 6 months.  Try to get at least 150 minutes of exercise per week or 10,000 steps per day on a pedometer .  Order or download the FREE \"Exercise & Physical Activity: Your Everyday Guide\" from [employer]. Call 1-536.724.5222 or search [employer] online.  You need 2525-9686 mg of calcium and 8028-9925 IU of vitamin D per day. It is possible to meet your calcium requirement with diet alone, but a vitamin D supplement is usually necessary to meet this goal.  When exposed to the sun, use a sunscreen that protects against both UVA and UVB radiation with an SPF of 30 or greater. Reapply every 2 to 3 hours or after sweating, drying off with a towel, or swimming.  Always wear a seat belt when traveling in a car. Always wear a helmet when riding a bicycle or motorcycle.

## 2024-12-06 NOTE — PROGRESS NOTES
Medicare Annual Wellness Visit    Thomas Sapp is here for Medicare AWV    Assessment & Plan   Medicare annual wellness visit, subsequent  Recommendations for Preventive Services Due: see orders and patient instructions/AVS.  Recommended screening schedule for the next 5-10 years is provided to the patient in written form: see Patient Instructions/AVS.     No follow-ups on file.     Subjective       Patient's complete Health Risk Assessment and screening values have been reviewed and are found in Flowsheets. The following problems were reviewed today and where indicated follow up appointments were made and/or referrals ordered.    Positive Risk Factor Screenings with Interventions:                   Vision Screen:  Do you have difficulty driving, watching TV, or doing any of your daily activities because of your eyesight?: No  Have you had an eye exam within the past year?: (!) No (needs)  Interventions:   Patient encouraged to make appointment with their eye specialist    Safety:  Do you have any tripping hazards - loose or unsecured carpets or rugs?: (!) Yes  Interventions:  Patient comments: patient's wife also had her AWV today and states there is one rug that is questionable. Recommended replacement or removal for safety.  Patient declined any further interventions or treatment     Advanced Directives:  Do you have a Living Will?: (!) No (not)    Intervention:  has NO advanced directive - not interested in additional information                     Objective    Patient-Reported Vitals  No data recorded     Unable to obtain 3 vital signs due to patient not having equipment to take blood pressure/temperature.                Allergies   Allergen Reactions    Antihistamines, Chlorpheniramine-Type      Tachycardia      Pneumovax 23 [Pneumococcal Vac Polyvalent]      Body aches, redness at injection site     Prior to Visit Medications    Medication Sig Taking? Authorizing Provider   amLODIPine (NORVASC) 5 MG

## 2025-01-17 ENCOUNTER — HOSPITAL ENCOUNTER (OUTPATIENT)
Age: 80
Discharge: HOME | End: 2025-01-17
Payer: COMMERCIAL

## 2025-01-17 LAB
ALBUMIN SERPL-MCNC: 4 G/DL (ref 3.4–5)
ALBUMIN/GLOB SERPL: 1.4 {RATIO} (ref 1.1–2.2)
ALP SERPL-CCNC: 128 U/L (ref 40–129)
ALT SERPL-CCNC: 16 U/L (ref 10–40)
ANION GAP SERPL CALCULATED.3IONS-SCNC: 10 MMOL/L (ref 4–16)
AST SERPL-CCNC: 20 U/L (ref 15–37)
BILIRUB SERPL-MCNC: 0.5 MG/DL (ref 0–1)
BUN SERPL-MCNC: 14 MG/DL (ref 6–23)
CALCIUM SERPL-MCNC: 9.2 MG/DL (ref 8.3–10.6)
CHLORIDE SERPL-SCNC: 104 MMOL/L (ref 99–110)
CHOLEST SERPL-MCNC: 101 MG/DL (ref 125–199)
CO2 SERPL-SCNC: 27 MMOL/L (ref 21–32)
CREAT SERPL-MCNC: 1 MG/DL (ref 0.9–1.3)
GFR, ESTIMATED: 77 ML/MIN/1.73M2
GLUCOSE SERPL-MCNC: 96 MG/DL (ref 74–99)
HDLC SERPL-MCNC: 52 MG/DL
LDLC SERPL CALC-MCNC: 31 MG/DL
POTASSIUM SERPL-SCNC: 4.5 MMOL/L (ref 3.5–5.1)
PROT SERPL-MCNC: 6.8 G/DL (ref 6.4–8.2)
SODIUM SERPL-SCNC: 141 MMOL/L (ref 135–145)
TRIGL SERPL-MCNC: 91 MG/DL
TSH SERPL DL<=0.05 MIU/L-ACNC: 1.46 UIU/ML (ref 0.27–4.2)

## 2025-01-17 PROCEDURE — 84443 ASSAY THYROID STIM HORMONE: CPT

## 2025-01-17 PROCEDURE — 80061 LIPID PANEL: CPT

## 2025-01-17 PROCEDURE — 80053 COMPREHEN METABOLIC PANEL: CPT

## 2025-01-17 PROCEDURE — 36415 COLL VENOUS BLD VENIPUNCTURE: CPT

## 2025-01-24 ENCOUNTER — OFFICE VISIT (OUTPATIENT)
Age: 80
End: 2025-01-24
Payer: COMMERCIAL

## 2025-01-24 ENCOUNTER — OFFICE VISIT (OUTPATIENT)
Age: 80
End: 2025-01-24

## 2025-01-24 VITALS
BODY MASS INDEX: 24.71 KG/M2 | HEART RATE: 72 BPM | DIASTOLIC BLOOD PRESSURE: 70 MMHG | WEIGHT: 166.8 LBS | TEMPERATURE: 98.2 F | SYSTOLIC BLOOD PRESSURE: 132 MMHG | RESPIRATION RATE: 16 BRPM | HEIGHT: 69 IN

## 2025-01-24 VITALS
WEIGHT: 166.8 LBS | HEIGHT: 69 IN | BODY MASS INDEX: 24.71 KG/M2 | HEART RATE: 72 BPM | DIASTOLIC BLOOD PRESSURE: 70 MMHG | OXYGEN SATURATION: 96 % | TEMPERATURE: 98.2 F | SYSTOLIC BLOOD PRESSURE: 132 MMHG | RESPIRATION RATE: 16 BRPM

## 2025-01-24 DIAGNOSIS — I47.10 SVT (SUPRAVENTRICULAR TACHYCARDIA) (HCC): ICD-10-CM

## 2025-01-24 DIAGNOSIS — E03.9 ACQUIRED HYPOTHYROIDISM: ICD-10-CM

## 2025-01-24 DIAGNOSIS — Z00.00 MEDICARE ANNUAL WELLNESS VISIT, SUBSEQUENT: Primary | ICD-10-CM

## 2025-01-24 DIAGNOSIS — I10 ESSENTIAL HYPERTENSION: Primary | ICD-10-CM

## 2025-01-24 DIAGNOSIS — E78.00 PURE HYPERCHOLESTEROLEMIA: ICD-10-CM

## 2025-01-24 DIAGNOSIS — N52.8 OTHER MALE ERECTILE DYSFUNCTION: ICD-10-CM

## 2025-01-24 DIAGNOSIS — N40.1 BENIGN PROSTATIC HYPERPLASIA WITH URINARY FREQUENCY: ICD-10-CM

## 2025-01-24 DIAGNOSIS — C67.9 BLADDER CARCINOMA (HCC): ICD-10-CM

## 2025-01-24 DIAGNOSIS — R35.0 BENIGN PROSTATIC HYPERPLASIA WITH URINARY FREQUENCY: ICD-10-CM

## 2025-01-24 DIAGNOSIS — R10.30 LOWER ABDOMINAL PAIN: ICD-10-CM

## 2025-01-24 LAB
BILIRUBIN, POC: NEGATIVE
BLOOD URINE, POC: NEGATIVE
CLARITY, POC: CLEAR
COLOR, POC: YELLOW
GLUCOSE URINE, POC: NEGATIVE MG/DL
KETONES, POC: NEGATIVE MG/DL
LEUKOCYTE EST, POC: NEGATIVE
NITRITE, POC: NEGATIVE
PH, POC: 8.5
PROTEIN, POC: NEGATIVE MG/DL
SPECIFIC GRAVITY, POC: 1.02
UROBILINOGEN, POC: 0.2 MG/DL

## 2025-01-24 PROCEDURE — 3078F DIAST BP <80 MM HG: CPT | Performed by: INTERNAL MEDICINE

## 2025-01-24 PROCEDURE — 99214 OFFICE O/P EST MOD 30 MIN: CPT | Performed by: INTERNAL MEDICINE

## 2025-01-24 PROCEDURE — 1123F ACP DISCUSS/DSCN MKR DOCD: CPT | Performed by: INTERNAL MEDICINE

## 2025-01-24 PROCEDURE — 3075F SYST BP GE 130 - 139MM HG: CPT | Performed by: INTERNAL MEDICINE

## 2025-01-24 PROCEDURE — 81002 URINALYSIS NONAUTO W/O SCOPE: CPT | Performed by: INTERNAL MEDICINE

## 2025-01-24 PROCEDURE — 1159F MED LIST DOCD IN RCRD: CPT | Performed by: INTERNAL MEDICINE

## 2025-01-24 SDOH — ECONOMIC STABILITY: FOOD INSECURITY: WITHIN THE PAST 12 MONTHS, THE FOOD YOU BOUGHT JUST DIDN'T LAST AND YOU DIDN'T HAVE MONEY TO GET MORE.: NEVER TRUE

## 2025-01-24 SDOH — ECONOMIC STABILITY: FOOD INSECURITY: WITHIN THE PAST 12 MONTHS, YOU WORRIED THAT YOUR FOOD WOULD RUN OUT BEFORE YOU GOT MONEY TO BUY MORE.: NEVER TRUE

## 2025-01-24 ASSESSMENT — PATIENT HEALTH QUESTIONNAIRE - PHQ9
SUM OF ALL RESPONSES TO PHQ9 QUESTIONS 1 & 2: 0
2. FEELING DOWN, DEPRESSED OR HOPELESS: NOT AT ALL
SUM OF ALL RESPONSES TO PHQ QUESTIONS 1-9: 0
SUM OF ALL RESPONSES TO PHQ QUESTIONS 1-9: 0
1. LITTLE INTEREST OR PLEASURE IN DOING THINGS: NOT AT ALL
SUM OF ALL RESPONSES TO PHQ QUESTIONS 1-9: 0
SUM OF ALL RESPONSES TO PHQ QUESTIONS 1-9: 0

## 2025-01-24 ASSESSMENT — ENCOUNTER SYMPTOMS
ABDOMINAL PAIN: 0
GASTROINTESTINAL NEGATIVE: 1
ABDOMINAL DISTENTION: 0
BLOOD IN STOOL: 0
RESPIRATORY NEGATIVE: 1
EYES NEGATIVE: 1
ALLERGIC/IMMUNOLOGIC NEGATIVE: 1

## 2025-01-24 NOTE — PROGRESS NOTES
Thomas Sapp  Patient's  is 1945  Seen in office on 2025      SUBJECTIVE:  Thomas goetz 79 y.o.year old male presents today   Chief Complaint   Patient presents with    3 Month Follow-Up    Results     Lab review    Abdominal Pain     No pain just pressure, patient requesting UA     Patient is here for follow-up of hypertension, hyperlipidemia, hypothyroidism and bladder cancer.   .      Patient has hypertension. Taking medications No headaches, no chest pain, no palpitations and no dizziness.     Pt has hypothyroidism and is taking medication     Patient has hyperlipidemia. Taking medications. No abdominal pain, no nausea or vomiting. No myalgias.    No dysphagia     Pt sees DR Leyva for cysto and has upcoming appointment on 25  Pt has some discomfort in bladder area. Some times burning. Requesting      He still walks about 3 miles an hour  No chest pain. No pedal edema. No SOB  No CHF     Patient has history of SVT for which he underwent stress test and echocardiogram both were normal.  He was given Cardizem 40 mg to be taken as needed by Dr. Mercado if the heart rate is more than 140 or if he gets palpitations. But he did not have to take it    Lab results reviewed : TSH is normal CMP is normal     Taking medications regularly. No side effects noted.    Review of Systems   Constitutional: Negative.    HENT: Negative.     Eyes: Negative.    Respiratory: Negative.     Cardiovascular: Negative.    Gastrointestinal: Negative.  Negative for abdominal distention, abdominal pain and blood in stool.   Endocrine: Negative.    Genitourinary: Negative.    Musculoskeletal: Negative.    Skin: Negative.    Allergic/Immunologic: Negative.    Neurological: Negative.    Hematological: Negative.    Psychiatric/Behavioral: Negative.         OBJECTIVE: /70   Pulse 72   Temp 98.2 °F (36.8 °C) (Oral)   Resp 16   Ht 1.753 m (5' 9\")   Wt 75.7 kg (166 lb 12.8 oz)   SpO2 96%   BMI 24.63 kg/m²     Wt

## 2025-01-24 NOTE — PROGRESS NOTES
Medicare Annual Wellness Visit    Thomas Sapp is here for Medicare AWV    Assessment & Plan   Medicare annual wellness visit, subsequent       No follow-ups on file.     Subjective       Patient's complete Health Risk Assessment and screening values have been reviewed and are found in Flowsheets. The following problems were reviewed today and where indicated follow up appointments were made and/or referrals ordered.    Positive Risk Factor Screenings with Interventions:                   Vision Screen:  Do you have difficulty driving, watching TV, or doing any of your daily activities because of your eyesight?: No  Have you had an eye exam within the past year?: (!) No    Interventions:   Patient declines any further evaluation or treatment      Advanced Directives:  Do you have a Living Will?: (!) No    Intervention:  has NO advanced directive - information provided                     Objective   Vitals:    01/24/25 1026   BP: 132/70   Pulse: 72   Resp: 16   Temp: 98.2 °F (36.8 °C)   TempSrc: Oral   Weight: 75.7 kg (166 lb 12.8 oz)   Height: 1.753 m (5' 9\")      Body mass index is 24.63 kg/m².                    Allergies   Allergen Reactions    Antihistamines, Chlorpheniramine-Type      Tachycardia      Pneumovax 23 [Pneumococcal Vac Polyvalent]      Body aches, redness at injection site     Prior to Visit Medications    Medication Sig Taking? Authorizing Provider   amLODIPine (NORVASC) 5 MG tablet Take 1 tablet by mouth daily  Chris Galeas MD   atorvastatin (LIPITOR) 40 MG tablet TAKE ONE TABLET BY MOUTH ONCE DAILY  Chris Galeas MD   valsartan (DIOVAN) 80 MG tablet Take 1 tablet by mouth daily  Chris Galeas MD   sildenafil (VIAGRA) 100 MG tablet Take 1 tablet by mouth as needed for Erectile Dysfunction  Chris Galeas MD   levothyroxine (SYNTHROID) 75 MCG tablet Take 1 tablet by mouth Daily  Chris Galeas MD   OMEGA-3 FATTY ACIDS PO Take by mouth daily  Provider, MD Muna   aspirin 81 MG tablet

## 2025-02-04 RX ORDER — LEVOTHYROXINE SODIUM 75 UG/1
75 TABLET ORAL DAILY
Qty: 90 TABLET | Refills: 1 | Status: SHIPPED | OUTPATIENT
Start: 2025-02-04

## 2025-02-04 RX ORDER — VALSARTAN 80 MG/1
80 TABLET ORAL DAILY
Qty: 90 TABLET | Refills: 1 | Status: SHIPPED | OUTPATIENT
Start: 2025-02-04

## 2025-04-24 ENCOUNTER — OFFICE VISIT (OUTPATIENT)
Age: 80
End: 2025-04-24
Payer: COMMERCIAL

## 2025-04-24 VITALS
RESPIRATION RATE: 12 BRPM | SYSTOLIC BLOOD PRESSURE: 138 MMHG | HEART RATE: 68 BPM | WEIGHT: 168.2 LBS | OXYGEN SATURATION: 98 % | TEMPERATURE: 97.7 F | BODY MASS INDEX: 24.84 KG/M2 | DIASTOLIC BLOOD PRESSURE: 78 MMHG

## 2025-04-24 DIAGNOSIS — I10 ESSENTIAL HYPERTENSION: Primary | ICD-10-CM

## 2025-04-24 DIAGNOSIS — C67.9 BLADDER CARCINOMA (HCC): ICD-10-CM

## 2025-04-24 DIAGNOSIS — I47.10 SVT (SUPRAVENTRICULAR TACHYCARDIA): ICD-10-CM

## 2025-04-24 DIAGNOSIS — E78.00 PURE HYPERCHOLESTEROLEMIA: ICD-10-CM

## 2025-04-24 DIAGNOSIS — E03.9 ACQUIRED HYPOTHYROIDISM: ICD-10-CM

## 2025-04-24 DIAGNOSIS — N52.8 OTHER MALE ERECTILE DYSFUNCTION: ICD-10-CM

## 2025-04-24 PROCEDURE — 3078F DIAST BP <80 MM HG: CPT | Performed by: INTERNAL MEDICINE

## 2025-04-24 PROCEDURE — 1159F MED LIST DOCD IN RCRD: CPT | Performed by: INTERNAL MEDICINE

## 2025-04-24 PROCEDURE — 99214 OFFICE O/P EST MOD 30 MIN: CPT | Performed by: INTERNAL MEDICINE

## 2025-04-24 PROCEDURE — 1123F ACP DISCUSS/DSCN MKR DOCD: CPT | Performed by: INTERNAL MEDICINE

## 2025-04-24 PROCEDURE — 3075F SYST BP GE 130 - 139MM HG: CPT | Performed by: INTERNAL MEDICINE

## 2025-04-24 RX ORDER — DILTIAZEM HYDROCHLORIDE 30 MG/1
30 TABLET, FILM COATED ORAL 4 TIMES DAILY PRN
COMMUNITY

## 2025-04-24 RX ORDER — ATORVASTATIN CALCIUM 40 MG/1
TABLET, FILM COATED ORAL
Qty: 90 TABLET | Refills: 1 | Status: SHIPPED | OUTPATIENT
Start: 2025-04-24

## 2025-04-24 RX ORDER — SILDENAFIL 100 MG/1
100 TABLET, FILM COATED ORAL PRN
Qty: 8 TABLET | Refills: 3 | Status: SHIPPED | OUTPATIENT
Start: 2025-04-24

## 2025-04-24 RX ORDER — AMLODIPINE BESYLATE 5 MG/1
5 TABLET ORAL DAILY
Qty: 90 TABLET | Refills: 1 | Status: SHIPPED | OUTPATIENT
Start: 2025-04-24

## 2025-04-24 RX ORDER — VALSARTAN 80 MG/1
80 TABLET ORAL DAILY
Qty: 90 TABLET | Refills: 1 | Status: SHIPPED | OUTPATIENT
Start: 2025-04-24

## 2025-04-24 RX ORDER — SILDENAFIL 100 MG/1
100 TABLET, FILM COATED ORAL PRN
COMMUNITY

## 2025-04-24 NOTE — PROGRESS NOTES
Thomas Sapp  Patient's  is 1945  Seen in office on 2025      SUBJECTIVE:  Thomas goetz 79 y.o.year old male presents today   Chief Complaint   Patient presents with    Hypertension    Medication Refill     History of Present Illness  The patient is a 79-year-old male who presents for evaluation of hypertension, hyperlipidemia, benign prostatic hyperplasia (BPH), hypothyroidism, and a history of bladder cancer.    A history of bladder cancer is noted, with a cystoscopy performed in 2025 yielding normal results. Occasional difficulty with urination is reported, including frequent nighttime urination and a weak stream. No current medication is taken for the enlarged prostate. A follow-up appointment with the urologist is scheduled for 2025.    Levothyroxine has been taken for approximately 33 years, with the current dosage at 75 mcg daily. The last thyroid-stimulating hormone (TSH) level was 1.4, which is within the normal range.    Atorvastatin 40 mg is taken for hyperlipidemia, reported as effective, and supplemented with omega-3. An active lifestyle is maintained, including walking 3.5 miles daily, and a diet rich in fruits and vegetables is followed.    Sildenafil is occasionally used for erectile dysfunction, with current usage at 4 tablets monthly. The patient is interested in increasing the prescription to 8 tablets monthly.    A past medical history of supraventricular tachycardia (SVT) is noted, with no regular medication currently taken for this condition. Diltiazem is prescribed for use as needed, but it has not been required. A follow-up appointment with the cardiologist, Dr. Mercado, is scheduled for 10/2025.    No current issues with dysphagia or acid reflux are reported, and no gastrointestinal medications are taken. Protonix has been discontinued.      Taking medications regularly. No side effects noted.    Review of Systems  Review of system is normal except as in

## 2025-07-03 ENCOUNTER — HOSPITAL ENCOUNTER (OUTPATIENT)
Age: 80
Discharge: HOME | End: 2025-07-03
Payer: COMMERCIAL

## 2025-07-03 LAB — PSA SERPL-MCNC: 2.72 NG/ML (ref 0–4)

## 2025-07-03 PROCEDURE — 36415 COLL VENOUS BLD VENIPUNCTURE: CPT

## 2025-07-03 PROCEDURE — G0103 PSA SCREENING: HCPCS

## 2025-07-17 ENCOUNTER — HOSPITAL ENCOUNTER (OUTPATIENT)
Age: 80
Discharge: HOME | End: 2025-07-17
Payer: COMMERCIAL

## 2025-07-17 DIAGNOSIS — E03.9 ACQUIRED HYPOTHYROIDISM: ICD-10-CM

## 2025-07-17 DIAGNOSIS — I10 ESSENTIAL HYPERTENSION: ICD-10-CM

## 2025-07-17 DIAGNOSIS — E78.00 PURE HYPERCHOLESTEROLEMIA: ICD-10-CM

## 2025-07-17 LAB
ALBUMIN SERPL-MCNC: 4.2 G/DL (ref 3.4–5)
ALBUMIN/GLOB SERPL: 1.5 {RATIO}
ALP SERPL-CCNC: 119 U/L (ref 40–129)
ALT SERPL-CCNC: 19 U/L (ref 10–40)
ANION GAP SERPL CALCULATED.3IONS-SCNC: 11 MMOL/L (ref 9–17)
AST SERPL-CCNC: 24 U/L (ref 15–37)
BASOPHILS # BLD: 0.05 K/UL
BASOPHILS NFR BLD: 1 % (ref 0–1)
BILIRUB SERPL-MCNC: 0.5 MG/DL (ref 0–1)
BUN SERPL-MCNC: 20 MG/DL (ref 7–20)
CALCIUM SERPL-MCNC: 9.8 MG/DL (ref 8.3–10.6)
CHLORIDE SERPL-SCNC: 103 MMOL/L (ref 99–110)
CHOLEST SERPL-MCNC: 106 MG/DL (ref 125–199)
CO2 SERPL-SCNC: 27 MMOL/L (ref 21–32)
CREAT SERPL-MCNC: 1.2 MG/DL (ref 0.8–1.3)
EOSINOPHIL # BLD: 0.36 K/UL
EOSINOPHILS RELATIVE PERCENT: 5 % (ref 0–3)
ERYTHROCYTE [DISTWIDTH] IN BLOOD BY AUTOMATED COUNT: 13.6 % (ref 11.7–14.9)
GFR, ESTIMATED: 63 ML/MIN/1.73M2
GLUCOSE SERPL-MCNC: 101 MG/DL (ref 74–99)
HCT VFR BLD AUTO: 47.2 % (ref 42–52)
HDLC SERPL-MCNC: 49 MG/DL
HGB BLD-MCNC: 15.5 G/DL (ref 13.5–18)
IMM GRANULOCYTES # BLD AUTO: 0.02 K/UL
IMM GRANULOCYTES NFR BLD: 0 %
LDLC SERPL CALC-MCNC: 38 MG/DL
LYMPHOCYTES NFR BLD: 1.97 K/UL
LYMPHOCYTES RELATIVE PERCENT: 28 % (ref 24–44)
MCH RBC QN AUTO: 31.1 PG (ref 27–31)
MCHC RBC AUTO-ENTMCNC: 32.8 G/DL (ref 32–36)
MCV RBC AUTO: 94.8 FL (ref 78–100)
MONOCYTES NFR BLD: 0.71 K/UL
MONOCYTES NFR BLD: 10 % (ref 0–5)
NEUTROPHILS NFR BLD: 55 % (ref 36–66)
NEUTS SEG NFR BLD: 3.87 K/UL
PLATELET # BLD AUTO: 228 K/UL (ref 140–440)
PMV BLD AUTO: 8.9 FL (ref 7.5–11.1)
POTASSIUM SERPL-SCNC: 4.7 MMOL/L (ref 3.5–5.1)
PROT SERPL-MCNC: 7.1 G/DL (ref 6.4–8.2)
RBC # BLD AUTO: 4.98 M/UL (ref 4.6–6.2)
SODIUM SERPL-SCNC: 141 MMOL/L (ref 136–145)
TRIGL SERPL-MCNC: 95 MG/DL
TSH SERPL DL<=0.05 MIU/L-ACNC: 1.47 UIU/ML (ref 0.27–4.2)
WBC OTHER # BLD: 7 K/UL (ref 4–10.5)

## 2025-07-17 PROCEDURE — 36415 COLL VENOUS BLD VENIPUNCTURE: CPT

## 2025-07-17 PROCEDURE — 80053 COMPREHEN METABOLIC PANEL: CPT

## 2025-07-17 PROCEDURE — 85025 COMPLETE CBC W/AUTO DIFF WBC: CPT

## 2025-07-17 PROCEDURE — 84443 ASSAY THYROID STIM HORMONE: CPT

## 2025-07-17 PROCEDURE — 80061 LIPID PANEL: CPT

## 2025-07-24 ENCOUNTER — OFFICE VISIT (OUTPATIENT)
Age: 80
End: 2025-07-24
Payer: COMMERCIAL

## 2025-07-24 VITALS
WEIGHT: 165.4 LBS | DIASTOLIC BLOOD PRESSURE: 70 MMHG | RESPIRATION RATE: 16 BRPM | SYSTOLIC BLOOD PRESSURE: 142 MMHG | BODY MASS INDEX: 24.43 KG/M2 | HEART RATE: 68 BPM | OXYGEN SATURATION: 97 % | TEMPERATURE: 97.7 F

## 2025-07-24 DIAGNOSIS — E78.00 PURE HYPERCHOLESTEROLEMIA: Primary | ICD-10-CM

## 2025-07-24 DIAGNOSIS — R10.9 LEFT SIDED ABDOMINAL PAIN: ICD-10-CM

## 2025-07-24 DIAGNOSIS — C67.9 BLADDER CARCINOMA (HCC): ICD-10-CM

## 2025-07-24 DIAGNOSIS — R13.19 ESOPHAGEAL DYSPHAGIA: ICD-10-CM

## 2025-07-24 DIAGNOSIS — N40.1 BENIGN PROSTATIC HYPERPLASIA WITH URINARY FREQUENCY: ICD-10-CM

## 2025-07-24 DIAGNOSIS — E03.9 ACQUIRED HYPOTHYROIDISM: ICD-10-CM

## 2025-07-24 DIAGNOSIS — R35.0 BENIGN PROSTATIC HYPERPLASIA WITH URINARY FREQUENCY: ICD-10-CM

## 2025-07-24 DIAGNOSIS — I10 ESSENTIAL HYPERTENSION: ICD-10-CM

## 2025-07-24 DIAGNOSIS — N52.8 OTHER MALE ERECTILE DYSFUNCTION: ICD-10-CM

## 2025-07-24 PROCEDURE — 1159F MED LIST DOCD IN RCRD: CPT | Performed by: INTERNAL MEDICINE

## 2025-07-24 PROCEDURE — 1123F ACP DISCUSS/DSCN MKR DOCD: CPT | Performed by: INTERNAL MEDICINE

## 2025-07-24 PROCEDURE — 3078F DIAST BP <80 MM HG: CPT | Performed by: INTERNAL MEDICINE

## 2025-07-24 PROCEDURE — 99214 OFFICE O/P EST MOD 30 MIN: CPT | Performed by: INTERNAL MEDICINE

## 2025-07-24 PROCEDURE — 3077F SYST BP >= 140 MM HG: CPT | Performed by: INTERNAL MEDICINE

## 2025-07-24 RX ORDER — SILDENAFIL 100 MG/1
100 TABLET, FILM COATED ORAL PRN
Qty: 4 TABLET | Refills: 3 | Status: SHIPPED | OUTPATIENT
Start: 2025-07-24

## 2025-07-24 RX ORDER — ATORVASTATIN CALCIUM 40 MG/1
TABLET, FILM COATED ORAL
Qty: 90 TABLET | Refills: 1 | Status: SHIPPED | OUTPATIENT
Start: 2025-07-24

## 2025-07-24 RX ORDER — AMLODIPINE BESYLATE 5 MG/1
5 TABLET ORAL DAILY
Qty: 90 TABLET | Refills: 1 | Status: SHIPPED | OUTPATIENT
Start: 2025-07-24

## 2025-07-24 RX ORDER — VALSARTAN 160 MG/1
160 TABLET ORAL DAILY
Qty: 90 TABLET | Refills: 1 | Status: SHIPPED | OUTPATIENT
Start: 2025-07-24

## 2025-07-24 RX ORDER — LEVOTHYROXINE SODIUM 75 UG/1
75 TABLET ORAL DAILY
Qty: 90 TABLET | Refills: 1 | Status: SHIPPED | OUTPATIENT
Start: 2025-07-24

## 2025-07-24 ASSESSMENT — PATIENT HEALTH QUESTIONNAIRE - PHQ9
SUM OF ALL RESPONSES TO PHQ QUESTIONS 1-9: 0
SUM OF ALL RESPONSES TO PHQ QUESTIONS 1-9: 0
1. LITTLE INTEREST OR PLEASURE IN DOING THINGS: NOT AT ALL
SUM OF ALL RESPONSES TO PHQ QUESTIONS 1-9: 0
2. FEELING DOWN, DEPRESSED OR HOPELESS: NOT AT ALL
SUM OF ALL RESPONSES TO PHQ QUESTIONS 1-9: 0

## 2025-07-24 NOTE — PROGRESS NOTES
Thomas Sapp  Patient's  is 1945  Seen in office on 2025      SUBJECTIVE:  Thomas goetz 80 y.o.year old male presents today   Chief Complaint   Patient presents with    Hypertension    Results     Lab review    Medication Refill    Abdominal Pain     Left side pressure, no pain saw Dr Leyva no hernia     .  History of Present Illness  The patient is a 80-year-old male who presents for evaluation of left-sided abdominal discomfort, hyperlipidemia, hypertension, hypothyroidism, erectile dysfunction, and benign prostatic hyperplasia.    He reports a sensation of pressure in his left abdomen, which he describes as discomfort rather than pain. This discomfort has been present for some time and varies in intensity. It is not noticeable when he is lying down but becomes apparent when he sits or applies pressure to his stomach. He recalls a similar episode years ago but does not remember the details or resolution. He consulted Dr. Leyva 2 weeks ago, who ruled out a hernia. He has no history of kidney stones but was previously diagnosed with diverticulitis. He is due for a colonoscopy in 2026. He has a long-standing history of internal hemorrhoids, which were removed by Dr. Malone but have since recurred without causing significant discomfort.    He is currently on atorvastatin 40 mg daily for hyperlipidemia and has an upcoming appointment with his cardiologist in 10/2025.    His blood pressure readings have been slightly elevated recently. He takes Diovan 80 mg daily and amlodipine 5 mg daily, and consumes a lot of homemade fruit juice. He takes amlodipine in the morning and valsartan at night.    He has hypothyroidism and is on levothyroxine 75 mcg daily. He reports no feelings of fatigue or tiredness and maintains an active lifestyle, walking between 3 to 4 miles daily.    He has a history of bladder cancer, which is currently stable. He continues to undergo cystoscopy under the care of

## 2025-08-01 ENCOUNTER — HOSPITAL ENCOUNTER (OUTPATIENT)
Dept: CT IMAGING | Age: 80
Discharge: HOME | End: 2025-08-01
Payer: COMMERCIAL

## 2025-08-01 DIAGNOSIS — R10.9 LEFT SIDED ABDOMINAL PAIN: ICD-10-CM

## 2025-08-01 PROCEDURE — 74176 CT ABD & PELVIS W/O CONTRAST: CPT
